# Patient Record
Sex: FEMALE | Race: WHITE | NOT HISPANIC OR LATINO | Employment: OTHER | URBAN - METROPOLITAN AREA
[De-identification: names, ages, dates, MRNs, and addresses within clinical notes are randomized per-mention and may not be internally consistent; named-entity substitution may affect disease eponyms.]

---

## 2017-08-11 ENCOUNTER — OFFICE VISIT (OUTPATIENT)
Dept: OBSTETRICS AND GYNECOLOGY | Facility: CLINIC | Age: 67
End: 2017-08-11
Payer: MEDICARE

## 2017-08-11 ENCOUNTER — PATIENT MESSAGE (OUTPATIENT)
Dept: OBSTETRICS AND GYNECOLOGY | Facility: CLINIC | Age: 67
End: 2017-08-11

## 2017-08-11 VITALS — WEIGHT: 189.13 LBS | BODY MASS INDEX: 36.94 KG/M2

## 2017-08-11 DIAGNOSIS — I10 ESSENTIAL HYPERTENSION: ICD-10-CM

## 2017-08-11 DIAGNOSIS — N39.41 URGE INCONTINENCE OF URINE: Primary | ICD-10-CM

## 2017-08-11 PROCEDURE — 1159F MED LIST DOCD IN RCRD: CPT | Mod: S$GLB,,, | Performed by: OBSTETRICS & GYNECOLOGY

## 2017-08-11 PROCEDURE — 99213 OFFICE O/P EST LOW 20 MIN: CPT | Mod: S$GLB,,, | Performed by: OBSTETRICS & GYNECOLOGY

## 2017-08-11 PROCEDURE — 1126F AMNT PAIN NOTED NONE PRSNT: CPT | Mod: S$GLB,,, | Performed by: OBSTETRICS & GYNECOLOGY

## 2017-08-11 PROCEDURE — 3008F BODY MASS INDEX DOCD: CPT | Mod: S$GLB,,, | Performed by: OBSTETRICS & GYNECOLOGY

## 2017-08-11 PROCEDURE — 99999 PR PBB SHADOW E&M-EST. PATIENT-LVL II: CPT | Mod: PBBFAC,,, | Performed by: OBSTETRICS & GYNECOLOGY

## 2017-08-11 RX ORDER — OXYBUTYNIN CHLORIDE 15 MG/1
15 TABLET, EXTENDED RELEASE ORAL DAILY
Qty: 90 TABLET | Refills: 3 | Status: SHIPPED | OUTPATIENT
Start: 2017-08-11 | End: 2018-03-09

## 2017-08-11 RX ORDER — DARIFENACIN 15 MG/1
15 TABLET, EXTENDED RELEASE ORAL DAILY
Qty: 30 TABLET | Refills: 2 | Status: SHIPPED | OUTPATIENT
Start: 2017-08-11 | End: 2017-08-21

## 2017-08-11 NOTE — PROGRESS NOTES
Chief Complaint   Patient presents with    Urinary Incontinence     experiencing stress incontinence, also recently experiencing incontinence while waiting in line for the restroom. Tried myrbetriq prescribed at last visit it did not help.       History of Present Illness: Julee Ballesteros is a 67 y.o. female that presents today 8/11/2017 for   Chief Complaint   Patient presents with    Urinary Incontinence     experiencing stress incontinence, also recently experiencing incontinence while waiting in line for the restroom. Tried myrbetriq prescribed at last visit it did not help.     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 She reports that she lost large amounts of urine trying to get to the bladder. She says that she can't stop emptying. She reports that is happening over and over.     Past Medical History:   Diagnosis Date    Hyperlipidemia     Hypertension        Past Surgical History:   Procedure Laterality Date    BREAST BIOPSY      CHOLECYSTECTOMY  2014    COLONOSCOPY  2011    repeat in 10    HYSTERECTOMY  1978    for prolapse    TONSILLECTOMY      UMBILICAL HERNIA REPAIR         Current Outpatient Prescriptions   Medication Sig Dispense Refill    acyclovir (ZOVIRAX) 800 MG Tab       b complex vitamins tablet Take 1 tablet by mouth once daily.      co-enzyme Q-10 30 mg capsule       doxycycline (PERIOSTAT) 20 MG tablet       lisinopril-hydrochlorothiazide (PRINZIDE,ZESTORETIC) 20-25 mg Tab Take 1 tablet by mouth once daily. 90 tablet 3    loratadine (CLARITIN) 10 mg tablet       pantoprazole (PROTONIX) 40 MG tablet Take 1 tablet (40 mg total) by mouth once daily. 90 tablet 3    ropinirole (REQUIP) 0.5 MG tablet Take 1 tablet (0.5 mg total) by mouth every evening. 90 tablet 3    simvastatin (ZOCOR) 40 MG tablet Take 1 tablet (40 mg total) by mouth every evening. 90 tablet 3    VENTOLIN HFA 90 mcg/actuation inhaler Inhale 2 puffs into the lungs every 4 (four) hours as needed.      benzonatate  (TESSALON) 200 MG capsule Take 1 capsule (200 mg total) by mouth 3 (three) times daily as needed. 90 capsule 3    darifenacin (ENABLEX) 15 mg 24 hr tablet Take 1 tablet (15 mg total) by mouth once daily. 30 tablet 2     No current facility-administered medications for this visit.        Review of patient's allergies indicates:   Allergen Reactions    Darvon [propoxyphene] Nausea Only    Feldene [piroxicam] Hives    Tramadol Nausea Only       Family History   Problem Relation Age of Onset    Cancer Paternal Grandfather     Diabetes Maternal Grandmother     Heart disease Maternal Grandmother     Diabetes Mother     Heart disease Mother     Diabetes Brother     Heart disease Brother        Social History   Substance Use Topics    Smoking status: Never Smoker    Smokeless tobacco: Never Used    Alcohol use 0.0 oz/week      Comment: Social        OB History    Para Term  AB Living   3 3 3     3   SAB TAB Ectopic Multiple Live Births                  # Outcome Date GA Lbr Ruy/2nd Weight Sex Delivery Anes PTL Lv   3 Term      Vag-Spont      2 Term      Vag-Spont      1 Term      Vag-Spont             Review of Symptoms:  GENERAL: Denies weight gain or weight loss. Feeling well overall.   SKIN: Denies rash or lesions.   HEAD: Denies head injury or headache.   NODES: Denies enlarged lymph nodes.   CHEST: Denies chest pain or shortness of breath.   CARDIOVASCULAR: Denies palpitations or left sided chest pain.   ABDOMEN: No abdominal pain, constipation, diarrhea, nausea, vomiting or rectal bleeding.   URINARY: No frequency, dysuria, hematuria, or burning on urination.  HEMATOLOGIC: No easy bruisability or excessive bleeding.   MUSCULOSKELETAL: Denies joint pain or swelling.     Wt 85.8 kg (189 lb 2.5 oz)   Physical Exam:  APPEARANCE: Well nourished, well developed, in no acute distress.  SKIN: Normal skin turgor, no lesions.  NECK: Neck symmetric without masses   RESPIRATORY: Normal respiratory  effort with no retractions or use of accessory muscles  CARDIOVASCULAR: Peripheral vascular system with no swelling no varicosities and palpation of pulses normal  LYMPHATIC: No enlargements of the lymph nodes noted in the neck, axillae, or groin  ABDOMEN: Soft. No tenderness or masses. No hepatosplenomegaly. No hernias.  PELVIC: Normal external female genitalia without lesions. Normal hair distribution. Adequate perineal body, normal urethral meatus. Urethra with no masses.  Bladder nontender. Vagina moist and well rugated without lesions or discharge. Grade 1-2 cystocele Urethra and bladder normal.  EXTREMITIES: No clubbing cyanosis or edema.    ASSESSMENT/PLAN:  Urge incontinence of urine  -     darifenacin (ENABLEX) 15 mg 24 hr tablet; Take 1 tablet (15 mg total) by mouth once daily.  Dispense: 30 tablet; Refill: 2    Essential hypertension      15 minutes spent today with this patient. Greater than half spent in counseling today.

## 2017-12-12 ENCOUNTER — OFFICE VISIT (OUTPATIENT)
Dept: OBSTETRICS AND GYNECOLOGY | Facility: CLINIC | Age: 67
End: 2017-12-12
Payer: MEDICARE

## 2017-12-12 VITALS
HEIGHT: 60 IN | DIASTOLIC BLOOD PRESSURE: 80 MMHG | SYSTOLIC BLOOD PRESSURE: 146 MMHG | BODY MASS INDEX: 35.96 KG/M2 | WEIGHT: 183.19 LBS

## 2017-12-12 DIAGNOSIS — Z12.31 VISIT FOR SCREENING MAMMOGRAM: ICD-10-CM

## 2017-12-12 DIAGNOSIS — R10.9 RIGHT LATERAL ABDOMINAL PAIN: ICD-10-CM

## 2017-12-12 DIAGNOSIS — Z01.411 ENCOUNTER FOR GYNECOLOGICAL EXAMINATION WITH ABNORMAL FINDING: Primary | ICD-10-CM

## 2017-12-12 DIAGNOSIS — N39.41 URGE INCONTINENCE OF URINE: ICD-10-CM

## 2017-12-12 PROCEDURE — 99999 PR PBB SHADOW E&M-EST. PATIENT-LVL III: CPT | Mod: PBBFAC,,, | Performed by: OBSTETRICS & GYNECOLOGY

## 2017-12-12 PROCEDURE — G0101 CA SCREEN;PELVIC/BREAST EXAM: HCPCS | Mod: S$GLB,,, | Performed by: OBSTETRICS & GYNECOLOGY

## 2017-12-12 NOTE — PROGRESS NOTES
Chief Complaint   Patient presents with    Well Woman     History of Present Illness: Julee Ballesteros is a 67 y.o. female that presents today 12/12/2017 for well gyn visit. She reports that her right ovary hurts and wakes her up at night. She says that it is intermittent. She says when she pushes there it hurts. She says that she has had a cyst there before.    Past Medical History:   Diagnosis Date    Hyperlipidemia     Hypertension        Past Surgical History:   Procedure Laterality Date    BREAST BIOPSY      CHOLECYSTECTOMY  2014    COLONOSCOPY  2011    repeat in 10    HYSTERECTOMY  1978    for prolapse    TONSILLECTOMY      UMBILICAL HERNIA REPAIR         Current Outpatient Prescriptions   Medication Sig Dispense Refill    acyclovir (ZOVIRAX) 800 MG Tab       b complex vitamins tablet Take 1 tablet by mouth once daily.      co-enzyme Q-10 30 mg capsule       doxycycline (PERIOSTAT) 20 MG tablet       gabapentin (NEURONTIN) 100 MG capsule 1 capsule qam and 2 capsules at hs 270 capsule 1    lisinopril-hydrochlorothiazide (PRINZIDE,ZESTORETIC) 20-25 mg Tab Take 1 tablet by mouth once daily. 90 tablet 1    loratadine (CLARITIN) 10 mg tablet       pantoprazole (PROTONIX) 40 MG tablet TAKE 1 TABLET ONE TIME DAILY 90 tablet 3    simvastatin (ZOCOR) 40 MG tablet TAKE 1 TABLET EVERY EVENING 90 tablet 3    oxybutynin (DITROPAN XL) 15 MG TR24 Take 1 tablet (15 mg total) by mouth once daily. 90 tablet 3    VENTOLIN HFA 90 mcg/actuation inhaler Inhale 2 puffs into the lungs every 4 (four) hours as needed.       No current facility-administered medications for this visit.        Review of patient's allergies indicates:   Allergen Reactions    Darvon [propoxyphene] Nausea Only    Feldene [piroxicam] Hives    Tramadol Nausea Only       Family History   Problem Relation Age of Onset    Cancer Paternal Grandfather     Diabetes Maternal Grandmother     Heart disease Maternal Grandmother     Diabetes  Mother     Heart disease Mother     Diabetes Brother     Heart disease Brother     Breast cancer Cousin     Ovarian cancer Neg Hx        Social History     Social History    Marital status:      Spouse name: N/A    Number of children: N/A    Years of education: N/A     Social History Main Topics    Smoking status: Never Smoker    Smokeless tobacco: Never Used    Alcohol use 0.0 oz/week      Comment: Social     Drug use: No    Sexual activity: Yes     Partners: Male     Birth control/ protection: See Surgical Hx     Other Topics Concern    None     Social History Narrative    None       OB History    Para Term  AB Living   3 3 3     3   SAB TAB Ectopic Multiple Live Births                  # Outcome Date GA Lbr Ruy/2nd Weight Sex Delivery Anes PTL Lv   3 Term      Vag-Spont      2 Term      Vag-Spont      1 Term      Vag-Spont             Review of Symptoms:  GENERAL: Denies weight gain or weight loss. Feeling well overall.   SKIN: Denies rash or lesions.   HEAD: Denies head injury or headache.   NODES: Denies enlarged lymph nodes.   CHEST: Denies chest pain or shortness of breath.   CARDIOVASCULAR: Denies palpitations or left sided chest pain.   ABDOMEN: + abdominal pain for years no constipation, diarrhea, nausea, vomiting or rectal bleeding.   URINARY: No frequency, dysuria, hematuria, or burning on urination.  HEMATOLOGIC: No easy bruisability or excessive bleeding.   MUSCULOSKELETAL: Denies joint pain or swelling.     BP (!) 146/80   Ht 5' (1.524 m)   Wt 83.1 kg (183 lb 3.2 oz)   Physical Exam:  APPEARANCE: Well nourished, well developed, in no acute distress.  SKIN: Normal skin turgor, no lesions.  NECK: Neck symmetric without masses   RESPIRATORY: Normal respiratory effort with no retractions or use of accessory muscles  CARDIOVASCULAR: Peripheral vascular system with no swelling no varicosities and palpation of pulses normal  LYMPHATIC: No enlargements of the lymph nodes  noted in the neck, axillae, or groin  ABDOMEN: Soft.  Right superficial abdominal wall tenderness no masses. No hepatosplenomegaly. No hernias.  BREASTS: Symmetrical, no skin changes or visible lesions. No palpable masses, nipple discharge or adenopathy bilaterally.  PELVIC: Normal external female genitalia without lesions. Normal hair distribution. Adequate perineal body, normal urethral meatus. Urethra with no masses.  Bladder nontender. Vagina moist and well rugated without lesions or discharge. No significant cystocele or rectocele.  Adnexa without masses or tenderness. Urethra and bladder normal.   EXTREMITIES: No clubbing cyanosis or edema.    ASSESSMENT/PLAN:  Encounter for gynecological examination with abnormal finding    Visit for screening mammogram  -     Mammo Digital Screening Bilat With CAD; Future; Expected date: 12/12/2017    Urge incontinence of urine    Right lateral abdominal pain  -     US Pelvis Comp with Transvag NON-OB (xpd; Future; Expected date: 12/12/2017          Patient was counseled today on Pap guidelines, recommendation for yearly exams, mammograms every other year after the age of 40 and annually after the age of 50, Colonoscopy after the age of 50, Dexa Bone Scan and calcium and vitamin D supplementation in menopause and to see her PCP for other health maintenance.   FOLLOW-UP:prn

## 2017-12-13 ENCOUNTER — TELEPHONE (OUTPATIENT)
Dept: OBSTETRICS AND GYNECOLOGY | Facility: CLINIC | Age: 67
End: 2017-12-13

## 2017-12-13 NOTE — TELEPHONE ENCOUNTER
----- Message from Marely Lee sent at 12/13/2017  9:42 AM CST -----  Contact: Julee Kunz is asking for MAMMO and Ultrasound scheduled today to be moved to Monday or Wednesday around 9:30. Please call [phone]. Thanks!

## 2017-12-18 ENCOUNTER — HOSPITAL ENCOUNTER (OUTPATIENT)
Dept: RADIOLOGY | Facility: HOSPITAL | Age: 67
Discharge: HOME | End: 2017-12-18
Attending: OBSTETRICS & GYNECOLOGY
Payer: MEDICARE

## 2017-12-18 DIAGNOSIS — R10.9 RIGHT LATERAL ABDOMINAL PAIN: ICD-10-CM

## 2017-12-18 DIAGNOSIS — Z12.31 VISIT FOR SCREENING MAMMOGRAM: ICD-10-CM

## 2017-12-18 PROCEDURE — 76856 US EXAM PELVIC COMPLETE: CPT | Mod: 26,,, | Performed by: RADIOLOGY

## 2017-12-18 PROCEDURE — 77063 BREAST TOMOSYNTHESIS BI: CPT | Mod: 26,,, | Performed by: RADIOLOGY

## 2017-12-18 PROCEDURE — 76856 US EXAM PELVIC COMPLETE: CPT | Mod: TC,PN

## 2017-12-18 PROCEDURE — 76830 TRANSVAGINAL US NON-OB: CPT | Mod: 26,,, | Performed by: RADIOLOGY

## 2017-12-18 PROCEDURE — 77067 SCR MAMMO BI INCL CAD: CPT | Mod: 26,,, | Performed by: RADIOLOGY

## 2017-12-18 PROCEDURE — 77067 SCR MAMMO BI INCL CAD: CPT | Mod: TC,PN

## 2018-04-18 PROBLEM — M46.1 SACROILIITIS: Status: ACTIVE | Noted: 2018-04-18

## 2018-04-18 PROBLEM — M70.61 GREATER TROCHANTERIC BURSITIS, RIGHT: Status: ACTIVE | Noted: 2018-04-18

## 2018-10-15 ENCOUNTER — PATIENT MESSAGE (OUTPATIENT)
Dept: OBSTETRICS AND GYNECOLOGY | Facility: CLINIC | Age: 68
End: 2018-10-15

## 2018-10-15 DIAGNOSIS — Z12.39 SCREENING FOR BREAST CANCER: Primary | ICD-10-CM

## 2019-01-07 ENCOUNTER — HOSPITAL ENCOUNTER (OUTPATIENT)
Dept: RADIOLOGY | Facility: HOSPITAL | Age: 69
Discharge: HOME | End: 2019-01-07
Attending: OBSTETRICS & GYNECOLOGY
Payer: MEDICARE

## 2019-01-07 VITALS — WEIGHT: 191 LBS | HEIGHT: 60 IN | BODY MASS INDEX: 37.5 KG/M2

## 2019-01-07 DIAGNOSIS — Z12.39 SCREENING FOR BREAST CANCER: ICD-10-CM

## 2019-01-07 PROCEDURE — 77067 SCR MAMMO BI INCL CAD: CPT | Mod: 26,,, | Performed by: RADIOLOGY

## 2019-01-07 PROCEDURE — 77063 BREAST TOMOSYNTHESIS BI: CPT | Mod: 26,,, | Performed by: RADIOLOGY

## 2019-01-07 PROCEDURE — 77063 MAMMO DIGITAL SCREENING BILAT WITH TOMOSYNTHESIS_CAD: ICD-10-PCS | Mod: 26,,, | Performed by: RADIOLOGY

## 2019-01-07 PROCEDURE — 77067 MAMMO DIGITAL SCREENING BILAT WITH TOMOSYNTHESIS_CAD: ICD-10-PCS | Mod: 26,,, | Performed by: RADIOLOGY

## 2019-01-07 PROCEDURE — 77067 SCR MAMMO BI INCL CAD: CPT | Mod: TC,PN

## 2019-01-08 ENCOUNTER — TELEPHONE (OUTPATIENT)
Dept: RADIOLOGY | Facility: HOSPITAL | Age: 69
End: 2019-01-08

## 2019-01-08 NOTE — TELEPHONE ENCOUNTER
----- Message from Shankar Lizama sent at 1/8/2019  2:50 PM CST -----  Type:  Patient Returning Call    Who Called: Patient  Who Left Message for Patient:  NA  Does the patient know what this is regarding?:  GREG  Best Call Back Number: [phone], (cell) [phone]    Additional Information:

## 2019-01-09 ENCOUNTER — PATIENT MESSAGE (OUTPATIENT)
Dept: OBSTETRICS AND GYNECOLOGY | Facility: CLINIC | Age: 69
End: 2019-01-09

## 2019-01-10 ENCOUNTER — PATIENT MESSAGE (OUTPATIENT)
Dept: OBSTETRICS AND GYNECOLOGY | Facility: CLINIC | Age: 69
End: 2019-01-10

## 2019-01-10 NOTE — TELEPHONE ENCOUNTER
Pt would like to be fit in for dx mammogram before she goes out of town on 1/27, next available is 1/30 please advise

## 2019-01-16 ENCOUNTER — HOSPITAL ENCOUNTER (OUTPATIENT)
Dept: RADIOLOGY | Facility: HOSPITAL | Age: 69
Discharge: HOME | End: 2019-01-16
Attending: OBSTETRICS & GYNECOLOGY
Payer: MEDICARE

## 2019-01-16 DIAGNOSIS — R92.8 ABNORMAL MAMMOGRAM OF LEFT BREAST: ICD-10-CM

## 2019-01-16 PROCEDURE — 77061 BREAST TOMOSYNTHESIS UNI: CPT | Mod: 26,LT,, | Performed by: RADIOLOGY

## 2019-01-16 PROCEDURE — 76642 US BREAST LEFT LIMITED: ICD-10-PCS | Mod: 26,LT,, | Performed by: RADIOLOGY

## 2019-01-16 PROCEDURE — 76642 ULTRASOUND BREAST LIMITED: CPT | Mod: TC,PO,LT

## 2019-01-16 PROCEDURE — 77061 MAMMO DIGITAL DIAGNOSTIC LEFT WITH TOMOSYNTHESIS_CAD: ICD-10-PCS | Mod: 26,LT,, | Performed by: RADIOLOGY

## 2019-01-16 PROCEDURE — 77065 DX MAMMO INCL CAD UNI: CPT | Mod: 26,LT,, | Performed by: RADIOLOGY

## 2019-01-16 PROCEDURE — 76642 ULTRASOUND BREAST LIMITED: CPT | Mod: 26,LT,, | Performed by: RADIOLOGY

## 2019-01-16 PROCEDURE — 77061 BREAST TOMOSYNTHESIS UNI: CPT | Mod: TC,PO,LT

## 2019-01-16 PROCEDURE — 77065 MAMMO DIGITAL DIAGNOSTIC LEFT WITH TOMOSYNTHESIS_CAD: ICD-10-PCS | Mod: 26,LT,, | Performed by: RADIOLOGY

## 2019-01-16 PROCEDURE — 77065 DX MAMMO INCL CAD UNI: CPT | Mod: TC,PO,LT

## 2019-01-25 ENCOUNTER — PATIENT MESSAGE (OUTPATIENT)
Dept: OBSTETRICS AND GYNECOLOGY | Facility: CLINIC | Age: 69
End: 2019-01-25

## 2019-02-03 ENCOUNTER — PATIENT MESSAGE (OUTPATIENT)
Dept: OBSTETRICS AND GYNECOLOGY | Facility: CLINIC | Age: 69
End: 2019-02-03

## 2019-02-11 ENCOUNTER — PATIENT MESSAGE (OUTPATIENT)
Dept: OBSTETRICS AND GYNECOLOGY | Facility: CLINIC | Age: 69
End: 2019-02-11

## 2019-03-28 ENCOUNTER — OFFICE VISIT (OUTPATIENT)
Dept: OBSTETRICS AND GYNECOLOGY | Facility: CLINIC | Age: 69
End: 2019-03-28
Payer: MEDICARE

## 2019-03-28 VITALS
WEIGHT: 190.06 LBS | SYSTOLIC BLOOD PRESSURE: 100 MMHG | HEIGHT: 60 IN | BODY MASS INDEX: 37.31 KG/M2 | DIASTOLIC BLOOD PRESSURE: 60 MMHG

## 2019-03-28 DIAGNOSIS — N64.4 BREAST PAIN, RIGHT: Primary | ICD-10-CM

## 2019-03-28 PROCEDURE — 3074F PR MOST RECENT SYSTOLIC BLOOD PRESSURE < 130 MM HG: ICD-10-PCS | Mod: CPTII,S$GLB,, | Performed by: OBSTETRICS & GYNECOLOGY

## 2019-03-28 PROCEDURE — 1101F PR PT FALLS ASSESS DOC 0-1 FALLS W/OUT INJ PAST YR: ICD-10-PCS | Mod: CPTII,S$GLB,, | Performed by: OBSTETRICS & GYNECOLOGY

## 2019-03-28 PROCEDURE — 1101F PT FALLS ASSESS-DOCD LE1/YR: CPT | Mod: CPTII,S$GLB,, | Performed by: OBSTETRICS & GYNECOLOGY

## 2019-03-28 PROCEDURE — 99213 OFFICE O/P EST LOW 20 MIN: CPT | Mod: S$GLB,,, | Performed by: OBSTETRICS & GYNECOLOGY

## 2019-03-28 PROCEDURE — 99213 PR OFFICE/OUTPT VISIT, EST, LEVL III, 20-29 MIN: ICD-10-PCS | Mod: S$GLB,,, | Performed by: OBSTETRICS & GYNECOLOGY

## 2019-03-28 PROCEDURE — 99999 PR PBB SHADOW E&M-EST. PATIENT-LVL III: ICD-10-PCS | Mod: PBBFAC,,, | Performed by: OBSTETRICS & GYNECOLOGY

## 2019-03-28 PROCEDURE — 3078F DIAST BP <80 MM HG: CPT | Mod: CPTII,S$GLB,, | Performed by: OBSTETRICS & GYNECOLOGY

## 2019-03-28 PROCEDURE — 99999 PR PBB SHADOW E&M-EST. PATIENT-LVL III: CPT | Mod: PBBFAC,,, | Performed by: OBSTETRICS & GYNECOLOGY

## 2019-03-28 PROCEDURE — 3078F PR MOST RECENT DIASTOLIC BLOOD PRESSURE < 80 MM HG: ICD-10-PCS | Mod: CPTII,S$GLB,, | Performed by: OBSTETRICS & GYNECOLOGY

## 2019-03-28 PROCEDURE — 3074F SYST BP LT 130 MM HG: CPT | Mod: CPTII,S$GLB,, | Performed by: OBSTETRICS & GYNECOLOGY

## 2019-03-28 RX ORDER — SULFAMETHOXAZOLE AND TRIMETHOPRIM 800; 160 MG/1; MG/1
1 TABLET ORAL 2 TIMES DAILY
Qty: 20 TABLET | Refills: 0 | Status: SHIPPED | OUTPATIENT
Start: 2019-03-28 | End: 2019-04-07

## 2019-03-28 NOTE — PROGRESS NOTES
Chief Complaint   Patient presents with    right breast soreness and burning sensation       History of Present Illness: Julee Ballesteros is a 68 y.o. female that presents today 3/28/2019 for   Chief Complaint   Patient presents with    right breast soreness and burning sensation     She reports that for several months she has had right breast pain in the evening after bra removed. She reports that she starts having more pain after removed she feels more swelling fire like pain and throbbing like pain. She has trouble sleeping due to pain.     Past Medical History:   Diagnosis Date    GERD (gastroesophageal reflux disease)     H/O bronchitis     H/O: pneumonia     Hearing aid worn     Hyperlipidemia     Hypertension        Past Surgical History:   Procedure Laterality Date    BLOCK-JOINT-SACROILIAC Bilateral 4/18/2018    [facility]    BREAST BIOPSY Right     neg    BREAST CYST EXCISION Left 1990/ 2005    x2- neg    CHOLECYSTECTOMY  2014    COLONOSCOPY  2011    repeat in 10    FINGER SURGERY      HEEL SPUR EXCISION      HYSTERECTOMY  1978    for prolapse    INJECTION-JOINT/ Greater Trochanteric/ Hip Right 4/18/2018    [facility]    TONSILLECTOMY      UMBILICAL HERNIA REPAIR         Current Outpatient Medications   Medication Sig Dispense Refill    acyclovir (ZOVIRAX) 800 MG Tab Take 800 mg by mouth once daily.       b complex vitamins tablet Take 1 tablet by mouth once daily.      cetirizine (ZYRTEC) 10 MG tablet Take 10 mg by mouth once daily.      cholecalciferol, vitamin D3, (VITAMIN D3) 5,000 unit Tab Take 5,000 Units by mouth once daily.      co-enzyme Q-10 30 mg capsule       doxycycline (PERIOSTAT) 20 MG tablet Take 20 mg by mouth once daily.       gabapentin (NEURONTIN) 100 MG capsule TAKE 1 CAPSULE EVERY MORNING  AND TAKE 2 CAPSULES AT BEDTIME 270 capsule 3    lisinopril-hydrochlorothiazide (PRINZIDE,ZESTORETIC)  20-25 mg Tab Take 1 tablet by mouth once daily. 90 tablet 3    pantoprazole (PROTONIX) 40 MG tablet Take 1 tablet (40 mg total) by mouth once daily. 90 tablet 3    simvastatin (ZOCOR) 40 MG tablet Take 1 tablet (40 mg total) by mouth every evening. 90 tablet 3    sulfamethoxazole-trimethoprim 800-160mg (BACTRIM DS) 800-160 mg Tab Take 1 tablet by mouth 2 (two) times daily. for 10 days 20 tablet 0     No current facility-administered medications for this visit.        Review of patient's allergies indicates:   Allergen Reactions    Darvon [propoxyphene] Nausea Only    Feldene [piroxicam] Hives    Tramadol Nausea Only       Family History   Problem Relation Age of Onset    Cancer Paternal Grandfather     Diabetes Maternal Grandmother     Heart disease Maternal Grandmother     Diabetes Mother     Heart disease Mother     Diabetes Brother     Heart disease Brother     Breast cancer Cousin 60    Breast cancer Maternal Cousin 50    Breast cancer Paternal Cousin 50    Breast cancer Paternal Cousin 50    Ovarian cancer Neg Hx        Social History     Tobacco Use    Smoking status: Never Smoker    Smokeless tobacco: Never Used   Substance Use Topics    Alcohol use: Yes     Alcohol/week: 0.0 oz     Comment: Social     Drug use: No       OB History    Para Term  AB Living   3 3 3     3   SAB TAB Ectopic Multiple Live Births                  # Outcome Date GA Lbr Ruy/2nd Weight Sex Delivery Anes PTL Lv   3 Term      Vag-Spont      2 Term      Vag-Spont      1 Term      Vag-Spont          Review of Symptoms:  GENERAL: Denies weight gain or weight loss. Feeling well overall.   SKIN: Denies rash or lesions.   HEAD: Denies head injury or headache.   NODES: Denies enlarged lymph nodes.   CHEST: Denies chest pain or shortness of breath.   CARDIOVASCULAR: Denies palpitations or left sided chest pain.   ABDOMEN: No abdominal pain, constipation, diarrhea, nausea, vomiting or rectal bleeding.    URINARY: No frequency, dysuria, hematuria, or burning on urination.  HEMATOLOGIC: No easy bruisability or excessive bleeding.   MUSCULOSKELETAL: Denies joint pain or swelling.     /60   Ht 5' (1.524 m)   Wt 86.2 kg (190 lb 0.6 oz)   Physical Exam:  APPEARANCE: Well nourished, well developed, in no acute distress.  SKIN: Normal skin turgor, no lesions.  NECK: Neck symmetric without masses   RESPIRATORY: Normal respiratory effort with no retractions or use of accessory muscles  CARDIOVASCULAR: Peripheral vascular system with no swelling no varicosities and palpation of pulses normal  LYMPHATIC: No enlargements of the lymph nodes noted in the neck, axillae, or groin  ABDOMEN: Soft. No tenderness or masses. No hepatosplenomegaly. No hernias.  BREASTS: Symmetrical, no skin changes or visible lesions. No palpable masses, nipple discharge or adenopathy bilaterally.EXTREMITIES: No clubbing cyanosis or edema.    ASSESSMENT/PLAN:  Breast pain, right  -     sulfamethoxazole-trimethoprim 800-160mg (BACTRIM DS) 800-160 mg Tab; Take 1 tablet by mouth 2 (two) times daily. for 10 days  Dispense: 20 tablet; Refill: 0  -     Mammo Digital Diagnostic Right With CAD; Future; Expected date: 03/28/2019      15 minutes spent today with this patient. Greater than half spent in counseling today.

## 2019-04-05 ENCOUNTER — PATIENT MESSAGE (OUTPATIENT)
Dept: OBSTETRICS AND GYNECOLOGY | Facility: CLINIC | Age: 69
End: 2019-04-05

## 2019-04-05 RX ORDER — CEPHALEXIN 500 MG/1
500 CAPSULE ORAL 4 TIMES DAILY
Qty: 40 CAPSULE | Refills: 0 | Status: SHIPPED | OUTPATIENT
Start: 2019-04-05 | End: 2019-04-15

## 2019-04-08 ENCOUNTER — HOSPITAL ENCOUNTER (OUTPATIENT)
Dept: RADIOLOGY | Facility: HOSPITAL | Age: 69
Discharge: HOME | End: 2019-04-08
Attending: OBSTETRICS & GYNECOLOGY
Payer: MEDICARE

## 2019-04-08 DIAGNOSIS — N64.4 BREAST PAIN, RIGHT: ICD-10-CM

## 2019-04-08 PROCEDURE — 76642 ULTRASOUND BREAST LIMITED: CPT | Mod: 26,RT,, | Performed by: RADIOLOGY

## 2019-04-08 PROCEDURE — 76642 ULTRASOUND BREAST LIMITED: CPT | Mod: TC,PO,RT

## 2019-04-08 PROCEDURE — 76642 US BREAST RIGHT LIMITED: ICD-10-PCS | Mod: 26,RT,, | Performed by: RADIOLOGY

## 2019-04-18 ENCOUNTER — PATIENT MESSAGE (OUTPATIENT)
Dept: OBSTETRICS AND GYNECOLOGY | Facility: CLINIC | Age: 69
End: 2019-04-18

## 2019-10-29 ENCOUNTER — OFFICE VISIT (OUTPATIENT)
Dept: GASTROENTEROLOGY | Facility: CLINIC | Age: 69
End: 2019-10-29
Payer: MEDICARE

## 2019-10-29 VITALS
DIASTOLIC BLOOD PRESSURE: 62 MMHG | RESPIRATION RATE: 18 BRPM | HEART RATE: 87 BPM | WEIGHT: 192 LBS | SYSTOLIC BLOOD PRESSURE: 139 MMHG | HEIGHT: 60 IN | BODY MASS INDEX: 37.69 KG/M2

## 2019-10-29 DIAGNOSIS — Z90.49 S/P CHOLECYSTECTOMY: ICD-10-CM

## 2019-10-29 DIAGNOSIS — Z86.010 HISTORY OF COLON POLYPS: ICD-10-CM

## 2019-10-29 DIAGNOSIS — R10.9 ABDOMINAL CRAMPING: ICD-10-CM

## 2019-10-29 DIAGNOSIS — K21.9 GASTROESOPHAGEAL REFLUX DISEASE WITHOUT ESOPHAGITIS: ICD-10-CM

## 2019-10-29 DIAGNOSIS — R19.7 DIARRHEA, UNSPECIFIED TYPE: Primary | ICD-10-CM

## 2019-10-29 DIAGNOSIS — R19.8 IRREGULAR BOWEL HABITS: ICD-10-CM

## 2019-10-29 DIAGNOSIS — Z87.19 HISTORY OF CONSTIPATION: ICD-10-CM

## 2019-10-29 PROCEDURE — 99999 PR PBB SHADOW E&M-EST. PATIENT-LVL IV: ICD-10-PCS | Mod: PBBFAC,,, | Performed by: NURSE PRACTITIONER

## 2019-10-29 PROCEDURE — 1101F PT FALLS ASSESS-DOCD LE1/YR: CPT | Mod: CPTII,S$GLB,, | Performed by: NURSE PRACTITIONER

## 2019-10-29 PROCEDURE — 3075F PR MOST RECENT SYSTOLIC BLOOD PRESS GE 130-139MM HG: ICD-10-PCS | Mod: CPTII,S$GLB,, | Performed by: NURSE PRACTITIONER

## 2019-10-29 PROCEDURE — 99204 PR OFFICE/OUTPT VISIT, NEW, LEVL IV, 45-59 MIN: ICD-10-PCS | Mod: S$GLB,,, | Performed by: NURSE PRACTITIONER

## 2019-10-29 PROCEDURE — 1101F PR PT FALLS ASSESS DOC 0-1 FALLS W/OUT INJ PAST YR: ICD-10-PCS | Mod: CPTII,S$GLB,, | Performed by: NURSE PRACTITIONER

## 2019-10-29 PROCEDURE — 3078F PR MOST RECENT DIASTOLIC BLOOD PRESSURE < 80 MM HG: ICD-10-PCS | Mod: CPTII,S$GLB,, | Performed by: NURSE PRACTITIONER

## 2019-10-29 PROCEDURE — 3075F SYST BP GE 130 - 139MM HG: CPT | Mod: CPTII,S$GLB,, | Performed by: NURSE PRACTITIONER

## 2019-10-29 PROCEDURE — 99999 PR PBB SHADOW E&M-EST. PATIENT-LVL IV: CPT | Mod: PBBFAC,,, | Performed by: NURSE PRACTITIONER

## 2019-10-29 PROCEDURE — 3078F DIAST BP <80 MM HG: CPT | Mod: CPTII,S$GLB,, | Performed by: NURSE PRACTITIONER

## 2019-10-29 PROCEDURE — 99204 OFFICE O/P NEW MOD 45 MIN: CPT | Mod: S$GLB,,, | Performed by: NURSE PRACTITIONER

## 2019-10-29 RX ORDER — DICYCLOMINE HYDROCHLORIDE 10 MG/1
10 CAPSULE ORAL
Qty: 120 CAPSULE | Refills: 0 | Status: SHIPPED | OUTPATIENT
Start: 2019-10-29 | End: 2019-11-27 | Stop reason: SDUPTHER

## 2019-10-29 NOTE — PROGRESS NOTES
Subjective:       Patient ID: Julee Ballesteros is a 69 y.o. female, Body mass index is 37.5 kg/m².    Chief Complaint: Abdominal Cramping; Diarrhea; and Nausea      Patient is new to me. Former patient of Dr. He.    Diarrhea    This is a recurrent problem. The current episode started more than 1 month ago (Started 7/2019; intermittent). The problem occurs 2 to 4 times per day. The problem has been gradually worsening (worse over this past weekend). The stool consistency is described as watery and mucous (denies blood). The patient states that diarrhea awakens her from sleep. Associated symptoms include abdominal pain (abdominal cramping prior to having diarrhea; denies currently) and bloating. Pertinent negatives include no chills, coughing, fever, increased  flatus, vomiting or weight loss. Exacerbated by: fried food and greasy food. There are no known risk factors (denies recent antibiotics, hospitalization, or foreign travel). She has tried nothing for the symptoms. Her past medical history is significant for irritable bowel syndrome (dx with IBS by Dr. He in 2010). There is no history of inflammatory bowel disease or a recent abdominal surgery.     Review of Systems   Constitutional: Negative for appetite change, chills, fever, unexpected weight change and weight loss.   HENT: Negative for trouble swallowing.    Respiratory: Negative for cough and shortness of breath.    Cardiovascular: Negative for chest pain.   Gastrointestinal: Positive for abdominal pain (abdominal cramping prior to having diarrhea; denies currently), bloating, constipation (hx of constipation; has gone three days without a bowel movement in the past; denies any recent constipation), diarrhea and nausea (nausea prior to diarrhea; denies currently). Negative for blood in stool, flatus and vomiting.   Genitourinary: Negative for difficulty urinating and dysuria.   Musculoskeletal: Negative for gait problem.   Skin: Negative for rash.    Neurological: Negative for speech difficulty.   Psychiatric/Behavioral: Negative for confusion.       Past Medical History:   Diagnosis Date    Colon polyp     GERD (gastroesophageal reflux disease)     H/O bronchitis     H/O: pneumonia     Hearing aid worn     Hyperlipidemia     Hypertension       Past Surgical History:   Procedure Laterality Date    BREAST BIOPSY Right     neg    BREAST CYST EXCISION Left 1990/ 2005    x2- neg    CHOLECYSTECTOMY  2014    COLONOSCOPY  2011    repeat in 10    COLONOSCOPY  08/16/2010    Dr He received report; polyps removed and bx negative    FINGER SURGERY      HEEL SPUR EXCISION      HYSTERECTOMY  1978    for prolapse    TONSILLECTOMY      UMBILICAL HERNIA REPAIR      UPPER GASTROINTESTINAL ENDOSCOPY  2010    Dr. He; reflux per pt report      Family History   Problem Relation Age of Onset    Cancer Paternal Grandfather     Stomach cancer Paternal Grandfather     Diabetes Maternal Grandmother     Heart disease Maternal Grandmother     Diabetes Mother     Heart disease Mother     Diabetes Brother     Heart disease Brother     Breast cancer Cousin 60    Breast cancer Maternal Cousin 50    Breast cancer Paternal Cousin 50    Breast cancer Paternal Cousin 50    Ovarian cancer Neg Hx     Colon polyps Neg Hx     Esophageal cancer Neg Hx     Crohn's disease Neg Hx     Ulcerative colitis Neg Hx       Wt Readings from Last 10 Encounters:   10/29/19 87.1 kg (192 lb 0.3 oz)   08/23/19 86.2 kg (190 lb)   03/28/19 86.2 kg (190 lb 0.6 oz)   03/19/19 86.2 kg (190 lb)   03/04/19 85.6 kg (188 lb 12.8 oz)   01/07/19 86.6 kg (191 lb)   10/03/18 86.6 kg (191 lb)   09/04/18 86 kg (189 lb 11.2 oz)   07/02/18 85.3 kg (188 lb)   05/18/18 85 kg (187 lb 8 oz)       CMP  Sodium   Date Value Ref Range Status   08/23/2019 140 136 - 145 mmol/L Final   10/15/2015 145 137 - 145 MMOL/L Final     Potassium   Date Value Ref Range Status   08/23/2019 3.8 3.5 - 5.1 mmol/L  Final   10/15/2015 3.9 3.5 - 5.1 MMOL/L Final     Chloride   Date Value Ref Range Status   08/23/2019 101 95 - 110 mmol/L Final   10/15/2015 100 98 - 107 MMOL/L Final     CO2   Date Value Ref Range Status   08/23/2019 31 22 - 31 mmol/L Final     Glucose   Date Value Ref Range Status   08/23/2019 112 (H) 70 - 110 mg/dL Final     Comment:     The ADA recommends the following guidelines for fasting glucose:  Normal:       less than 100 mg/dL  Prediabetes:  100 mg/dL to 125 mg/dL  Diabetes:     126 mg/dL or higher       BUN, Bld   Date Value Ref Range Status   08/23/2019 23 (H) 7 - 18 mg/dL Final     Creatinine   Date Value Ref Range Status   08/23/2019 0.78 0.50 - 1.40 mg/dL Final   10/15/2015 0.86 0.52 - 1.04 MG/DL Final     Calcium   Date Value Ref Range Status   08/23/2019 9.7 8.4 - 10.2 mg/dL Final     Total Protein   Date Value Ref Range Status   08/23/2019 6.3 6.0 - 8.4 g/dL Final     Albumin   Date Value Ref Range Status   08/23/2019 4.0 3.5 - 5.2 g/dL Final   10/15/2015 3.8 3.5 - 5.0 G/DL Final     Total Bilirubin   Date Value Ref Range Status   08/23/2019 0.4 0.2 - 1.3 mg/dL Final     Alkaline Phosphatase   Date Value Ref Range Status   08/23/2019 74 38 - 145 U/L Final     AST ([place])   Date Value Ref Range Status   02/04/2016 22 14 - 36 U/L Final     AST   Date Value Ref Range Status   08/23/2019 27 14 - 36 U/L Final     ALT   Date Value Ref Range Status   08/23/2019 37 10 - 44 U/L Final     Anion Gap   Date Value Ref Range Status   08/23/2019 8 8 - 16 mmol/L Final     eGFR if    Date Value Ref Range Status   08/23/2019 >60 >60 mL/min/1.73 m^2 Final     eGFR if non    Date Value Ref Range Status   08/23/2019 >60 >60 mL/min/1.73 m^2 Final     Comment:     Calculation used to obtain the estimated glomerular filtration  rate (eGFR) is the CKD-EPI equation.                 Reviewed prior medical records including endoscopy history (see surgical history).     Objective:       Physical Exam   Constitutional: She is oriented to person, place, and time. She appears well-developed and well-nourished.   HENT:   Head: Normocephalic.   Eyes: Pupils are equal, round, and reactive to light.   Neck: Normal range of motion.   Cardiovascular: Normal rate, regular rhythm and normal heart sounds.   No murmur heard.  Pulmonary/Chest: Breath sounds normal. No respiratory distress. She has no wheezes.   Abdominal: Soft. Bowel sounds are normal. She exhibits no distension and no mass. There is no tenderness. There is no guarding.   Musculoskeletal: Normal range of motion.   Neurological: She is alert and oriented to person, place, and time.   Skin: Skin is warm and dry. No rash noted.   Non jaundiced   Psychiatric: She has a normal mood and affect. Her speech is normal.         Assessment:       1. Diarrhea, unspecified type    2. Irregular bowel habits    3. Abdominal cramping    4. Gastroesophageal reflux disease without esophagitis    5. S/P cholecystectomy    6. History of colon polyps    7. History of constipation           Plan:   All diagnoses and orders for this visit:    Diarrhea, unspecified type & Irregular bowel habits  - Clostridium difficile EIA; Future; Expected date: 10/29/2019  - Stool Exam-Ova,Cysts,Parasites; Future; Expected date: 10/29/2019  - Stool culture; Future; Expected date: 10/29/2019  - Giardia / Cryptosporidum, EIA; Future; Expected date: 10/29/2019  - Occult blood x 1, stool; Future; Expected date: 10/29/2019  - WBC, Stool; Future; Expected date: 10/29/2019  - Rotavirus antigen, stool; Future; Expected date: 10/29/2019  - Adenovirus Molecular Detection, PCR, Non-Blood Stool; Future; Expected date: 10/29/2019  - Start Probiotic once daily  - Recommended increase fiber in diet, especially soluble fiber since this can help bulk up the stool consistency and may help to slow down how fast the stool goes through the colon and can prevent diarrhea  - Schedule Colonoscopy,  discussed procedure with the patient, including risks and benefits, patient verbalized understanding    Abdominal cramping  - Start: dicyclomine (BENTYL) 10 MG capsule; Take 1 capsule (10 mg total) by mouth 4 (four) times daily before meals and nightly.  Dispense: 120 capsule; Refill: 0  - Consider CT of abdomen if symptoms persist/worsen    Gastroesophageal reflux disease without esophagitis   - Continue Protonix 40 mg once daily   - Take PPI in the morning 30-60 minutes before breakfast   - Educated patient on lifestyle modifications to help control/reduce reflux/abdominal pain including: avoid large meals, avoid eating within 2-3 hours of bedtime (avoid late night eating & lying down soon after eating), elevate head of bed if nocturnal symptoms are present, smoking cessation (if current smoker), & weight loss (if overweight).    - Educated to avoid known foods which trigger reflux symptoms & to minimize/avoid high-fat foods, chocolate, caffeine, citrus, alcohol, & tomato products.   - Advised to avoid/limit use of NSAID's, since they can cause GI upset, bleeding, and/or ulcers. If needed, take with food.     S/P cholecystectomy    History of colon polyps    History of constipation    If no improvement in symptoms or symptoms worsen, call/follow-up at clinic or go to ER

## 2019-10-29 NOTE — PATIENT INSTRUCTIONS

## 2019-11-05 ENCOUNTER — LAB VISIT (OUTPATIENT)
Dept: LAB | Facility: HOSPITAL | Age: 69
End: 2019-11-05
Attending: INTERNAL MEDICINE
Payer: MEDICARE

## 2019-11-05 DIAGNOSIS — R19.7 DIARRHEA, UNSPECIFIED TYPE: ICD-10-CM

## 2019-11-05 LAB
C DIFF GDH STL QL: NEGATIVE
C DIFF TOX A+B STL QL IA: NEGATIVE
OB PNL STL: NEGATIVE
WBC #/AREA STL HPF: NORMAL /[HPF]

## 2019-11-05 PROCEDURE — 87324 CLOSTRIDIUM AG IA: CPT

## 2019-11-05 PROCEDURE — 87798 DETECT AGENT NOS DNA AMP: CPT

## 2019-11-05 PROCEDURE — 87425 ROTAVIRUS AG IA: CPT

## 2019-11-05 PROCEDURE — 87046 STOOL CULTR AEROBIC BACT EA: CPT

## 2019-11-05 PROCEDURE — 87328 CRYPTOSPORIDIUM AG IA: CPT

## 2019-11-05 PROCEDURE — 89055 LEUKOCYTE ASSESSMENT FECAL: CPT

## 2019-11-05 PROCEDURE — 87427 SHIGA-LIKE TOXIN AG IA: CPT | Mod: 59

## 2019-11-05 PROCEDURE — 82272 OCCULT BLD FECES 1-3 TESTS: CPT

## 2019-11-05 PROCEDURE — 87045 FECES CULTURE AEROBIC BACT: CPT

## 2019-11-06 LAB
CRYPTOSP AG STL QL IA: NEGATIVE
E COLI SXT1 STL QL IA: NEGATIVE
E COLI SXT2 STL QL IA: NEGATIVE
G LAMBLIA AG STL QL IA: NEGATIVE
RV AG STL QL IA.RAPID: NEGATIVE

## 2019-11-07 ENCOUNTER — TELEPHONE (OUTPATIENT)
Dept: SPORTS MEDICINE | Facility: CLINIC | Age: 69
End: 2019-11-07

## 2019-11-07 NOTE — TELEPHONE ENCOUNTER
Left message for patient to call back to reschedule her appointment to see MARIBEL Sprague first for her hip pain.

## 2019-11-08 ENCOUNTER — PATIENT MESSAGE (OUTPATIENT)
Dept: SPORTS MEDICINE | Facility: CLINIC | Age: 69
End: 2019-11-08

## 2019-11-08 ENCOUNTER — PATIENT MESSAGE (OUTPATIENT)
Dept: OBSTETRICS AND GYNECOLOGY | Facility: CLINIC | Age: 69
End: 2019-11-08

## 2019-11-08 ENCOUNTER — TELEPHONE (OUTPATIENT)
Dept: SPORTS MEDICINE | Facility: CLINIC | Age: 69
End: 2019-11-08

## 2019-11-08 LAB
BACTERIA STL CULT: NORMAL
HADV DNA SERPL QL NAA+PROBE: NEGATIVE
SPECIMEN SOURCE: NORMAL

## 2019-11-08 NOTE — TELEPHONE ENCOUNTER
Called patient and left another message as well as sent a portal message to get patient to call back to reschedule her appointment to see MARIBEL Sprague first for hip pain.

## 2019-11-11 DIAGNOSIS — Z12.31 VISIT FOR SCREENING MAMMOGRAM: Primary | ICD-10-CM

## 2019-11-12 ENCOUNTER — TELEPHONE (OUTPATIENT)
Dept: GASTROENTEROLOGY | Facility: CLINIC | Age: 69
End: 2019-11-12

## 2019-11-12 NOTE — TELEPHONE ENCOUNTER
----- Message from Jennifer Mulligan NP sent at 11/12/2019  9:50 AM CST -----  Please let patient know her stool studies are negative. Continue with scheduled colonoscopy.

## 2019-11-19 ENCOUNTER — TELEPHONE (OUTPATIENT)
Dept: SPORTS MEDICINE | Facility: CLINIC | Age: 69
End: 2019-11-19

## 2019-11-20 ENCOUNTER — TELEPHONE (OUTPATIENT)
Dept: SPINE | Facility: CLINIC | Age: 69
End: 2019-11-20

## 2019-11-20 ENCOUNTER — OFFICE VISIT (OUTPATIENT)
Dept: SPORTS MEDICINE | Facility: CLINIC | Age: 69
End: 2019-11-20
Payer: MEDICARE

## 2019-11-20 ENCOUNTER — HOSPITAL ENCOUNTER (OUTPATIENT)
Dept: RADIOLOGY | Facility: HOSPITAL | Age: 69
Discharge: HOME | End: 2019-11-20
Attending: PHYSICIAN ASSISTANT
Payer: MEDICARE

## 2019-11-20 VITALS
WEIGHT: 192 LBS | HEIGHT: 60 IN | SYSTOLIC BLOOD PRESSURE: 183 MMHG | DIASTOLIC BLOOD PRESSURE: 83 MMHG | HEART RATE: 83 BPM | BODY MASS INDEX: 37.69 KG/M2

## 2019-11-20 DIAGNOSIS — M54.16 LUMBAR RADICULOPATHY: ICD-10-CM

## 2019-11-20 DIAGNOSIS — M76.31 ILIOTIBIAL BAND SYNDROME OF RIGHT SIDE: ICD-10-CM

## 2019-11-20 DIAGNOSIS — M25.551 RIGHT HIP PAIN: ICD-10-CM

## 2019-11-20 DIAGNOSIS — M70.61 GREATER TROCHANTERIC BURSITIS, RIGHT: Primary | ICD-10-CM

## 2019-11-20 PROCEDURE — 1101F PT FALLS ASSESS-DOCD LE1/YR: CPT | Mod: CPTII,S$GLB,, | Performed by: PHYSICIAN ASSISTANT

## 2019-11-20 PROCEDURE — 1101F PR PT FALLS ASSESS DOC 0-1 FALLS W/OUT INJ PAST YR: ICD-10-PCS | Mod: CPTII,S$GLB,, | Performed by: PHYSICIAN ASSISTANT

## 2019-11-20 PROCEDURE — 1159F MED LIST DOCD IN RCRD: CPT | Mod: S$GLB,,, | Performed by: PHYSICIAN ASSISTANT

## 2019-11-20 PROCEDURE — 1126F AMNT PAIN NOTED NONE PRSNT: CPT | Mod: S$GLB,,, | Performed by: PHYSICIAN ASSISTANT

## 2019-11-20 PROCEDURE — 99214 PR OFFICE/OUTPT VISIT, EST, LEVL IV, 30-39 MIN: ICD-10-PCS | Mod: S$GLB,,, | Performed by: PHYSICIAN ASSISTANT

## 2019-11-20 PROCEDURE — 73521 X-RAY EXAM HIPS BI 2 VIEWS: CPT | Mod: 26,,, | Performed by: RADIOLOGY

## 2019-11-20 PROCEDURE — 99999 PR PBB SHADOW E&M-EST. PATIENT-LVL V: ICD-10-PCS | Mod: PBBFAC,,, | Performed by: PHYSICIAN ASSISTANT

## 2019-11-20 PROCEDURE — 3077F SYST BP >= 140 MM HG: CPT | Mod: CPTII,S$GLB,, | Performed by: PHYSICIAN ASSISTANT

## 2019-11-20 PROCEDURE — 3079F DIAST BP 80-89 MM HG: CPT | Mod: CPTII,S$GLB,, | Performed by: PHYSICIAN ASSISTANT

## 2019-11-20 PROCEDURE — 73521 X-RAY EXAM HIPS BI 2 VIEWS: CPT | Mod: TC

## 2019-11-20 PROCEDURE — 99999 PR PBB SHADOW E&M-EST. PATIENT-LVL V: CPT | Mod: PBBFAC,,, | Performed by: PHYSICIAN ASSISTANT

## 2019-11-20 PROCEDURE — 1126F PR PAIN SEVERITY QUANTIFIED, NO PAIN PRESENT: ICD-10-PCS | Mod: S$GLB,,, | Performed by: PHYSICIAN ASSISTANT

## 2019-11-20 PROCEDURE — 99214 OFFICE O/P EST MOD 30 MIN: CPT | Mod: S$GLB,,, | Performed by: PHYSICIAN ASSISTANT

## 2019-11-20 PROCEDURE — 3077F PR MOST RECENT SYSTOLIC BLOOD PRESSURE >= 140 MM HG: ICD-10-PCS | Mod: CPTII,S$GLB,, | Performed by: PHYSICIAN ASSISTANT

## 2019-11-20 PROCEDURE — 3079F PR MOST RECENT DIASTOLIC BLOOD PRESSURE 80-89 MM HG: ICD-10-PCS | Mod: CPTII,S$GLB,, | Performed by: PHYSICIAN ASSISTANT

## 2019-11-20 PROCEDURE — 1159F PR MEDICATION LIST DOCUMENTED IN MEDICAL RECORD: ICD-10-PCS | Mod: S$GLB,,, | Performed by: PHYSICIAN ASSISTANT

## 2019-11-20 PROCEDURE — 73521 XR HIPS BILATERAL 2 VIEW INCL AP PELVIS: ICD-10-PCS | Mod: 26,,, | Performed by: RADIOLOGY

## 2019-11-20 RX ORDER — DIAZEPAM 2 MG/1
TABLET ORAL
Qty: 2 TABLET | Refills: 0 | Status: SHIPPED | OUTPATIENT
Start: 2019-11-20 | End: 2020-01-09 | Stop reason: CLARIF

## 2019-11-20 NOTE — PROGRESS NOTES
CC: right hip pain, previously seen by Sports Medicine in 2016 for the same problem    HPI:   Julee Ballesteros presents for 10+ years of lower back pain and right hip pain. Pain started over 10 years ago when she suffered injury to lower back. Since then she has had many injections (CSI) in the trochanteric bursa, sacroiliac, and lower back. The former two provided her no relief. The lower back injections provided her mild to moderated relief. Pain is becoming progressively worse. Pain is located over (points to) right side lumbar and lateral hip. She reports that the pain is a 9 /10 sore, aching and throbbing pain usually after walking more than 5 minutes. She has had MRIs of both the lower back and hip, last in 2014. She reports L3-L4, L4-L5 disk bulging. She has also done several bout of physical therapy (3 months or more) with no relief. Today and not responding adequately to conservative measures which have included activity modifications, rest, and oral medication. Is affecting ADLs and limiting desired level of activity. Denies numbness, tingling, radiation, and inability to bear weight.  Pain is 10 /10 at its worst    Mechanical symptoms: julia  Subjective instability: (--)   Worse with increased activity  Better with rest.   Nocturnal symptoms: (+)    No previous surgeries or trauma on right hip        Review of Systems   Constitution: Negative. Negative for chills, fever and night sweats.   HENT: Negative for congestion and headaches.    Eyes: Negative for blurred vision, left vision loss and right vision loss.   Cardiovascular: Negative for chest pain and syncope.   Respiratory: Negative for cough and shortness of breath.    Endocrine: Negative for polydipsia, polyphagia and polyuria.   Hematologic/Lymphatic: Negative for bleeding problem. Does not bruise/bleed easily.   Skin: Negative for dry skin, itching and rash.   Musculoskeletal: Negative for falls and muscle weakness.   Gastrointestinal: Negative for  abdominal pain and bowel incontinence.   Genitourinary: Negative for bladder incontinence and nocturia.   Neurological: Negative for disturbances in coordination, loss of balance and seizures.   Psychiatric/Behavioral: Negative for depression. The patient does not have insomnia.    Allergic/Immunologic: Negative for hives and persistent infections.     PAST MEDICAL HISTORY:   Past Medical History:   Diagnosis Date    Colon polyp     GERD (gastroesophageal reflux disease)     H/O bronchitis     H/O: pneumonia     Hearing aid worn     Hyperlipidemia     Hypertension      PAST SURGICAL HISTORY:   Past Surgical History:   Procedure Laterality Date    BREAST BIOPSY Right     neg    BREAST CYST EXCISION Left 1990/ 2005    x2- neg    CHOLECYSTECTOMY  2014    COLONOSCOPY  2011    repeat in 10    COLONOSCOPY  08/16/2010    Dr He received report; polyps removed and bx negative    FINGER SURGERY      HEEL SPUR EXCISION      HYSTERECTOMY  1978    for prolapse    TONSILLECTOMY      UMBILICAL HERNIA REPAIR      UPPER GASTROINTESTINAL ENDOSCOPY  2010    Dr. He; reflux per pt report     FAMILY HISTORY:   Family History   Problem Relation Age of Onset    Cancer Paternal Grandfather     Stomach cancer Paternal Grandfather     Diabetes Maternal Grandmother     Heart disease Maternal Grandmother     Diabetes Mother     Heart disease Mother     Diabetes Brother     Heart disease Brother     Breast cancer Cousin 60    Breast cancer Maternal Cousin 50    Breast cancer Paternal Cousin 50    Breast cancer Paternal Cousin 50    Ovarian cancer Neg Hx     Colon polyps Neg Hx     Esophageal cancer Neg Hx     Crohn's disease Neg Hx     Ulcerative colitis Neg Hx      SOCIAL HISTORY:   Social History     Socioeconomic History    Marital status:      Spouse name: Not on file    Number of children: Not on file    Years of education: Not on file    Highest education level: Not on file    Occupational History    Not on file   Social Needs    Financial resource strain: Not hard at all    Food insecurity:     Worry: Never true     Inability: Never true    Transportation needs:     Medical: No     Non-medical: No   Tobacco Use    Smoking status: Never Smoker    Smokeless tobacco: Never Used   Substance and Sexual Activity    Alcohol use: Yes     Alcohol/week: 0.0 standard drinks     Frequency: 2-4 times a month     Drinks per session: 1 or 2     Binge frequency: Never     Comment: Social     Drug use: No    Sexual activity: Yes     Partners: Male     Birth control/protection: See Surgical Hx   Lifestyle    Physical activity:     Days per week: 2 days     Minutes per session: Not on file    Stress: Only a little   Relationships    Social connections:     Talks on phone: More than three times a week     Gets together: Twice a week     Attends Judaism service: Not on file     Active member of club or organization: Yes     Attends meetings of clubs or organizations: More than 4 times per year     Relationship status:    Other Topics Concern    Not on file   Social History Narrative    Not on file       MEDICATIONS:   Current Outpatient Medications:     acyclovir (ZOVIRAX) 800 MG Tab, Take 800 mg by mouth once daily. , Disp: , Rfl:     b complex vitamins tablet, Take 1 tablet by mouth once daily., Disp: , Rfl:     cetirizine (ZYRTEC) 10 MG tablet, Take 10 mg by mouth once daily., Disp: , Rfl:     cholecalciferol, vitamin D3, (VITAMIN D3) 5,000 unit Tab, Take 5,000 Units by mouth once daily., Disp: , Rfl:     co-enzyme Q-10 30 mg capsule, , Disp: , Rfl:     diclofenac sodium 100 mg 24 hr tablet, , Disp: , Rfl:     dicyclomine (BENTYL) 10 MG capsule, Take 1 capsule (10 mg total) by mouth 4 (four) times daily before meals and nightly., Disp: 120 capsule, Rfl: 0    doxycycline (PERIOSTAT) 20 MG tablet, Take 20 mg by mouth once daily. , Disp: , Rfl:     gabapentin (NEURONTIN) 100  MG capsule, TAKE 1 CAPSULE EVERY MORNING  AND TAKE 2 CAPSULES AT BEDTIME, Disp: 270 capsule, Rfl: 3    ketoconazole (NIZORAL) 2 % cream, , Disp: , Rfl:     lisinopril-hydrochlorothiazide (PRINZIDE,ZESTORETIC) 20-25 mg Tab, Take 1 tablet by mouth once daily., Disp: 90 tablet, Rfl: 3    pantoprazole (PROTONIX) 40 MG tablet, Take 1 tablet (40 mg total) by mouth once daily., Disp: 90 tablet, Rfl: 3    simvastatin (ZOCOR) 40 MG tablet, Take 1 tablet (40 mg total) by mouth every evening., Disp: 90 tablet, Rfl: 3    diazePAM (VALIUM) 2 MG tablet, Take one tablet 30 minutes prior to MRI. Do not drive under the influence of this medication., Disp: 2 tablet, Rfl: 0  ALLERGIES:   Review of patient's allergies indicates:   Allergen Reactions    Cefdinir Other (See Comments)    Bactrim [sulfamethoxazole-trimethoprim] Rash    Darvon [propoxyphene] Nausea Only    Feldene [piroxicam] Hives    Tramadol Nausea Only       VITAL SIGNS: BP (!) 183/83   Pulse 83   Ht 5' (1.524 m)   Wt 87.1 kg (192 lb)   BMI 37.50 kg/m²        PHYSICAL EXAM /  HIP  PHYSICAL EXAMINATION  General:  The patient is alert and oriented x 3.  Mood is pleasant.  Observation of ears, eyes and nose reveal no gross abnormalities.  HEENT: NCAT, sclera nonicteric  Lungs: Respirations are equal and unlabored..    right HIP EXAMINATION     OBSERVATION / INSPECTION  Gait:   Nonantalgic   Alignment:  Neutral   Scars:   None   Muscle atrophy: None   Effusion:  None   Warmth:  None   Discoloration:   None   Leg lengths:   Equal   Pelvis:   Level     TENDERNESS / CREPITUS (T/C):      T / C  Trochanteric bursa   + / -  Piriformis    - / -  SI joint    - / -  Psoas tendon   + / -  Rectus insertion  - / -  Adductor insertion  - / -  Pubic symphysis  - / -  IT Band   +/-, exaggerated pain response with light palpation    ROM: (* = pain)    Flexion:    *120 degrees  External rotation: *40 degrees  Internal rotation with axial load: *10 degrees  Internal rotation  without axial load: 30 degrees  Abduction:  *45 degrees  Adduction:   20 degrees    SPECIAL TESTS:  Pain w/ forced internal rotation (FADIR): -   Pain w/ forced external rotation (RAFAELA): +  Circumduction test:    -  Stinchfield test:    Negative   Log roll:      Negative   Snapping hip (internal):   Negative   Sit-up pain:     Negative   Resisted sit-up pain:    Negative   Resisted sit-up with adductor contraction pain:  Negative   Step-down test:    +  Trendelenburg test:    Negative  Bridge test     +     EXTREMITY NEURO-VASCULAR EXAMINATION:   Sensation:  Grossly intact to light touch all dermatomal regions.   Motor Function:  Fully intact motor function at hip, knee, foot and ankle    DTRs;  quadriceps and  achilles 2+.  No clonus and downgoing Babinski.    Vascular status:  DP and PT pulses 2+, brisk capillary refill, symmetric.    Skin:  intact, compartments soft.    OTHER FINDINGS:  +TTP over right lumbar spine and right gluteus     Radiographic Findings 11/20/2019:    Right: There is mild DJD.  No fracture dislocation bone destruction seen.  Left: There is mild DJD.  No fracture dislocation bone destruction seen.    Xrays of the right hip/pelvis were ordered and reviewed by me today. These findings were discussed and reviewed with the patient.      ASSESSMENT:      ICD-10-CM ICD-9-CM   1. Greater trochanteric bursitis, right M70.61 726.5   2. Iliotibial band syndrome of right side M76.31 728.89   3. Right hip pain M25.551 719.45   4. Lumbar radiculopathy M54.16 724.4       PLAN:  The total face-to-face encounter time with this patient was 45 min and greater than 50% of of the encounter time was spent counseling the patient, coordinating care, and education regarding the pathology and natural history of her diagnosis. We have discussed a variety of treatment options including medications, injections, physical therapy and other alternative treatments. Given the patient's history and physical exam, I believe  most of her pain is caused by her lower back but may have some contributing pain from hip OA and trochanteric bursa. Will obtain a MRI of the right hip and refer to Spine Surgery for lower back management.      1. MRI right hip to evaluate hip OA and bursal pathology. Will call with results.   2. Ambulatory referral to Spine Surgery ([place]) for lower back management.  3. Ice compress to the affected area 2-3x a day for 15-20 minutes as needed for pain management.  4. RTC to see Altaf Heredia PA-C for further management and follow-up.      I made the decision to obtain old records of the patient including previous notes and imaging. I independently reviewed and interpreted lab results today as well as prior imaging.     All of the patient's questions were answered and the patient will contact us if they have any questions or concerns in the interim.

## 2019-11-20 NOTE — TELEPHONE ENCOUNTER
----- Message from Julee Perez MA sent at 11/20/2019 12:54 PM CST -----  Regarding: Patient Scheduling   Altaf Parada would like this patient to be seen for her lower back pain. The patient would prefer to be seen in the [place] area, does Lina Paul still do clinic at a [place] location? If so, would you mind reaching out to schedule this patient? I am only able to access a schedule for the [place] location. If there is no longer a [place] location, I'd be happy to call and get her scheduled elsewhere! Please let me know if you have any questions or if there is anything I can do to help!     Thanks,   Julee Perez, Medical Assistant   [facility]

## 2019-11-20 NOTE — TELEPHONE ENCOUNTER
Called patient to schedule an appointment for her to see Lina Paul, got voicemail, left return call message.

## 2019-11-27 ENCOUNTER — HOSPITAL ENCOUNTER (OUTPATIENT)
Dept: RADIOLOGY | Facility: HOSPITAL | Age: 69
Discharge: HOME | End: 2019-11-27
Attending: PHYSICIAN ASSISTANT
Payer: MEDICARE

## 2019-11-27 DIAGNOSIS — M76.31 ILIOTIBIAL BAND SYNDROME OF RIGHT SIDE: ICD-10-CM

## 2019-11-27 DIAGNOSIS — R10.9 ABDOMINAL CRAMPING: ICD-10-CM

## 2019-11-27 DIAGNOSIS — M70.61 GREATER TROCHANTERIC BURSITIS, RIGHT: ICD-10-CM

## 2019-11-27 DIAGNOSIS — M25.551 RIGHT HIP PAIN: ICD-10-CM

## 2019-11-27 PROCEDURE — 73721 MRI JNT OF LWR EXTRE W/O DYE: CPT | Mod: 26,RT,, | Performed by: RADIOLOGY

## 2019-11-27 PROCEDURE — 73721 MRI JNT OF LWR EXTRE W/O DYE: CPT | Mod: TC,PO,RT

## 2019-11-27 PROCEDURE — 73721 MRI HIP WITHOUT CONTRAST RIGHT: ICD-10-PCS | Mod: 26,RT,, | Performed by: RADIOLOGY

## 2019-11-27 RX ORDER — DICYCLOMINE HYDROCHLORIDE 10 MG/1
10 CAPSULE ORAL
Qty: 120 CAPSULE | Refills: 0 | Status: SHIPPED | OUTPATIENT
Start: 2019-11-27 | End: 2019-12-27

## 2019-11-29 ENCOUNTER — PATIENT MESSAGE (OUTPATIENT)
Dept: SPORTS MEDICINE | Facility: CLINIC | Age: 69
End: 2019-11-29

## 2019-11-29 ENCOUNTER — TELEPHONE (OUTPATIENT)
Dept: GASTROENTEROLOGY | Facility: CLINIC | Age: 69
End: 2019-11-29

## 2019-11-29 NOTE — TELEPHONE ENCOUNTER
----- Message from Tayla Em sent at 11/29/2019  2:37 PM CST -----  Contact: patient   Pt need to reschedule appt for colonoscopy, she is requesting a later day at the end of the month. Please call back at [phone] (jpna)

## 2019-12-02 ENCOUNTER — TELEPHONE (OUTPATIENT)
Dept: SPORTS MEDICINE | Facility: CLINIC | Age: 69
End: 2019-12-02

## 2019-12-13 ENCOUNTER — TELEPHONE (OUTPATIENT)
Dept: RHEUMATOLOGY | Facility: CLINIC | Age: 69
End: 2019-12-13

## 2019-12-13 NOTE — TELEPHONE ENCOUNTER
----- Message from Dhruv PHILIP Frisard sent at 12/13/2019 10:38 AM CST -----  Contact: same  Patient called in and stated she missed call from office & the only thing she can think it would be about is her MRI scheduled for Monday 12/16/19.    Patient call back number is [phone]

## 2019-12-16 ENCOUNTER — HOSPITAL ENCOUNTER (OUTPATIENT)
Dept: RADIOLOGY | Facility: HOSPITAL | Age: 69
Discharge: HOME | End: 2019-12-16
Attending: PAIN MEDICINE
Payer: MEDICARE

## 2019-12-16 DIAGNOSIS — M54.17 RADICULOPATHY, LUMBOSACRAL REGION: ICD-10-CM

## 2019-12-16 PROCEDURE — 72100 X-RAY EXAM L-S SPINE 2/3 VWS: CPT | Mod: TC,FY,PO

## 2019-12-16 PROCEDURE — 72148 MRI LUMBAR SPINE W/O DYE: CPT | Mod: TC,PO

## 2019-12-16 PROCEDURE — 72148 MRI LUMBAR SPINE W/O DYE: CPT | Mod: 26,,, | Performed by: RADIOLOGY

## 2019-12-16 PROCEDURE — 72120 X-RAY BEND ONLY L-S SPINE: CPT | Mod: 26,,, | Performed by: RADIOLOGY

## 2019-12-16 PROCEDURE — 72148 MRI LUMBAR SPINE WITHOUT CONTRAST: ICD-10-PCS | Mod: 26,,, | Performed by: RADIOLOGY

## 2019-12-16 PROCEDURE — 72100 XR LUMBAR SPINE AP AND LAT WITH FLEX/EXT: ICD-10-PCS | Mod: 26,,, | Performed by: RADIOLOGY

## 2019-12-16 PROCEDURE — 72100 X-RAY EXAM L-S SPINE 2/3 VWS: CPT | Mod: 26,,, | Performed by: RADIOLOGY

## 2019-12-16 PROCEDURE — 72120 XR LUMBAR SPINE AP AND LAT WITH FLEX/EXT: ICD-10-PCS | Mod: 26,,, | Performed by: RADIOLOGY

## 2019-12-18 ENCOUNTER — PATIENT MESSAGE (OUTPATIENT)
Dept: SPORTS MEDICINE | Facility: CLINIC | Age: 69
End: 2019-12-18

## 2020-01-02 ENCOUNTER — PATIENT MESSAGE (OUTPATIENT)
Dept: ENDOSCOPY | Facility: HOSPITAL | Age: 70
End: 2020-01-02

## 2020-01-10 ENCOUNTER — PATIENT MESSAGE (OUTPATIENT)
Dept: OBSTETRICS AND GYNECOLOGY | Facility: CLINIC | Age: 70
End: 2020-01-10

## 2020-01-10 ENCOUNTER — CLINICAL SUPPORT (OUTPATIENT)
Dept: OBSTETRICS AND GYNECOLOGY | Facility: CLINIC | Age: 70
End: 2020-01-10
Payer: MEDICARE

## 2020-01-10 DIAGNOSIS — R39.89 POSSIBLE URINARY TRACT INFECTION: Primary | ICD-10-CM

## 2020-01-10 PROCEDURE — 87086 URINE CULTURE/COLONY COUNT: CPT

## 2020-01-10 PROCEDURE — 87077 CULTURE AEROBIC IDENTIFY: CPT

## 2020-01-10 PROCEDURE — 87088 URINE BACTERIA CULTURE: CPT

## 2020-01-10 PROCEDURE — 87186 SC STD MICRODIL/AGAR DIL: CPT

## 2020-01-13 ENCOUNTER — PATIENT MESSAGE (OUTPATIENT)
Dept: OBSTETRICS AND GYNECOLOGY | Facility: CLINIC | Age: 70
End: 2020-01-13

## 2020-01-13 RX ORDER — CIPROFLOXACIN 500 MG/1
500 TABLET ORAL 2 TIMES DAILY
Qty: 14 TABLET | Refills: 0 | Status: SHIPPED | OUTPATIENT
Start: 2020-01-13 | End: 2020-01-20

## 2020-01-13 NOTE — TELEPHONE ENCOUNTER
Pt stated she went to see a NP because she was in a lot of pain. Pt given nitrofurantoin and wonders if she should continue to take the medication the NP prescribed or the new medication.    Please advise.

## 2020-01-14 LAB — BACTERIA UR CULT: ABNORMAL

## 2020-01-15 PROBLEM — M54.17 LUMBOSACRAL RADICULOPATHY: Status: ACTIVE | Noted: 2020-01-15

## 2020-01-17 ENCOUNTER — HOSPITAL ENCOUNTER (OUTPATIENT)
Dept: RADIOLOGY | Facility: HOSPITAL | Age: 70
Discharge: HOME | End: 2020-01-17
Attending: OBSTETRICS & GYNECOLOGY
Payer: MEDICARE

## 2020-01-17 VITALS — HEIGHT: 60 IN | WEIGHT: 192.88 LBS | BODY MASS INDEX: 37.87 KG/M2

## 2020-01-17 DIAGNOSIS — Z12.31 VISIT FOR SCREENING MAMMOGRAM: ICD-10-CM

## 2020-01-17 PROCEDURE — 77063 BREAST TOMOSYNTHESIS BI: CPT | Mod: 26,,, | Performed by: RADIOLOGY

## 2020-01-17 PROCEDURE — 77067 MAMMO DIGITAL SCREENING BILAT WITH TOMOSYNTHESIS_CAD: ICD-10-PCS | Mod: 26,,, | Performed by: RADIOLOGY

## 2020-01-17 PROCEDURE — 77067 SCR MAMMO BI INCL CAD: CPT | Mod: 26,,, | Performed by: RADIOLOGY

## 2020-01-17 PROCEDURE — 77063 MAMMO DIGITAL SCREENING BILAT WITH TOMOSYNTHESIS_CAD: ICD-10-PCS | Mod: 26,,, | Performed by: RADIOLOGY

## 2020-01-17 PROCEDURE — 77067 SCR MAMMO BI INCL CAD: CPT | Mod: TC,PN

## 2020-02-18 ENCOUNTER — PATIENT MESSAGE (OUTPATIENT)
Dept: ENDOSCOPY | Facility: HOSPITAL | Age: 70
End: 2020-02-18

## 2020-02-18 ENCOUNTER — PATIENT MESSAGE (OUTPATIENT)
Dept: GASTROENTEROLOGY | Facility: CLINIC | Age: 70
End: 2020-02-18

## 2020-02-18 DIAGNOSIS — R10.9 ABDOMINAL CRAMPING: Primary | ICD-10-CM

## 2020-02-18 RX ORDER — DICYCLOMINE HYDROCHLORIDE 10 MG/1
10 CAPSULE ORAL
Qty: 120 CAPSULE | Refills: 2 | Status: SHIPPED | OUTPATIENT
Start: 2020-02-18 | End: 2020-02-20 | Stop reason: SDUPTHER

## 2020-02-18 NOTE — TELEPHONE ENCOUNTER
Informed pt that Jennifer Mulligan NP sent her Rx in to her preferred pharmacy. Pt verbalized understanding.

## 2020-02-19 ENCOUNTER — ANESTHESIA EVENT (OUTPATIENT)
Dept: ENDOSCOPY | Facility: HOSPITAL | Age: 70
End: 2020-02-19
Payer: MEDICARE

## 2020-02-19 ENCOUNTER — ANESTHESIA (OUTPATIENT)
Dept: ENDOSCOPY | Facility: HOSPITAL | Age: 70
End: 2020-02-19
Payer: MEDICARE

## 2020-02-19 ENCOUNTER — HOSPITAL ENCOUNTER (OUTPATIENT)
Facility: HOSPITAL | Age: 70
Discharge: HOME | End: 2020-02-19
Attending: INTERNAL MEDICINE | Admitting: INTERNAL MEDICINE
Payer: MEDICARE

## 2020-02-19 VITALS
SYSTOLIC BLOOD PRESSURE: 130 MMHG | TEMPERATURE: 98 F | HEART RATE: 80 BPM | OXYGEN SATURATION: 97 % | WEIGHT: 186 LBS | HEIGHT: 60 IN | RESPIRATION RATE: 14 BRPM | DIASTOLIC BLOOD PRESSURE: 62 MMHG | BODY MASS INDEX: 36.52 KG/M2

## 2020-02-19 DIAGNOSIS — R19.4 CHANGE IN BOWEL HABITS: ICD-10-CM

## 2020-02-19 PROCEDURE — 25000003 PHARM REV CODE 250: Mod: PO | Performed by: ANESTHESIOLOGY

## 2020-02-19 PROCEDURE — 45380 COLONOSCOPY AND BIOPSY: CPT | Mod: PO | Performed by: INTERNAL MEDICINE

## 2020-02-19 PROCEDURE — 45380 PR COLONOSCOPY,BIOPSY: ICD-10-PCS | Mod: ,,, | Performed by: INTERNAL MEDICINE

## 2020-02-19 PROCEDURE — D9220A PRA ANESTHESIA: ICD-10-PCS | Mod: CRNA,,, | Performed by: NURSE ANESTHETIST, CERTIFIED REGISTERED

## 2020-02-19 PROCEDURE — 63600175 PHARM REV CODE 636 W HCPCS: Mod: PO | Performed by: NURSE ANESTHETIST, CERTIFIED REGISTERED

## 2020-02-19 PROCEDURE — 63600175 PHARM REV CODE 636 W HCPCS: Mod: PO | Performed by: ANESTHESIOLOGY

## 2020-02-19 PROCEDURE — 37000009 HC ANESTHESIA EA ADD 15 MINS: Mod: PO | Performed by: INTERNAL MEDICINE

## 2020-02-19 PROCEDURE — 88305 TISSUE EXAM BY PATHOLOGIST: CPT | Performed by: PATHOLOGY

## 2020-02-19 PROCEDURE — D9220A PRA ANESTHESIA: Mod: ANES,,, | Performed by: ANESTHESIOLOGY

## 2020-02-19 PROCEDURE — 37000008 HC ANESTHESIA 1ST 15 MINUTES: Mod: PO | Performed by: INTERNAL MEDICINE

## 2020-02-19 PROCEDURE — 63600175 PHARM REV CODE 636 W HCPCS: Mod: PO | Performed by: INTERNAL MEDICINE

## 2020-02-19 PROCEDURE — D9220A PRA ANESTHESIA: ICD-10-PCS | Mod: ANES,,, | Performed by: ANESTHESIOLOGY

## 2020-02-19 PROCEDURE — 88305 TISSUE EXAM BY PATHOLOGIST: CPT | Mod: 26,,, | Performed by: PATHOLOGY

## 2020-02-19 PROCEDURE — 27201012 HC FORCEPS, HOT/COLD, DISP: Mod: PO | Performed by: INTERNAL MEDICINE

## 2020-02-19 PROCEDURE — D9220A PRA ANESTHESIA: Mod: CRNA,,, | Performed by: NURSE ANESTHETIST, CERTIFIED REGISTERED

## 2020-02-19 PROCEDURE — 45380 COLONOSCOPY AND BIOPSY: CPT | Mod: ,,, | Performed by: INTERNAL MEDICINE

## 2020-02-19 PROCEDURE — 88305 TISSUE EXAM BY PATHOLOGIST: ICD-10-PCS | Mod: 26,,, | Performed by: PATHOLOGY

## 2020-02-19 RX ORDER — PROPOFOL 10 MG/ML
VIAL (ML) INTRAVENOUS CONTINUOUS PRN
Status: DISCONTINUED | OUTPATIENT
Start: 2020-02-19 | End: 2020-02-19

## 2020-02-19 RX ORDER — PROPOFOL 10 MG/ML
VIAL (ML) INTRAVENOUS
Status: DISCONTINUED | OUTPATIENT
Start: 2020-02-19 | End: 2020-02-19

## 2020-02-19 RX ORDER — SODIUM CHLORIDE 0.9 % (FLUSH) 0.9 %
10 SYRINGE (ML) INJECTION
Status: DISCONTINUED | OUTPATIENT
Start: 2020-02-19 | End: 2020-02-19 | Stop reason: HOSPADM

## 2020-02-19 RX ORDER — LIDOCAINE HYDROCHLORIDE 10 MG/ML
1 INJECTION INFILTRATION; PERINEURAL ONCE
Status: COMPLETED | OUTPATIENT
Start: 2020-02-19 | End: 2020-02-19

## 2020-02-19 RX ORDER — LIDOCAINE HYDROCHLORIDE 20 MG/ML
INJECTION INTRAVENOUS
Status: DISCONTINUED | OUTPATIENT
Start: 2020-02-19 | End: 2020-02-19

## 2020-02-19 RX ORDER — SODIUM CHLORIDE, SODIUM LACTATE, POTASSIUM CHLORIDE, CALCIUM CHLORIDE 600; 310; 30; 20 MG/100ML; MG/100ML; MG/100ML; MG/100ML
INJECTION, SOLUTION INTRAVENOUS CONTINUOUS
Status: DISCONTINUED | OUTPATIENT
Start: 2020-02-19 | End: 2020-02-19 | Stop reason: HOSPADM

## 2020-02-19 RX ADMIN — LIDOCAINE HYDROCHLORIDE 1 ML: 10 INJECTION, SOLUTION EPIDURAL; INFILTRATION; INTRACAUDAL; PERINEURAL at 10:02

## 2020-02-19 RX ADMIN — SODIUM CHLORIDE, SODIUM LACTATE, POTASSIUM CHLORIDE, AND CALCIUM CHLORIDE: .6; .31; .03; .02 INJECTION, SOLUTION INTRAVENOUS at 10:02

## 2020-02-19 RX ADMIN — PROPOFOL 150 MCG/KG/MIN: 10 INJECTION, EMULSION INTRAVENOUS at 11:02

## 2020-02-19 RX ADMIN — PROPOFOL 75 MG: 10 INJECTION, EMULSION INTRAVENOUS at 11:02

## 2020-02-19 RX ADMIN — PROPOFOL 25 MG: 10 INJECTION, EMULSION INTRAVENOUS at 11:02

## 2020-02-19 RX ADMIN — LIDOCAINE HYDROCHLORIDE 100 MG: 20 INJECTION, SOLUTION INTRAVENOUS at 11:02

## 2020-02-19 RX ADMIN — SODIUM CHLORIDE, SODIUM LACTATE, POTASSIUM CHLORIDE, AND CALCIUM CHLORIDE: 600; 310; 30; 20 INJECTION, SOLUTION INTRAVENOUS at 11:02

## 2020-02-19 NOTE — [employer] PATIENT INSTRUCTIONS
Discharge Summary/Instructions after an Endoscopic Procedure  Patient Name: Julee Ballesteros  Patient MRN: [id number]  Patient YOB: 1950 Wednesday, February 19, 2020  Alok Nieves MD  RESTRICTIONS:  During your procedure today, you received medications for sedation.  These   medications may affect your judgment, balance and coordination.  Therefore,   for 24 hours, you have the following restrictions:   - DO NOT drive a car, operate machinery, make legal/financial decisions,   sign important papers or drink alcohol.    ACTIVITY:  Today: no heavy lifting, straining or running due to procedural   sedation/anesthesia.  The following day: return to full activity including work.  DIET:  Eat and drink normally unless instructed otherwise.     TREATMENT FOR COMMON SIDE EFFECTS:  - Mild abdominal pain, nausea, belching, bloating or excessive gas:  rest,   eat lightly and use a heating pad.  - Sore Throat: treat with throat lozenges and/or gargle with warm salt   water.  - Because air was used during the procedure, expelling large amounts of air   from your rectum or belching is normal.  - If a bowel prep was taken, you may not have a bowel movement for 1-3 days.    This is normal.  SYMPTOMS TO WATCH FOR AND REPORT TO YOUR PHYSICIAN:  1. Abdominal pain or bloating, other than gas cramps.  2. Chest pain.  3. Back pain.  4. Signs of infection such as: chills or fever occurring within 24 hours   after the procedure.  5. Rectal bleeding, which would show as bright red, maroon, or black stools.   (A tablespoon of blood from the rectum is not serious, especially if   hemorrhoids are present.)  6. Vomiting.  7. Weakness or dizziness.  GO DIRECTLY TO THE NEAREST EMERGENCY ROOM IF YOU HAVE ANY OF THE FOLLOWING:      Difficulty breathing              Chills and/or fever over 101 F   Persistent vomiting and/or vomiting blood   Severe abdominal pain   Severe chest pain   Black, tarry stools   Bleeding- more than one  tablespoon   Any other symptom or condition that you feel may need urgent attention  Your doctor recommends these additional instructions:  If any biopsies were taken, your doctors clinic will contact you in 1 to 2   weeks with any results.  We are waiting for your pathology results.   Your physician has indicated that a repeat colonoscopy is not recommended   for screening purposes.   You are being discharged to home.  For questions, problems or results please call your physician - Alok Nieves MD at Work:  [phone].  EMERGENCY PHONE NUMBER: [phone], LAB RESULTS: [phone]  IF A COMPLICATION OR EMERGENCY SITUATION ARISES AND YOU ARE UNABLE TO REACH   YOUR PHYSICIAN - GO DIRECTLY TO THE EMERGENCY ROOM.  ___________________________________________  Nurse Signature  ___________________________________________  Patient/Designated Responsible Party Signature  Alok Nieves MD  2/19/2020 11:37:17 AM  This report has been verified and signed electronically.  [employer]

## 2020-02-19 NOTE — ANESTHESIA POSTPROCEDURE EVALUATION
Anesthesia Post Evaluation    Patient: Julee Bee    Procedure(s) Performed: Procedure(s) (LRB):  COLONOSCOPY (N/A)    Final Anesthesia Type: general    Patient location during evaluation: PACU  Patient participation: Yes- Able to Participate  Level of consciousness: awake and alert and oriented  Post-procedure vital signs: reviewed and stable  Pain management: adequate  Airway patency: patent    PONV status at discharge: No PONV  Anesthetic complications: no      Cardiovascular status: blood pressure returned to baseline  Respiratory status: unassisted, spontaneous ventilation and room air  Hydration status: euvolemic  Follow-up not needed.          Vitals Value Taken Time   /62 2/19/2020 11:55 AM   Temp 36.5 °C (97.7 °F) 2/19/2020 11:45 AM   Pulse 80 2/19/2020 11:55 AM   Resp 14 2/19/2020 11:55 AM   SpO2 97 % 2/19/2020 11:55 AM         No case tracking events are documented in the log.      Pain/Denny Score: No data recorded

## 2020-02-19 NOTE — TRANSFER OF CARE
Anesthesia Transfer of Care Note    Patient: Julee Bee    Procedure(s) Performed: Procedure(s) (LRB):  COLONOSCOPY (N/A)    Patient location: PACU    Anesthesia Type: general    Transport from OR: Transported from OR on room air with adequate spontaneous ventilation    Post pain: adequate analgesia    Post assessment: no apparent anesthetic complications    Post vital signs: stable    Level of consciousness: responds to stimulation    Nausea/Vomiting: no nausea/vomiting    Complications: none    Transfer of care protocol was followed      Last vitals:   Visit Vitals  /60 (BP Location: Right arm, Patient Position: Lying)   Pulse 83   Temp 36.7 °C (98.1 °F) (Skin)   Resp 16   Ht 5' (1.524 m)   Wt 84.4 kg (186 lb)   SpO2 96%   Breastfeeding? No   BMI 36.33 kg/m²

## 2020-02-19 NOTE — ANESTHESIA PREPROCEDURE EVALUATION
02/19/2020  Julee Bee is a 69 y.o., female.    Anesthesia Evaluation    I have reviewed the Patient Summary Reports.    I have reviewed the Nursing Notes.   I have reviewed the Medications.     Review of Systems  Anesthesia Hx:  No problems with previous Anesthesia Denies Hx of Anesthetic complications    Social:  Non-Smoker    Cardiovascular:   Denies Hypertension.  Denies MI.  Denies CAD.    Denies CABG/stent.   Denies Angina.    Pulmonary:   Denies COPD.  Denies Asthma.  Denies Recent URI.    Renal/:   Denies Chronic Renal Disease.     Hepatic/GI:   Denies GERD. Denies Liver Disease.    Neurological:   Denies TIA. Denies CVA. Neuromuscular Disease,  Denies Seizures.    Endocrine:   Denies Diabetes. Denies Hypothyroidism.    Psych:   Denies Psychiatric History.          Physical Exam  General:  Well nourished    Airway/Jaw/Neck:  Airway Findings: Mouth Opening: Normal Tongue: Normal  General Airway Assessment: Adult, Good  Mallampati: II  Improves to II with phonation.  TM Distance: 4-6 cm      Dental:  Dental Findings: In tact   Chest/Lungs:  Chest/Lungs Findings: Clear to auscultation, Normal Respiratory Rate     Heart/Vascular:  Heart Findings: Rate: Normal  Rhythm: Regular Rhythm  Sounds: Normal  Heart murmur: negative       Mental Status:  Mental Status Findings:  Cooperative, Alert and Oriented         Anesthesia Plan  Type of Anesthesia, risks & benefits discussed:  Anesthesia Type:  general  Patient's Preference:   Intra-op Monitoring Plan: standard ASA monitors  Intra-op Monitoring Plan Comments:   Post Op Pain Control Plan:   Post Op Pain Control Plan Comments:   Induction:   IV  Beta Blocker:  Patient is not currently on a Beta-Blocker (No further documentation required).       Informed Consent: Patient understands risks and agrees with Anesthesia plan.  Questions answered.  Anesthesia consent signed with patient.  ASA Score: 2     Day of Surgery Review of History & Physical: I have interviewed and examined the patient. I have reviewed the patient's H&P dated:  There are no significant changes.  H&P update referred to the surgeon.  H&P completed by Anesthesiologist.       Ready For Surgery From Anesthesia Perspective.

## 2020-02-19 NOTE — H&P
History & Physical - Short Stay  Gastroenterology      SUBJECTIVE:     Procedure: Colonoscopy    Chief Complaint/Indication for Procedure: Change in Bowel Habits    PTA Medications   Medication Sig    b complex vitamins tablet Take 1 tablet by mouth once daily.    cetirizine (ZYRTEC) 10 MG tablet Take 10 mg by mouth once daily.    co-enzyme Q-10 30 mg capsule Take 30 mg by mouth once daily.     diclofenac sodium 100 mg 24 hr tablet Take 100 mg by mouth once daily.     dicyclomine (BENTYL) 10 MG capsule Take 1 capsule (10 mg total) by mouth 4 (four) times daily before meals and nightly.    doxycycline (PERIOSTAT) 20 MG tablet Take 20 mg by mouth once daily.     gabapentin (NEURONTIN) 100 MG capsule TAKE 1 CAPSULE EVERY MORNING  AND TAKE 2 CAPSULES AT BEDTIME (Patient taking differently: Take 100 mg by mouth every evening. )    pantoprazole (PROTONIX) 40 MG tablet TAKE 1 TABLET EVERY DAY    simvastatin (ZOCOR) 40 MG tablet Take 1 tablet (40 mg total) by mouth every evening.    acyclovir (ZOVIRAX) 800 MG Tab Take 800 mg by mouth once daily.     ketoconazole (NIZORAL) 2 % cream Apply 1 application topically daily as needed.     lisinopril-hydrochlorothiazide (PRINZIDE,ZESTORETIC) 20-25 mg Tab TAKE 1 TABLET EVERY DAY (Patient taking differently: Take 1 tablet by mouth once daily. )       Review of patient's allergies indicates:   Allergen Reactions    Cefdinir Other (See Comments)    Bactrim [sulfamethoxazole-trimethoprim] Rash    Darvon [propoxyphene] Nausea Only    Feldene [piroxicam] Hives    Tramadol Nausea Only        Past Medical History:   Diagnosis Date    Back pain     Colon polyp     GERD (gastroesophageal reflux disease)     H/O bronchitis     H/O: pneumonia     Hearing aid worn     Hyperlipidemia     Hypertension     IBS (irritable bowel syndrome)      Past Surgical History:   Procedure Laterality Date    BREAST BIOPSY Right     neg    BREAST CYST EXCISION Left 1990/ 2005    x2-  neg    CHOLECYSTECTOMY  2014    COLONOSCOPY  2011    repeat in 10    COLONOSCOPY  08/16/2010    Dr He received report; polyps removed and bx negative    FINGER SURGERY      HEEL SPUR EXCISION      HYSTERECTOMY  1978    for prolapse    SURGICAL REMOVAL OF PAUL'S NEUROMA      TOE SURGERY      tendon    TONSILLECTOMY      TRANSFORAMINAL EPIDURAL INJECTION OF STEROID Bilateral 1/15/2020    Procedure: INJECTION, STEROID, EPIDURAL, TRANSFORAMINAL APPROACH/ Lumbar L5;  Surgeon: Rashard Glover MD;  Location[facility];  Service: Pain Management;  Laterality: Bilateral;    UMBILICAL HERNIA REPAIR      UPPER GASTROINTESTINAL ENDOSCOPY  2010    Dr. eH; reflux per pt report     Family History   Problem Relation Age of Onset    Cancer Paternal Grandfather     Stomach cancer Paternal Grandfather     Diabetes Maternal Grandmother     Heart disease Maternal Grandmother     Diabetes Mother     Heart disease Mother     Diabetes Brother     Heart disease Brother     Breast cancer Cousin 60    Breast cancer Maternal Cousin 50    Breast cancer Paternal Cousin 50    Breast cancer Paternal Cousin 70    Ovarian cancer Neg Hx     Colon polyps Neg Hx     Esophageal cancer Neg Hx     Crohn's disease Neg Hx     Ulcerative colitis Neg Hx      Social History     Tobacco Use    Smoking status: Never Smoker    Smokeless tobacco: Never Used   Substance Use Topics    Alcohol use: Yes     Alcohol/week: 0.0 standard drinks     Frequency: 2-4 times a month     Drinks per session: 1 or 2     Binge frequency: Never     Comment: Social     Drug use: No         OBJECTIVE:     Vital Signs (Most Recent)  Temp: 98.1 °F (36.7 °C) (02/19/20 1028)  Pulse: 83 (02/19/20 1028)  Resp: 16 (02/19/20 1028)  BP: 130/60 (02/19/20 1028)  SpO2: 96 % (02/19/20 1028)    Physical Exam:                                                       GENERAL:  Comfortable, in no acute distress.                                 HEENT EXAM:   Nonicteric.  No adenopathy.  Oropharynx is clear.               NECK:  Supple.                                                               LUNGS:  Clear.                                                               CARDIAC:  Regular rate and rhythm.  S1, S2.  No murmur.                      ABDOMEN:  Soft, positive bowel sounds, nontender.  No hepatosplenomegaly or masses.  No rebound or guarding.                                             EXTREMITIES:  No edema.     MENTAL STATUS:  Normal, alert and oriented.      ASSESSMENT/PLAN:     Assessment: Change in Bowel Habits    Plan: Colonoscopy    Anesthesia Plan: General    ASA Grade: ASA 2 - Patient with mild systemic disease with no functional limitations    MALLAMPATI SCORE:  I (soft palate, uvula, fauces, and tonsillar pillars visible)

## 2020-02-19 NOTE — DISCHARGE SUMMARY
Discharge Note  Short Stay      SUMMARY     Admit Date: 2/19/2020    Attending Physician: Alok Nieves MD     Discharge Physician: Alok Nieves MD    Discharge Date: 2/19/2020 11:38 AM    Final Diagnosis: Irregular bowel habits [R19.8]  Diarrhea, unspecified type [R19.7]  Abdominal cramping [R10.9]  History of IBS [Z87.19]    Disposition: HOME OR SELF CARE    Patient Instructions:   Current Discharge Medication List      CONTINUE these medications which have NOT CHANGED    Details   b complex vitamins tablet Take 1 tablet by mouth once daily.      cetirizine (ZYRTEC) 10 MG tablet Take 10 mg by mouth once daily.      co-enzyme Q-10 30 mg capsule Take 30 mg by mouth once daily.       diclofenac sodium 100 mg 24 hr tablet Take 100 mg by mouth once daily.       dicyclomine (BENTYL) 10 MG capsule Take 1 capsule (10 mg total) by mouth 4 (four) times daily before meals and nightly.  Qty: 120 capsule, Refills: 2    Associated Diagnoses: Abdominal cramping      doxycycline (PERIOSTAT) 20 MG tablet Take 20 mg by mouth once daily.       gabapentin (NEURONTIN) 100 MG capsule TAKE 1 CAPSULE EVERY MORNING  AND TAKE 2 CAPSULES AT BEDTIME  Qty: 270 capsule, Refills: 3    Associated Diagnoses: Restless leg syndrome      pantoprazole (PROTONIX) 40 MG tablet TAKE 1 TABLET EVERY DAY  Qty: 90 tablet, Refills: 3    Associated Diagnoses: Gastroesophageal reflux disease without esophagitis      simvastatin (ZOCOR) 40 MG tablet Take 1 tablet (40 mg total) by mouth every evening.  Qty: 90 tablet, Refills: 3    Associated Diagnoses: Mixed hyperlipidemia      acyclovir (ZOVIRAX) 800 MG Tab Take 800 mg by mouth once daily.       ketoconazole (NIZORAL) 2 % cream Apply 1 application topically daily as needed.       lisinopril-hydrochlorothiazide (PRINZIDE,ZESTORETIC) 20-25 mg Tab TAKE 1 TABLET EVERY DAY  Qty: 90 tablet, Refills: 3    Associated Diagnoses: Essential hypertension             Discharge Procedure Orders (must include  Diet, Follow-up, Activity)    Follow Up:  Follow up with PCP as previously scheduled  Resume routine diet.  Activity as tolerated.    No driving day of procedure.

## 2020-03-04 LAB
FINAL PATHOLOGIC DIAGNOSIS: NORMAL
GROSS: NORMAL

## 2020-06-17 ENCOUNTER — OFFICE VISIT (OUTPATIENT)
Dept: PODIATRY | Facility: CLINIC | Age: 70
End: 2020-06-17
Payer: MEDICARE

## 2020-06-17 VITALS
DIASTOLIC BLOOD PRESSURE: 71 MMHG | WEIGHT: 187 LBS | SYSTOLIC BLOOD PRESSURE: 134 MMHG | TEMPERATURE: 98 F | HEART RATE: 78 BPM | BODY MASS INDEX: 36.71 KG/M2 | HEIGHT: 60 IN

## 2020-06-17 DIAGNOSIS — B35.1 ONYCHOMYCOSIS DUE TO DERMATOPHYTE: ICD-10-CM

## 2020-06-17 DIAGNOSIS — E11.51 TYPE II DIABETES MELLITUS WITH PERIPHERAL CIRCULATORY DISORDER: Primary | ICD-10-CM

## 2020-06-17 PROCEDURE — 1126F AMNT PAIN NOTED NONE PRSNT: CPT | Mod: S$GLB,,, | Performed by: PODIATRIST

## 2020-06-17 PROCEDURE — 3078F PR MOST RECENT DIASTOLIC BLOOD PRESSURE < 80 MM HG: ICD-10-PCS | Mod: CPTII,S$GLB,, | Performed by: PODIATRIST

## 2020-06-17 PROCEDURE — 1159F MED LIST DOCD IN RCRD: CPT | Mod: S$GLB,,, | Performed by: PODIATRIST

## 2020-06-17 PROCEDURE — 3075F SYST BP GE 130 - 139MM HG: CPT | Mod: CPTII,S$GLB,, | Performed by: PODIATRIST

## 2020-06-17 PROCEDURE — 3008F PR BODY MASS INDEX (BMI) DOCUMENTED: ICD-10-PCS | Mod: CPTII,S$GLB,, | Performed by: PODIATRIST

## 2020-06-17 PROCEDURE — 99202 OFFICE O/P NEW SF 15 MIN: CPT | Mod: S$GLB,,, | Performed by: PODIATRIST

## 2020-06-17 PROCEDURE — 1159F PR MEDICATION LIST DOCUMENTED IN MEDICAL RECORD: ICD-10-PCS | Mod: S$GLB,,, | Performed by: PODIATRIST

## 2020-06-17 PROCEDURE — 3008F BODY MASS INDEX DOCD: CPT | Mod: CPTII,S$GLB,, | Performed by: PODIATRIST

## 2020-06-17 PROCEDURE — 1126F PR PAIN SEVERITY QUANTIFIED, NO PAIN PRESENT: ICD-10-PCS | Mod: S$GLB,,, | Performed by: PODIATRIST

## 2020-06-17 PROCEDURE — 3078F DIAST BP <80 MM HG: CPT | Mod: CPTII,S$GLB,, | Performed by: PODIATRIST

## 2020-06-17 PROCEDURE — 1101F PR PT FALLS ASSESS DOC 0-1 FALLS W/OUT INJ PAST YR: ICD-10-PCS | Mod: CPTII,S$GLB,, | Performed by: PODIATRIST

## 2020-06-17 PROCEDURE — 3075F PR MOST RECENT SYSTOLIC BLOOD PRESS GE 130-139MM HG: ICD-10-PCS | Mod: CPTII,S$GLB,, | Performed by: PODIATRIST

## 2020-06-17 PROCEDURE — 3051F HG A1C>EQUAL 7.0%<8.0%: CPT | Mod: CPTII,S$GLB,, | Performed by: PODIATRIST

## 2020-06-17 PROCEDURE — 3051F PR MOST RECENT HEMOGLOBIN A1C LEVEL 7.0 - < 8.0%: ICD-10-PCS | Mod: CPTII,S$GLB,, | Performed by: PODIATRIST

## 2020-06-17 PROCEDURE — 99999 PR PBB SHADOW E&M-EST. PATIENT-LVL IV: ICD-10-PCS | Mod: PBBFAC,,, | Performed by: PODIATRIST

## 2020-06-17 PROCEDURE — 99999 PR PBB SHADOW E&M-EST. PATIENT-LVL IV: CPT | Mod: PBBFAC,,, | Performed by: PODIATRIST

## 2020-06-17 PROCEDURE — 99202 PR OFFICE/OUTPT VISIT, NEW, LEVL II, 15-29 MIN: ICD-10-PCS | Mod: S$GLB,,, | Performed by: PODIATRIST

## 2020-06-17 PROCEDURE — 1101F PT FALLS ASSESS-DOCD LE1/YR: CPT | Mod: CPTII,S$GLB,, | Performed by: PODIATRIST

## 2020-06-22 ENCOUNTER — TELEPHONE (OUTPATIENT)
Dept: GASTROENTEROLOGY | Facility: CLINIC | Age: 70
End: 2020-06-22

## 2020-06-22 NOTE — TELEPHONE ENCOUNTER
Left message to set up virtual appointment for patient, left phone number so patient can do that at .

## 2020-06-29 NOTE — PROGRESS NOTES
Subjective:      Patient ID: Julee Bee is a 69 y.o. female.    Chief Complaint: Diabetes Mellitus (Dr. Denton 6/12/20 A1C 7.0 pm 6/11/20) and Diabetic Foot Exam (nail care )    Julee is a 69 y.o. female who presents to the clinic upon referral from Dr. Purcell  for evaluation and treatment of diabetic feet. Julee has a past medical history of Back pain, Colon polyp, GERD (gastroesophageal reflux disease), H/O bronchitis, H/O: pneumonia, Hearing aid worn, Hyperlipidemia, Hypertension, and IBS (irritable bowel syndrome). Patient relates no major problem with feet. Only complaints today consist of long thick nails needing help trimming.    PCP: Devin Denton MD    Date Last Seen by PCP: 6/12/20    Current shoe gear: Casual shoes    Hemoglobin A1C   Date Value Ref Range Status   06/11/2020 7.0 (H) 0.0 - 5.6 % Final     Comment:     Reference Interval:  5.0 - 5.6 Normal   5.7 - 6.4 High Risk   > 6.5 Diabetic    Hgb A1c results are standardized based on the (NGSP) National   Glycohemoglobin Standardization Program.    Hemoglobin A1C levels are related to mean serum/plasma glucose   during the preceding 2-3 months.        03/03/2020 7.5 (H) 0.0 - 5.6 % Final     Comment:     Reference Interval:  5.0 - 5.6 Normal   5.7 - 6.4 High Risk   > 6.5 Diabetic    Hgb A1c results are standardized based on the (NGSP) National   Glycohemoglobin Standardization Program.    Hemoglobin A1C levels are related to mean serum/plasma glucose   during the preceding 2-3 months.                Review of Systems   Constitution: Negative for chills and fever.   Cardiovascular: Positive for leg swelling. Negative for claudication.   Respiratory: Negative for shortness of breath.    Skin: Positive for nail changes. Negative for itching and rash.   Musculoskeletal: Positive for arthritis. Negative for muscle cramps, muscle weakness and myalgias.   Gastrointestinal: Negative for nausea and vomiting.   Neurological: Positive for paresthesias.  Negative for focal weakness, loss of balance and numbness.           Objective:      Physical Exam  Constitutional:       General: She is not in acute distress.     Appearance: She is well-developed. She is not diaphoretic.   Cardiovascular:      Pulses:           Dorsalis pedis pulses are 2+ on the right side and 2+ on the left side.        Posterior tibial pulses are 1+ on the right side and 1+ on the left side.      Comments: < 3 sec capillary refill time to toes 1-5 bilateral. Toes and feet are warm to touch proximally with normal distal cooling b/l. There is diminished hair growth on the feet and toes b/l. There is 1+ edema b/l with varicosities present b/l.     Musculoskeletal:      Comments: Equinus noted b/l ankles with < 10 deg DF noted. MMT 5/5 in DF/PF/Inv/Ev resistance with no reproduction of pain in any direction. Passive range of motion of ankle and pedal joints is painless b/l.     Feet:      Right foot:      Protective Sensation: 10 sites tested. 10 sites sensed.      Left foot:      Protective Sensation: 10 sites tested. 10 sites sensed.   Skin:     General: Skin is warm and dry.      Coloration: Skin is not pale.      Findings: No abrasion, bruising, burn, ecchymosis, erythema, laceration, lesion, petechiae or rash.      Nails: There is no clubbing.        Comments: Skin thin and atrophic    Nails 1-5 bilateral are thick 3-4 mm, long 3-6 mm, and discolored with subungual debris     Neurological:      Mental Status: She is alert and oriented to person, place, and time.      Sensory: No sensory deficit.      Motor: No tremor, atrophy or abnormal muscle tone.      Comments: Negative tinel sign bilateral.   Psychiatric:         Behavior: Behavior normal.               Assessment:       Encounter Diagnoses   Name Primary?    Type II diabetes mellitus with peripheral circulatory disorder Yes    Onychomycosis due to dermatophyte          Plan:       Julee was seen today for diabetes mellitus and  diabetic foot exam.    Diagnoses and all orders for this visit:    Type II diabetes mellitus with peripheral circulatory disorder    Onychomycosis due to dermatophyte      I counseled the patient on her conditions, their implications and medical management.    Shoe inspection. Diabetic Foot Education. Patient reminded of the importance of good nutrition and blood sugar control to help prevent podiatric complications of diabetes. Patient instructed on proper foot hygeine. We discussed wearing proper shoe gear, daily foot inspections, never walking without protective shoe gear, never putting sharp instruments to feet    - With patient's permission out of courtesy, nails were aggressively reduced and debrided x 10 to their soft tissue attachment mechanically and with electric , removing all offending nail and debris. Patient relates relief following the procedure. She will continue to monitor the areas daily, inspect her feet, wear protective shoe gear when ambulatory, moisturizer to maintain skin integrity.    Return 1 year diabetic foot check

## 2020-07-23 ENCOUNTER — OFFICE VISIT (OUTPATIENT)
Dept: URGENT CARE | Facility: CLINIC | Age: 70
End: 2020-07-23
Payer: MEDICARE

## 2020-07-23 VITALS
DIASTOLIC BLOOD PRESSURE: 98 MMHG | SYSTOLIC BLOOD PRESSURE: 160 MMHG | OXYGEN SATURATION: 95 % | HEART RATE: 93 BPM | TEMPERATURE: 97 F

## 2020-07-23 DIAGNOSIS — J34.89 SINUS PRESSURE: ICD-10-CM

## 2020-07-23 DIAGNOSIS — R03.0 ELEVATED BLOOD PRESSURE READING: ICD-10-CM

## 2020-07-23 DIAGNOSIS — R51.9 HEADACHE, UNSPECIFIED HEADACHE TYPE: Primary | ICD-10-CM

## 2020-07-23 DIAGNOSIS — R11.0 NAUSEA WITHOUT VOMITING: ICD-10-CM

## 2020-07-23 PROCEDURE — 99214 OFFICE O/P EST MOD 30 MIN: CPT | Mod: S$GLB,,, | Performed by: NURSE PRACTITIONER

## 2020-07-23 PROCEDURE — 99214 PR OFFICE/OUTPT VISIT, EST, LEVL IV, 30-39 MIN: ICD-10-PCS | Mod: S$GLB,,, | Performed by: NURSE PRACTITIONER

## 2020-07-23 PROCEDURE — U0003 INFECTIOUS AGENT DETECTION BY NUCLEIC ACID (DNA OR RNA); SEVERE ACUTE RESPIRATORY SYNDROME CORONAVIRUS 2 (SARS-COV-2) (CORONAVIRUS DISEASE [COVID-19]), AMPLIFIED PROBE TECHNIQUE, MAKING USE OF HIGH THROUGHPUT TECHNOLOGIES AS DESCRIBED BY CMS-2020-01-R: HCPCS

## 2020-07-23 RX ORDER — BUTALBITAL, ACETAMINOPHEN AND CAFFEINE 50; 325; 40 MG/1; MG/1; MG/1
1 TABLET ORAL EVERY 6 HOURS PRN
Qty: 16 TABLET | Refills: 0 | Status: SHIPPED | OUTPATIENT
Start: 2020-07-23 | End: 2020-07-30

## 2020-07-23 RX ORDER — ONDANSETRON 4 MG/1
4 TABLET, FILM COATED ORAL EVERY 12 HOURS PRN
Qty: 14 TABLET | Refills: 0 | Status: SHIPPED | OUTPATIENT
Start: 2020-07-23 | End: 2020-07-30

## 2020-07-23 RX ORDER — AZELASTINE 1 MG/ML
1 SPRAY, METERED NASAL 2 TIMES DAILY
Qty: 30 ML | Refills: 0 | Status: SHIPPED | OUTPATIENT
Start: 2020-07-23 | End: 2020-12-03 | Stop reason: ALTCHOICE

## 2020-07-23 NOTE — PROGRESS NOTES
Subjective:       Patient ID: Julee Bee is a 69 y.o. female.    Vitals:  temperature is 97.2 °F (36.2 °C). Her pulse is 93. Her oxygen saturation is 95%.     Chief Complaint: Headache    Has had a headache since last weekend and it wont go away, she has tried tylenol, Advil, Asprin, and a sinus headache    Headache   This is a new problem. The current episode started in the past 7 days. The problem occurs constantly. The problem has been gradually worsening. The pain is located in the temporal and occipital region. The pain does not radiate. The pain quality is not similar to prior headaches. The quality of the pain is described as dull. The pain is at a severity of 3/10. The pain is mild. Pertinent negatives include no abdominal pain, abnormal behavior, anorexia, back pain, blurred vision, coughing, dizziness, drainage, ear pain, eye pain, eye redness, eye watering, facial sweating, fever, hearing loss, insomnia, loss of balance, muscle aches, nausea, neck pain, numbness, phonophobia, photophobia, scalp tenderness, seizures, sinus pressure, sore throat, swollen glands, tingling, tinnitus, visual change, vomiting, weakness or weight loss. Exacerbated by: laying down. She has tried acetaminophen and NSAIDs for the symptoms. The treatment provided mild relief. There is no history of cancer, cluster headaches, hypertension, immunosuppression, migraine headaches, migraines in the family, obesity, pseudotumor cerebri, recent head traumas, sinus disease or TMJ.       Constitution: Negative for fever.   HENT: Negative for ear pain, tinnitus, hearing loss, sinus pressure and sore throat.    Neck: Negative for neck pain.   Eyes: Negative for eye pain, eye redness, photophobia and blurred vision.   Respiratory: Negative for cough.    Gastrointestinal: Negative for abdominal pain, nausea and vomiting.   Musculoskeletal: Negative for back pain.   Neurological: Positive for headaches. Negative for dizziness, loss of  balance, history of migraines, numbness and seizures.   Psychiatric/Behavioral: The patient does not have insomnia.        Objective:      Physical Exam      Assessment:       1. Headache, unspecified headache type        Plan:         Headache, unspecified headache type  -     COVID-19 Routine Screening

## 2020-07-23 NOTE — PATIENT INSTRUCTIONS
Always follow your healthcare professional's instructions.    You have received urgent care diagnosis and treatment and you may be released before all of your medical problems are known or treated. Unless you have been given a referral, you (the patient), will arrange for follow-up care as instructed.     If you have been given a referral, [facility] will contact you (their phone number is [phone]). If they do NOT call you by the end of the business day, please call them the following day.      Discharge Instructions for High Blood Pressure (Hypertension)  You have been diagnosed with high blood pressure (also called hypertension). This means the force of blood against your artery walls is too strong. It also means your heart is working hard to move blood. High blood pressure usually has no symptoms, but over time, it can damage your heart, blood vessels, eyes, kidneys, and other organs. With help from your doctor, you can manage your blood pressure and protect your health.  Taking medicine  · Learn to take your own blood pressure. Keep a record of your results. Ask your doctor which readings mean that you need medical attention.  · Take your blood pressure medicine exactly as directed. Dont skip doses. Missing doses can cause your blood pressure to get out of control.  · If you do miss a dose (or doses) check with your healthcare provider about what to do.  · Avoid medicine that contain heart stimulants, including over-the-counter drugs. Check for warnings about high blood pressure on the label. Ask the pharmacist before purchasing something you haven't used before  · Check with your doctor or pharmacist before taking a decongestant. Some decongestants can worsen high blood pressure.  Lifestyle changes  · Maintain a healthy weight. Get help to lose any extra pounds.  · Cut back on salt.  ¨ Limit canned, dried, packaged, and fast foods.  ¨ Dont add salt to your food at the table.  ¨ Season foods with herbs  "instead of salt when you cook.  ¨ Request no added salt when you go to a restaurant.  ¨ The American Heart Associations (AHA) "ideal" sodium intake recommendation is 1,500 milligrams per day.  However, since American's eat so much salt, the AHA says a positive change can occur by cutting back to even 2,400 milligrams of sodium a day.   · Follow the DASH (Dietary Approaches to Stop Hypertension) eating plan. This plan recommends vegetables, fruits, whole gains, and other heart healthy foods.  · Begin an exercise program. Ask your doctor how to get started. The American Heart Association recommends aerobic exercise 3 to 4 times a week for an average of 40 minutes at a time, with your doctor's approval. Simple activities like walking or gardening can help.  · Break the smoking habit. Enroll in a stop-smoking program to improve your chances of success. Ask your healthcare provider about programs and medicines to help you stop smoking.  · Limit drinks that contain caffeine (coffee, black or green tea, cola) to 2 per day.  · Never take stimulants such as amphetamines or cocaine; these drugs can be deadly for someone with high blood pressure.  · Control your stress. Learn stress-management techniques.  · Limit alcohol to no more than 1 drink a day for women and 2 drinks a day for men.  Follow-up care  Make a follow-up appointment as directed by our staff.     When to seek medical care  Call your doctor immediately or seek emergency care if you have any of the following:  · Chest pain or shortness of breath (call 911)  · Moderate to severe headache  · Weakness in the muscles of your face, arms, or legs  · Trouble speaking  · Extreme drowsiness  · Confusion  · Fainting or dizziness  · Pulsating or rushing sound in your ears  · Unexplained nosebleed  · Weakness, tingling, or numbness of your face, arms, or legs  · Change in vision  · Blood pressure measured at home that is greater than 180/110   Date Last Reviewed: " "4/27/2016  © 2939-6007 [employer]. [address] [place] [address], [place] [address]. All rights reserved. This information is not intended as a substitute for professional medical care. Always follow your healthcare professional's instructions.    Your provider discussed your plan of care with you during your physical exam. It was reviewed once more by the provider when giving you an after visit summary. If the patient is a minor, the discharge instructions were discussed with an adult and that adult acknowledge their understanding of the provider's teaching.    Headache, Unspecified    A number of things can cause headaches. The cause of your headache isnt clear. Headaches can be a sign of a serious illness. If your headache persist, you need to go to the emergency room.    You could have a tension headache or a migraine headache.  Stress can cause a tension headache. This can happen if you tense the muscles of your shoulders, neck, and scalp without knowing it. If this stress lasts long enough, you may develop a tension headache.  It is not clear why migraines occur, but certain things called" triggers" can raise the risk of having a migraine attack. Migraine triggers may include emotional stress or depression, or by hormone changes during the menstrual cycle. Other triggers include birth control pills and other medicines, alcohol or caffeine, foods with tyramine (such as aged cheese, wine), eyestrain, weather changes, missed meals, and lack of sleep or oversleeping.  Other causes of headache include:  · Viral illness with high fever  · Head injury with concussion  · Sinus, ear, or throat infection  · Dental pain and jaw joint (TMJ) pain  More serious but less common causes of headache include stroke, brain hemorrhage, brain tumor, meningitis, and encephalitis.    Home care  Follow these tips when taking care of yourself at home:  · Dont drive yourself home if you were given pain medicine for your " "headache. Instead, have someone else drive you home. Try to sleep when you get home. You should feel much better when you wake up.  · Apply heat to the back of your neck to ease a neck muscle spasm. Take care of a migraine headache by putting an ice pack on your forehead or at the base of your skull.  · If you have nausea or vomiting, eat a light diet until your headache eases.  · If you have a migraine headache, use sunglasses when in the daylight or around bright indoor lighting until your symptoms get better. Bright glaring light can make this type of headache worse.  Follow-up care  Follow up with your healthcare provider, or as advised. Talk with your provider if you have frequent headaches. He or she can help figure out a treatment plan. By knowing the earliest signs of headache, and starting treatment right away, you may be able to stop the pain yourself.  When to seek medical advice  Call your healthcare provider right away if any of these occur:  · Your head pain suddenly gets worse after sexual intercourse or strenuous activity  · Your head pain doesnt get better within 24 hours  · You arent able to keep liquids down (repeated vomiting)  · Fever of 100.4ºF (38ºC) or higher, or as directed by your healthcare provider  · Stiff neck  · Extreme drowsiness, confusion, or fainting  · Dizziness or dizziness with spinning sensation (vertigo)  · Weakness in an arm or leg or one side of your face  · You have trouble talking or seeing  Date Last Reviewed: 8/1/2016  © 5899-3201 [employer]. [address] [place] [address], [place] [address]. All rights reserved. This information is not intended as a substitute for professional medical care. Always follow your healthcare professional's instructions.    Your symptoms are consistent with COVID-19. There is no current "cure" for COVID-19; rather, like the flu, treatment is geared at symptom management.    COVID-19     Viral infections are spread through the air in " droplets when someone who has a virus breaths, coughs, sneezes, laughs, or talks.     You have been tested for COVID-19 in this clinic today.     Guidelines for self-quarantine:    · Stay home: People who are mildly ill with COVID-19 are able to recover at home. Do not leave, except to get medical care. Do not visit public areas.  · Call ahead: If you have a medical appointment, call your doctors office or emergency department and tell them you may have COVID-19. This will help the office protect themselves and other patients. Let them know how long your symptoms have been present and that you did NOT have a positive test result for the flu.  · Avoid public transportation: Do NOT use public transportation, ride-sharing, taxis, or flying.  · Stay away from others: As much as possible, you should keep a distance of at least 6 feet from your self and others. Stay in a specific sick room and away from other people in your home. Use a separate bathroom, if available.  · When you must go out (for healthcare purposes), you should wear a facemask.  · Wash hands: Wash your hands often with soap and water for at least 20 seconds. This is especially important after blowing your nose, coughing, or sneezing; going to the bathroom; and before eating or preparing food.  Hand : If soap and water are not available, use an alcohol-based hand  with at least 60% alcohol, covering all surfaces of your hands and rubbing them together until they feel dry.  Soap and water: Soap and water are the best option, especially if hands are visibly dirty.  · Avoid touching: Avoid touching your eyes, nose, and mouth with unwashed hands.  · You can discontinue your self-quarantine when 14 days have elapse AND you have NOT had fever for at least 72 hours (that is three full days of no fever without the use medicine that reduces fevers) AND other symptoms have improved (for example, when your cough or shortness of breath have  improved).    How is COVID-19 treated?  Not all patients with COVID-19 will require medical supportive care. Clinical management for hospitalized patients with COVID-19 is focused on supportive care of complications, including advanced organ support for respiratory failure, septic shock, and multi-organ failure. There are currently no antiviral drugs licensed by the [place] Food and Drug Administration (FDA) to treat COVID-19.     Follow the Center for Disease Control's (CDC) recommendations for your safety and the safety of others! From the CDC:    When you can be around others (end home isolation) depends on different factors for different situations.    Find CDCs recommendations for your situation below.    I think or know I had COVID-19, and I had symptoms.  You can be with others after 3 days with no fever and respiratory symptoms have improved (e.g. cough, shortness of breath) and 10 days since symptoms first appeared.    Depending on your healthcare providers advice and availability of testing, you might get tested to see if you still have COVID-19. If you will be tested, you can be around others when you have no fever, respiratory symptoms have improved, and you receive two negative test results in a row, at least 24 hours apart.    I tested positive for COVID-19 but had no symptoms.  If you continue to have no symptoms, you can be with others after:  10 days have passed since test.  Depending on your healthcare providers advice and availability of testing, you might get tested to see if you still have COVID-19. If you will be tested, you can be around others after you receive two negative test results in a row, at least 24 hours apart.    If you develop symptoms after testing positive, follow the guidance above for I think or know I had COVID, and I had symptoms.    I have a weakened immune system (immunocompromised) due to a health condition or medication. When can I be around others?  People with  conditions that weaken their immune system might need to stay home longer than 10 days. Talk to your healthcare provider for more information. If testing is available in your community, it may be recommended by your healthcare provider.  You can be with others after you receive two negative test results in a row, at least 24 hours apart.    For Anyone Who Has Been Around a Person with COVID-19 it is important to remember that anyone who has close contact with someone with COVID-19 should stay home for 14 days after exposure based on the time it takes to develop illness.    For more information go to: https://www.cdc.gov/coronavirus/2019-ncov/if-you-are-sick/end-home-isolation.html    Your provider discussed your plan of care with you during your physical exam. It was reviewed once more by the provider when giving you an after visit summary. If the patient is a minor, the discharge instructions were discussed with an adult and that adult acknowledge their understanding of the provider's teaching.

## 2020-07-23 NOTE — PROGRESS NOTES
Subjective:       Patient ID: Julee Bee is a 69 y.o. female.    Vitals:  temperature is 97.2 °F (36.2 °C). Her blood pressure is 160/98 (abnormal) and her pulse is 93. Her oxygen saturation is 95%.     Chief Complaint: Headache    HPI  ROS    Objective:      Physical Exam   Constitutional: She is oriented to person, place, and time. She appears well-developed. She is cooperative.  Non-toxic appearance. She does not appear ill. No distress.   HENT:   Head: Normocephalic and atraumatic.   Ears:   Right Ear: Hearing, tympanic membrane, external ear and ear canal normal.   Left Ear: Hearing, tympanic membrane, external ear and ear canal normal.   Nose: No mucosal edema, rhinorrhea or nasal deformity. No epistaxis. Right sinus exhibits frontal sinus tenderness. Right sinus exhibits no maxillary sinus tenderness. Left sinus exhibits frontal sinus tenderness. Left sinus exhibits no maxillary sinus tenderness.   Mouth/Throat: Uvula is midline, oropharynx is clear and moist and mucous membranes are normal. No trismus in the jaw. Normal dentition. No uvula swelling. No posterior oropharyngeal erythema.   Eyes: Conjunctivae and lids are normal. Right eye exhibits no discharge. Left eye exhibits no discharge. No scleral icterus.   Neck: Trachea normal, normal range of motion, full passive range of motion without pain and phonation normal. Neck supple.   Cardiovascular: Normal rate, regular rhythm, normal heart sounds and normal pulses.   Pulmonary/Chest: Effort normal and breath sounds normal. No respiratory distress.   Abdominal: Soft. Normal appearance and bowel sounds are normal. She exhibits no distension, no pulsatile midline mass and no mass. There is no abdominal tenderness.   Musculoskeletal: Normal range of motion.         General: No deformity.   Neurological: She is alert and oriented to person, place, and time. She exhibits normal muscle tone. Coordination normal.   Skin: Skin is warm, dry, intact, not  diaphoretic and not pale. Psychiatric: Her speech is normal and behavior is normal. Judgment and thought content normal.   Nursing note and vitals reviewed.        Assessment:       1. Headache, unspecified headache type    2. Elevated blood pressure reading    3. Sinus pressure    4. Nausea without vomiting        Plan:         Headache, unspecified headache type  -     COVID-19 Routine Screening  -     butalbital-acetaminophen-caffeine -40 mg (FIORICET, ESGIC) -40 mg per tablet; Take 1 tablet by mouth every 6 (six) hours as needed for Pain.  Dispense: 16 tablet; Refill: 0    Elevated blood pressure reading    Sinus pressure  -     azelastine (ASTELIN) 137 mcg (0.1 %) nasal spray; 1 spray (137 mcg total) by Nasal route 2 (two) times daily. for 14 days  Dispense: 30 mL; Refill: 0    Nausea without vomiting  -     ondansetron (ZOFRAN) 4 MG tablet; Take 1 tablet (4 mg total) by mouth every 12 (twelve) hours as needed for Nausea.  Dispense: 14 tablet; Refill: 0          Patient Instructions       Always follow your healthcare professional's instructions.    You have received urgent care diagnosis and treatment and you may be released before all of your medical problems are known or treated. Unless you have been given a referral, you (the patient), will arrange for follow-up care as instructed.     If you have been given a referral, [facility] will contact you (their phone number is [phone]). If they do NOT call you by the end of the business day, please call them the following day.      Discharge Instructions for High Blood Pressure (Hypertension)  You have been diagnosed with high blood pressure (also called hypertension). This means the force of blood against your artery walls is too strong. It also means your heart is working hard to move blood. High blood pressure usually has no symptoms, but over time, it can damage your heart, blood vessels, eyes, kidneys, and other organs. With help from your  "doctor, you can manage your blood pressure and protect your health.  Taking medicine  · Learn to take your own blood pressure. Keep a record of your results. Ask your doctor which readings mean that you need medical attention.  · Take your blood pressure medicine exactly as directed. Dont skip doses. Missing doses can cause your blood pressure to get out of control.  · If you do miss a dose (or doses) check with your healthcare provider about what to do.  · Avoid medicine that contain heart stimulants, including over-the-counter drugs. Check for warnings about high blood pressure on the label. Ask the pharmacist before purchasing something you haven't used before  · Check with your doctor or pharmacist before taking a decongestant. Some decongestants can worsen high blood pressure.  Lifestyle changes  · Maintain a healthy weight. Get help to lose any extra pounds.  · Cut back on salt.  ¨ Limit canned, dried, packaged, and fast foods.  ¨ Dont add salt to your food at the table.  ¨ Season foods with herbs instead of salt when you cook.  ¨ Request no added salt when you go to a restaurant.  ¨ The American Heart Associations (AHA) "ideal" sodium intake recommendation is 1,500 milligrams per day.  However, since American's eat so much salt, the AHA says a positive change can occur by cutting back to even 2,400 milligrams of sodium a day.   · Follow the DASH (Dietary Approaches to Stop Hypertension) eating plan. This plan recommends vegetables, fruits, whole gains, and other heart healthy foods.  · Begin an exercise program. Ask your doctor how to get started. The American Heart Association recommends aerobic exercise 3 to 4 times a week for an average of 40 minutes at a time, with your doctor's approval. Simple activities like walking or gardening can help.  · Break the smoking habit. Enroll in a stop-smoking program to improve your chances of success. Ask your healthcare provider about programs and medicines to help " you stop smoking.  · Limit drinks that contain caffeine (coffee, black or green tea, cola) to 2 per day.  · Never take stimulants such as amphetamines or cocaine; these drugs can be deadly for someone with high blood pressure.  · Control your stress. Learn stress-management techniques.  · Limit alcohol to no more than 1 drink a day for women and 2 drinks a day for men.  Follow-up care  Make a follow-up appointment as directed by our staff.     When to seek medical care  Call your doctor immediately or seek emergency care if you have any of the following:  · Chest pain or shortness of breath (call 911)  · Moderate to severe headache  · Weakness in the muscles of your face, arms, or legs  · Trouble speaking  · Extreme drowsiness  · Confusion  · Fainting or dizziness  · Pulsating or rushing sound in your ears  · Unexplained nosebleed  · Weakness, tingling, or numbness of your face, arms, or legs  · Change in vision  · Blood pressure measured at home that is greater than 180/110   Date Last Reviewed: 4/27/2016  © 0685-7721 [email or web address]. [address] [place] [address]. All rights reserved. This information is not intended as a substitute for professional medical care. Always follow your healthcare professional's instructions.    Your provider discussed your plan of care with you during your physical exam. It was reviewed once more by the provider when giving you an after visit summary. If the patient is a minor, the discharge instructions were discussed with an adult and that adult acknowledge their understanding of the provider's teaching.    Headache, Unspecified    A number of things can cause headaches. The cause of your headache isnt clear. Headaches can be a sign of a serious illness. If your headache persist, you need to go to the emergency room.    You could have a tension headache or a migraine headache.  Stress can cause a tension headache. This can happen if you tense the muscles of  "your shoulders, neck, and scalp without knowing it. If this stress lasts long enough, you may develop a tension headache.  It is not clear why migraines occur, but certain things called" triggers" can raise the risk of having a migraine attack. Migraine triggers may include emotional stress or depression, or by hormone changes during the menstrual cycle. Other triggers include birth control pills and other medicines, alcohol or caffeine, foods with tyramine (such as aged cheese, wine), eyestrain, weather changes, missed meals, and lack of sleep or oversleeping.  Other causes of headache include:  · Viral illness with high fever  · Head injury with concussion  · Sinus, ear, or throat infection  · Dental pain and jaw joint (TMJ) pain  More serious but less common causes of headache include stroke, brain hemorrhage, brain tumor, meningitis, and encephalitis.    Home care  Follow these tips when taking care of yourself at home:  · Dont drive yourself home if you were given pain medicine for your headache. Instead, have someone else drive you home. Try to sleep when you get home. You should feel much better when you wake up.  · Apply heat to the back of your neck to ease a neck muscle spasm. Take care of a migraine headache by putting an ice pack on your forehead or at the base of your skull.  · If you have nausea or vomiting, eat a light diet until your headache eases.  · If you have a migraine headache, use sunglasses when in the daylight or around bright indoor lighting until your symptoms get better. Bright glaring light can make this type of headache worse.  Follow-up care  Follow up with your healthcare provider, or as advised. Talk with your provider if you have frequent headaches. He or she can help figure out a treatment plan. By knowing the earliest signs of headache, and starting treatment right away, you may be able to stop the pain yourself.  When to seek medical advice  Call your healthcare provider right " "away if any of these occur:  · Your head pain suddenly gets worse after sexual intercourse or strenuous activity  · Your head pain doesnt get better within 24 hours  · You arent able to keep liquids down (repeated vomiting)  · Fever of 100.4ºF (38ºC) or higher, or as directed by your healthcare provider  · Stiff neck  · Extreme drowsiness, confusion, or fainting  · Dizziness or dizziness with spinning sensation (vertigo)  · Weakness in an arm or leg or one side of your face  · You have trouble talking or seeing  Date Last Reviewed: 8/1/2016  © 8129-0682 [employer]. [address] [place] [address], [place] [address]. All rights reserved. This information is not intended as a substitute for professional medical care. Always follow your healthcare professional's instructions.    Your symptoms are consistent with COVID-19. There is no current "cure" for COVID-19; rather, like the flu, treatment is geared at symptom management.    COVID-19     Viral infections are spread through the air in droplets when someone who has a virus breaths, coughs, sneezes, laughs, or talks.     You have been tested for COVID-19 in this clinic today.     Guidelines for self-quarantine:    · Stay home: People who are mildly ill with COVID-19 are able to recover at home. Do not leave, except to get medical care. Do not visit public areas.  · Call ahead: If you have a medical appointment, call your doctors office or emergency department and tell them you may have COVID-19. This will help the office protect themselves and other patients. Let them know how long your symptoms have been present and that you did NOT have a positive test result for the flu.  · Avoid public transportation: Do NOT use public transportation, ride-sharing, taxis, or flying.  · Stay away from others: As much as possible, you should keep a distance of at least 6 feet from your self and others. Stay in a specific sick room and away from other people in your home. " Use a separate bathroom, if available.  · When you must go out (for healthcare purposes), you should wear a facemask.  · Wash hands: Wash your hands often with soap and water for at least 20 seconds. This is especially important after blowing your nose, coughing, or sneezing; going to the bathroom; and before eating or preparing food.  Hand : If soap and water are not available, use an alcohol-based hand  with at least 60% alcohol, covering all surfaces of your hands and rubbing them together until they feel dry.  Soap and water: Soap and water are the best option, especially if hands are visibly dirty.  · Avoid touching: Avoid touching your eyes, nose, and mouth with unwashed hands.  · You can discontinue your self-quarantine when 14 days have elapse AND you have NOT had fever for at least 72 hours (that is three full days of no fever without the use medicine that reduces fevers) AND other symptoms have improved (for example, when your cough or shortness of breath have improved).    How is COVID-19 treated?  Not all patients with COVID-19 will require medical supportive care. Clinical management for hospitalized patients with COVID-19 is focused on supportive care of complications, including advanced organ support for respiratory failure, septic shock, and multi-organ failure. There are currently no antiviral drugs licensed by the U[place] Food and Drug Administration (FDA) to treat COVID-19.     Follow the Center for Disease Control's (CDC) recommendations for your safety and the safety of others! From the CDC:    When you can be around others (end home isolation) depends on different factors for different situations.    Find CDCs recommendations for your situation below.    I think or know I had COVID-19, and I had symptoms.  You can be with others after 3 days with no fever and respiratory symptoms have improved (e.g. cough, shortness of breath) and 10 days since symptoms first  appeared.    Depending on your healthcare providers advice and availability of testing, you might get tested to see if you still have COVID-19. If you will be tested, you can be around others when you have no fever, respiratory symptoms have improved, and you receive two negative test results in a row, at least 24 hours apart.    I tested positive for COVID-19 but had no symptoms.  If you continue to have no symptoms, you can be with others after:  10 days have passed since test.  Depending on your healthcare providers advice and availability of testing, you might get tested to see if you still have COVID-19. If you will be tested, you can be around others after you receive two negative test results in a row, at least 24 hours apart.    If you develop symptoms after testing positive, follow the guidance above for I think or know I had COVID, and I had symptoms.    I have a weakened immune system (immunocompromised) due to a health condition or medication. When can I be around others?  People with conditions that weaken their immune system might need to stay home longer than 10 days. Talk to your healthcare provider for more information. If testing is available in your community, it may be recommended by your healthcare provider.  You can be with others after you receive two negative test results in a row, at least 24 hours apart.    For Anyone Who Has Been Around a Person with COVID-19 it is important to remember that anyone who has close contact with someone with COVID-19 should stay home for 14 days after exposure based on the time it takes to develop illness.    For more information go to: https://www.cdc.gov/coronavirus/2019-ncov/if-you-are-sick/end-home-isolation.html    Your provider discussed your plan of care with you during your physical exam. It was reviewed once more by the provider when giving you an after visit summary. If the patient is a minor, the discharge instructions were discussed with an  adult and that adult acknowledge their understanding of the provider's teaching.

## 2020-07-25 LAB — SARS-COV-2 RNA RESP QL NAA+PROBE: NOT DETECTED

## 2020-09-07 ENCOUNTER — CLINICAL SUPPORT (OUTPATIENT)
Dept: URGENT CARE | Facility: CLINIC | Age: 70
End: 2020-09-07
Payer: MEDICARE

## 2020-09-07 VITALS — TEMPERATURE: 98 F | OXYGEN SATURATION: 95 % | HEART RATE: 91 BPM

## 2020-09-07 DIAGNOSIS — Z03.818 ENCOUNTER FOR OBSERVATION FOR SUSPECTED EXPOSURE TO OTHER BIOLOGICAL AGENTS RULED OUT: ICD-10-CM

## 2020-09-07 PROCEDURE — U0003 INFECTIOUS AGENT DETECTION BY NUCLEIC ACID (DNA OR RNA); SEVERE ACUTE RESPIRATORY SYNDROME CORONAVIRUS 2 (SARS-COV-2) (CORONAVIRUS DISEASE [COVID-19]), AMPLIFIED PROBE TECHNIQUE, MAKING USE OF HIGH THROUGHPUT TECHNOLOGIES AS DESCRIBED BY CMS-2020-01-R: HCPCS

## 2020-09-07 NOTE — PROGRESS NOTES

## 2020-09-08 LAB — SARS-COV-2 RNA RESP QL NAA+PROBE: NOT DETECTED

## 2020-09-11 PROBLEM — S01.80XA UNSPECIFIED OPEN WOUND OF OTHER PART OF HEAD, INITIAL ENCOUNTER: Status: ACTIVE | Noted: 2020-09-11

## 2020-09-23 ENCOUNTER — PATIENT MESSAGE (OUTPATIENT)
Dept: OBSTETRICS AND GYNECOLOGY | Facility: CLINIC | Age: 70
End: 2020-09-23

## 2020-09-23 DIAGNOSIS — Z12.31 VISIT FOR SCREENING MAMMOGRAM: Primary | ICD-10-CM

## 2020-10-27 ENCOUNTER — PATIENT MESSAGE (OUTPATIENT)
Dept: SPORTS MEDICINE | Facility: CLINIC | Age: 70
End: 2020-10-27

## 2020-10-30 ENCOUNTER — OFFICE VISIT (OUTPATIENT)
Dept: PODIATRY | Facility: CLINIC | Age: 70
End: 2020-10-30
Payer: MEDICARE

## 2020-10-30 ENCOUNTER — HOSPITAL ENCOUNTER (OUTPATIENT)
Dept: RADIOLOGY | Facility: HOSPITAL | Age: 70
Discharge: HOME | End: 2020-10-30
Attending: PODIATRIST
Payer: MEDICARE

## 2020-10-30 DIAGNOSIS — M77.42 METATARSALGIA OF LEFT FOOT: Primary | ICD-10-CM

## 2020-10-30 DIAGNOSIS — M77.42 METATARSALGIA OF LEFT FOOT: ICD-10-CM

## 2020-10-30 DIAGNOSIS — M77.8 EXTENSOR TENDONITIS OF FOOT: ICD-10-CM

## 2020-10-30 PROCEDURE — 1101F PT FALLS ASSESS-DOCD LE1/YR: CPT | Mod: CPTII,S$GLB,, | Performed by: PODIATRIST

## 2020-10-30 PROCEDURE — 73630 X-RAY EXAM OF FOOT: CPT | Mod: TC,PO,LT

## 2020-10-30 PROCEDURE — 1159F MED LIST DOCD IN RCRD: CPT | Mod: S$GLB,,, | Performed by: PODIATRIST

## 2020-10-30 PROCEDURE — 73630 XR FOOT COMPLETE 3 VIEW LEFT: ICD-10-PCS | Mod: 26,LT,, | Performed by: RADIOLOGY

## 2020-10-30 PROCEDURE — 1101F PR PT FALLS ASSESS DOC 0-1 FALLS W/OUT INJ PAST YR: ICD-10-PCS | Mod: CPTII,S$GLB,, | Performed by: PODIATRIST

## 2020-10-30 PROCEDURE — 99213 PR OFFICE/OUTPT VISIT, EST, LEVL III, 20-29 MIN: ICD-10-PCS | Mod: S$GLB,,, | Performed by: PODIATRIST

## 2020-10-30 PROCEDURE — 99999 PR PBB SHADOW E&M-EST. PATIENT-LVL IV: CPT | Mod: PBBFAC,,, | Performed by: PODIATRIST

## 2020-10-30 PROCEDURE — 1159F PR MEDICATION LIST DOCUMENTED IN MEDICAL RECORD: ICD-10-PCS | Mod: S$GLB,,, | Performed by: PODIATRIST

## 2020-10-30 PROCEDURE — 73630 X-RAY EXAM OF FOOT: CPT | Mod: 26,LT,, | Performed by: RADIOLOGY

## 2020-10-30 PROCEDURE — 99213 OFFICE O/P EST LOW 20 MIN: CPT | Mod: S$GLB,,, | Performed by: PODIATRIST

## 2020-10-30 PROCEDURE — 99999 PR PBB SHADOW E&M-EST. PATIENT-LVL IV: ICD-10-PCS | Mod: PBBFAC,,, | Performed by: PODIATRIST

## 2020-11-08 NOTE — PROGRESS NOTES
Subjective:      Patient ID: Julee Bee is a 70 y.o. female.    Chief Complaint: Foot Swelling (left foot ; PCP Dr. Denton 9/1/20 ; A1c 6.5 8/31/20)    Julee is a 70 y.o. female who presents to the clinic upon referral from Dr. Ruma bernal. provider found  for evaluation and treatment of diabetic feet. Julee has a past medical history of Back pain, Colon polyp, GERD (gastroesophageal reflux disease), H/O bronchitis, H/O: pneumonia, Hearing aid worn, Hyperlipidemia, Hypertension, and IBS (irritable bowel syndrome). Patient relates pain and swelling to the left foot with weight bearing activities, worsening over the last several weeks.     PCP: Devin Denton MD    Date Last Seen by PCP: 6/12/20    Current shoe gear: Casual shoes    Hemoglobin A1C   Date Value Ref Range Status   08/31/2020 6.5 (H) 0.0 - 5.6 % Final     Comment:     Reference Interval:  5.0 - 5.6 Normal   5.7 - 6.4 High Risk   > 6.5 Diabetic    Hgb A1c results are standardized based on the (NGSP) National   Glycohemoglobin Standardization Program.    Hemoglobin A1C levels are related to mean serum/plasma glucose   during the preceding 2-3 months.        08/03/2020 6.7 (H) 0.0 - 5.6 % Final     Comment:     Reference Interval:  5.0 - 5.6 Normal   5.7 - 6.4 High Risk   > 6.5 Diabetic    Hgb A1c results are standardized based on the (NGSP) National   Glycohemoglobin Standardization Program.    Hemoglobin A1C levels are related to mean serum/plasma glucose   during the preceding 2-3 months.        06/11/2020 7.0 (H) 0.0 - 5.6 % Final     Comment:     Reference Interval:  5.0 - 5.6 Normal   5.7 - 6.4 High Risk   > 6.5 Diabetic    Hgb A1c results are standardized based on the (NGSP) National   Glycohemoglobin Standardization Program.    Hemoglobin A1C levels are related to mean serum/plasma glucose   during the preceding 2-3 months.                Review of Systems   Constitution: Negative for chills and fever.   Cardiovascular: Positive for leg  swelling. Negative for claudication.   Respiratory: Negative for shortness of breath.    Skin: Positive for nail changes. Negative for itching and rash.   Musculoskeletal: Positive for arthritis. Negative for muscle cramps, muscle weakness and myalgias.   Gastrointestinal: Negative for nausea and vomiting.   Neurological: Positive for paresthesias. Negative for focal weakness, loss of balance and numbness.           Objective:      Physical Exam  Constitutional:       General: She is not in acute distress.     Appearance: She is well-developed. She is not diaphoretic.   Cardiovascular:      Pulses:           Dorsalis pedis pulses are 2+ on the right side and 2+ on the left side.        Posterior tibial pulses are 1+ on the right side and 1+ on the left side.      Comments: < 3 sec capillary refill time to toes 1-5 bilateral. Toes and feet are warm to touch proximally with normal distal cooling b/l. There is diminished hair growth on the feet and toes b/l. There is 1+ edema b/l with varicosities present b/l.     Musculoskeletal:      Comments: Equinus noted b/l ankles with < 10 deg DF noted. MMT 5/5 in DF/PF/Inv/Ev resistance with no reproduction of pain in any direction. Passive range of motion of ankle and pedal joints is painless b/l.    Left foot slightly more edema than right with some pain to palpation in the arch and plantar metatarsals, also some pain dorsal with pain to active DF against resistance   Feet:      Right foot:      Protective Sensation: 10 sites tested. 10 sites sensed.      Left foot:      Protective Sensation: 10 sites tested. 10 sites sensed.   Skin:     General: Skin is warm and dry.      Coloration: Skin is not pale.      Findings: No abrasion, bruising, burn, ecchymosis, erythema, laceration, lesion, petechiae or rash.      Nails: There is no clubbing.        Comments: Skin thin and atrophic    Nails 1-5 bilateral are thick 3-4 mm, long 3-6 mm, and discolored with subungual debris      Neurological:      Mental Status: She is alert and oriented to person, place, and time.      Sensory: No sensory deficit.      Motor: No tremor, atrophy or abnormal muscle tone.      Comments: Negative tinel sign bilateral.   Psychiatric:         Behavior: Behavior normal.               Assessment:       Encounter Diagnoses   Name Primary?    Metatarsalgia of left foot Yes    Extensor tendonitis of foot          Plan:       Julee was seen today for foot swelling.    Diagnoses and all orders for this visit:    Metatarsalgia of left foot  -     X-Ray Foot Complete Left; Future    Extensor tendonitis of foot  -     X-Ray Foot Complete Left; Future      I counseled the patient on her conditions, their implications and medical management.    Shoe inspection. Diabetic Foot Education. Patient reminded of the importance of good nutrition and blood sugar control to help prevent podiatric complications of diabetes. Patient instructed on proper foot hygeine. We discussed wearing proper shoe gear, daily foot inspections, never walking without protective shoe gear, never putting sharp instruments to feet    X-ray to check for stress fracture    Recommend only supportive shoes at all times    Return 6 weeks follow up consider seven Celis DPM

## 2020-12-03 ENCOUNTER — OFFICE VISIT (OUTPATIENT)
Dept: PODIATRY | Facility: CLINIC | Age: 70
End: 2020-12-03
Payer: MEDICARE

## 2020-12-03 VITALS — BODY MASS INDEX: 36.02 KG/M2 | WEIGHT: 183.44 LBS | HEIGHT: 60 IN

## 2020-12-03 DIAGNOSIS — E11.51 TYPE II DIABETES MELLITUS WITH PERIPHERAL CIRCULATORY DISORDER: ICD-10-CM

## 2020-12-03 DIAGNOSIS — M77.42 METATARSALGIA OF LEFT FOOT: Primary | ICD-10-CM

## 2020-12-03 PROCEDURE — 99999 PR PBB SHADOW E&M-EST. PATIENT-LVL III: CPT | Mod: PBBFAC,,, | Performed by: PODIATRIST

## 2020-12-03 PROCEDURE — 3044F HG A1C LEVEL LT 7.0%: CPT | Mod: CPTII,S$GLB,, | Performed by: PODIATRIST

## 2020-12-03 PROCEDURE — 99499 UNLISTED E&M SERVICE: CPT | Mod: S$GLB,,, | Performed by: PODIATRIST

## 2020-12-03 PROCEDURE — 1159F PR MEDICATION LIST DOCUMENTED IN MEDICAL RECORD: ICD-10-PCS | Mod: S$GLB,,, | Performed by: PODIATRIST

## 2020-12-03 PROCEDURE — 3044F PR MOST RECENT HEMOGLOBIN A1C LEVEL <7.0%: ICD-10-PCS | Mod: CPTII,S$GLB,, | Performed by: PODIATRIST

## 2020-12-03 PROCEDURE — 1126F AMNT PAIN NOTED NONE PRSNT: CPT | Mod: S$GLB,,, | Performed by: PODIATRIST

## 2020-12-03 PROCEDURE — 99213 OFFICE O/P EST LOW 20 MIN: CPT | Mod: S$GLB,,, | Performed by: PODIATRIST

## 2020-12-03 PROCEDURE — 3008F PR BODY MASS INDEX (BMI) DOCUMENTED: ICD-10-PCS | Mod: CPTII,S$GLB,, | Performed by: PODIATRIST

## 2020-12-03 PROCEDURE — 1101F PR PT FALLS ASSESS DOC 0-1 FALLS W/OUT INJ PAST YR: ICD-10-PCS | Mod: CPTII,S$GLB,, | Performed by: PODIATRIST

## 2020-12-03 PROCEDURE — 3078F DIAST BP <80 MM HG: CPT | Mod: CPTII,S$GLB,, | Performed by: PODIATRIST

## 2020-12-03 PROCEDURE — 99213 PR OFFICE/OUTPT VISIT, EST, LEVL III, 20-29 MIN: ICD-10-PCS | Mod: S$GLB,,, | Performed by: PODIATRIST

## 2020-12-03 PROCEDURE — 3288F PR FALLS RISK ASSESSMENT DOCUMENTED: ICD-10-PCS | Mod: CPTII,S$GLB,, | Performed by: PODIATRIST

## 2020-12-03 PROCEDURE — 1159F MED LIST DOCD IN RCRD: CPT | Mod: S$GLB,,, | Performed by: PODIATRIST

## 2020-12-03 PROCEDURE — 3078F PR MOST RECENT DIASTOLIC BLOOD PRESSURE < 80 MM HG: ICD-10-PCS | Mod: CPTII,S$GLB,, | Performed by: PODIATRIST

## 2020-12-03 PROCEDURE — 3288F FALL RISK ASSESSMENT DOCD: CPT | Mod: CPTII,S$GLB,, | Performed by: PODIATRIST

## 2020-12-03 PROCEDURE — 1157F PR ADVANCE CARE PLAN OR EQUIV PRESENT IN MEDICAL RECORD: ICD-10-PCS | Mod: S$GLB,,, | Performed by: PODIATRIST

## 2020-12-03 PROCEDURE — 3008F BODY MASS INDEX DOCD: CPT | Mod: CPTII,S$GLB,, | Performed by: PODIATRIST

## 2020-12-03 PROCEDURE — 3074F SYST BP LT 130 MM HG: CPT | Mod: CPTII,S$GLB,, | Performed by: PODIATRIST

## 2020-12-03 PROCEDURE — 1126F PR PAIN SEVERITY QUANTIFIED, NO PAIN PRESENT: ICD-10-PCS | Mod: S$GLB,,, | Performed by: PODIATRIST

## 2020-12-03 PROCEDURE — 99499 RISK ADDL DX/OHS AUDIT: ICD-10-PCS | Mod: S$GLB,,, | Performed by: PODIATRIST

## 2020-12-03 PROCEDURE — 99999 PR PBB SHADOW E&M-EST. PATIENT-LVL III: ICD-10-PCS | Mod: PBBFAC,,, | Performed by: PODIATRIST

## 2020-12-03 PROCEDURE — 1101F PT FALLS ASSESS-DOCD LE1/YR: CPT | Mod: CPTII,S$GLB,, | Performed by: PODIATRIST

## 2020-12-03 PROCEDURE — 3074F PR MOST RECENT SYSTOLIC BLOOD PRESSURE < 130 MM HG: ICD-10-PCS | Mod: CPTII,S$GLB,, | Performed by: PODIATRIST

## 2020-12-03 PROCEDURE — 1157F ADVNC CARE PLAN IN RCRD: CPT | Mod: S$GLB,,, | Performed by: PODIATRIST

## 2020-12-03 NOTE — PROGRESS NOTES
Subjective:      Patient ID: Julee Bee is a 70 y.o. female.    Chief Complaint: Heel Pain (PCP--09/01/2020) and Diabetes Mellitus (A1c-6.5-08/31/2020)    Julee is a 70 y.o. female who presents to the clinic upon referral from Dr. Ruma bernal. provider found  for evaluation and treatment of diabetic feet. Julee has a past medical history of Back pain, Colon polyp, GERD (gastroesophageal reflux disease), H/O bronchitis, H/O: pneumonia, Hearing aid worn, Hyperlipidemia, Hypertension, and IBS (irritable bowel syndrome). Patient relates pain and swelling to the left foot with weight bearing activities, worsening over the last several weeks.     12/3/20: Patient returns for follow up bilateral heel pain reports it is better, there is pain now more to the ball of the foot left side. Pain with weight bearing activities better with rest. No trauma.    PCP: Devin Denton MD    Date Last Seen by PCP: 6/12/20    Current shoe gear: Casual shoes    Hemoglobin A1C   Date Value Ref Range Status   08/31/2020 6.5 (H) 0.0 - 5.6 % Final     Comment:     Reference Interval:  5.0 - 5.6 Normal   5.7 - 6.4 High Risk   > 6.5 Diabetic    Hgb A1c results are standardized based on the (NGSP) National   Glycohemoglobin Standardization Program.    Hemoglobin A1C levels are related to mean serum/plasma glucose   during the preceding 2-3 months.        08/03/2020 6.7 (H) 0.0 - 5.6 % Final     Comment:     Reference Interval:  5.0 - 5.6 Normal   5.7 - 6.4 High Risk   > 6.5 Diabetic    Hgb A1c results are standardized based on the (NGSP) National   Glycohemoglobin Standardization Program.    Hemoglobin A1C levels are related to mean serum/plasma glucose   during the preceding 2-3 months.        06/11/2020 7.0 (H) 0.0 - 5.6 % Final     Comment:     Reference Interval:  5.0 - 5.6 Normal   5.7 - 6.4 High Risk   > 6.5 Diabetic    Hgb A1c results are standardized based on the (NGSP) National   Glycohemoglobin Standardization Program.     Hemoglobin A1C levels are related to mean serum/plasma glucose   during the preceding 2-3 months.                Review of Systems   Constitution: Negative for chills and fever.   Cardiovascular: Positive for leg swelling. Negative for claudication.   Respiratory: Negative for shortness of breath.    Skin: Positive for nail changes. Negative for itching and rash.   Musculoskeletal: Positive for arthritis. Negative for muscle cramps, muscle weakness and myalgias.   Gastrointestinal: Negative for nausea and vomiting.   Neurological: Positive for paresthesias. Negative for focal weakness, loss of balance and numbness.           Objective:      Physical Exam  Constitutional:       General: She is not in acute distress.     Appearance: She is well-developed. She is not diaphoretic.   Cardiovascular:      Pulses:           Dorsalis pedis pulses are 2+ on the right side and 2+ on the left side.        Posterior tibial pulses are 1+ on the right side and 1+ on the left side.      Comments: < 3 sec capillary refill time to toes 1-5 bilateral. Toes and feet are warm to touch proximally with normal distal cooling b/l. There is diminished hair growth on the feet and toes b/l. There is 1+ edema b/l with varicosities present b/l.     Musculoskeletal:      Comments: Equinus noted b/l ankles with < 10 deg DF noted. MMT 5/5 in DF/PF/Inv/Ev resistance with no reproduction of pain in any direction. Passive range of motion of ankle and pedal joints is painless b/l.    Left foot slightly more edema than right with some pain to palpation in the arch and plantar metatarsals, also some pain dorsal with pain to active DF against resistance    Feet:      Right foot:      Protective Sensation: 10 sites tested. 10 sites sensed.      Left foot:      Protective Sensation: 10 sites tested. 10 sites sensed.   Skin:     General: Skin is warm and dry.      Coloration: Skin is not pale.      Findings: No abrasion, bruising, burn, ecchymosis,  erythema, laceration, lesion, petechiae or rash.      Nails: There is no clubbing.        Comments: Skin thin and atrophic    Nails 1-5 bilateral are thick 3-4 mm, long 3-6 mm, and discolored with subungual debris     Neurological:      Mental Status: She is alert and oriented to person, place, and time.      Sensory: No sensory deficit.      Motor: No tremor, atrophy or abnormal muscle tone.      Comments: Negative tinel sign bilateral.   Psychiatric:         Behavior: Behavior normal.               Assessment:       Encounter Diagnoses   Name Primary?    Metatarsalgia of left foot Yes    Type II diabetes mellitus with peripheral circulatory disorder          Plan:       Julee was seen today for heel pain and diabetes mellitus.    Diagnoses and all orders for this visit:    Metatarsalgia of left foot    Type II diabetes mellitus with peripheral circulatory disorder      I counseled the patient on her conditions, their implications and medical management.    Shoe inspection. Diabetic Foot Education. Patient reminded of the importance of good nutrition and blood sugar control to help prevent podiatric complications of diabetes. Patient instructed on proper foot hygeine. We discussed wearing proper shoe gear, daily foot inspections, never walking without protective shoe gear, never putting sharp instruments to feet    Recommend only supportive shoes at all times    Patient will obtain over the counter arch supports and wear them in shoes whenever possible.  Athletic shoes intended for walking or running are usually best.    Return 6 weeks follow up consider seven Celis DPM

## 2020-12-28 NOTE — PROGRESS NOTES
Patient, Julee Bee (MRN #[id number]), presented with a recorded BMI of 35.82 kg/m^2 and a documented comorbidity(s):  - Diabetes Mellitus Type 2  to which the severe obesity is a contributing factor. This is consistent with the definition of severe obesity (BMI 35.0-39.9) with comorbidity (ICD-10 E66.01, Z68.35). The patient's severe obesity was monitored, evaluated, addressed. This addendum to the medical record is made on 12/28/2020.

## 2021-01-07 ENCOUNTER — PATIENT MESSAGE (OUTPATIENT)
Dept: OBSTETRICS AND GYNECOLOGY | Facility: CLINIC | Age: 71
End: 2021-01-07

## 2021-01-12 ENCOUNTER — IMMUNIZATION (OUTPATIENT)
Dept: FAMILY MEDICINE | Facility: CLINIC | Age: 71
End: 2021-01-12
Payer: MEDICARE

## 2021-01-12 DIAGNOSIS — Z23 NEED FOR VACCINATION: ICD-10-CM

## 2021-01-12 PROCEDURE — 91300 COVID-19, MRNA, LNP-S, PF, 30 MCG/0.3 ML DOSE VACCINE: CPT | Mod: PBBFAC | Performed by: FAMILY MEDICINE

## 2021-01-30 ENCOUNTER — PATIENT MESSAGE (OUTPATIENT)
Dept: OBSTETRICS AND GYNECOLOGY | Facility: CLINIC | Age: 71
End: 2021-01-30

## 2021-02-02 ENCOUNTER — CLINICAL SUPPORT (OUTPATIENT)
Dept: OBSTETRICS AND GYNECOLOGY | Facility: CLINIC | Age: 71
End: 2021-02-02
Payer: MEDICARE

## 2021-02-02 ENCOUNTER — PATIENT MESSAGE (OUTPATIENT)
Dept: OBSTETRICS AND GYNECOLOGY | Facility: CLINIC | Age: 71
End: 2021-02-02

## 2021-02-02 ENCOUNTER — HOSPITAL ENCOUNTER (OUTPATIENT)
Dept: RADIOLOGY | Facility: HOSPITAL | Age: 71
Discharge: HOME | End: 2021-02-02
Attending: INTERNAL MEDICINE
Payer: MEDICARE

## 2021-02-02 ENCOUNTER — IMMUNIZATION (OUTPATIENT)
Dept: FAMILY MEDICINE | Facility: CLINIC | Age: 71
End: 2021-02-02
Payer: MEDICARE

## 2021-02-02 ENCOUNTER — TELEPHONE (OUTPATIENT)
Dept: OBSTETRICS AND GYNECOLOGY | Facility: CLINIC | Age: 71
End: 2021-02-02

## 2021-02-02 VITALS — HEIGHT: 60 IN | BODY MASS INDEX: 36.02 KG/M2 | WEIGHT: 183.44 LBS

## 2021-02-02 DIAGNOSIS — Z12.31 VISIT FOR SCREENING MAMMOGRAM: ICD-10-CM

## 2021-02-02 DIAGNOSIS — Z23 NEED FOR VACCINATION: Primary | ICD-10-CM

## 2021-02-02 DIAGNOSIS — R39.89 POSSIBLE URINARY TRACT INFECTION: Primary | ICD-10-CM

## 2021-02-02 PROCEDURE — 77067 SCR MAMMO BI INCL CAD: CPT | Mod: 26,,, | Performed by: RADIOLOGY

## 2021-02-02 PROCEDURE — 77067 SCR MAMMO BI INCL CAD: CPT | Mod: TC,PN

## 2021-02-02 PROCEDURE — 0002A COVID-19, MRNA, LNP-S, PF, 30 MCG/0.3 ML DOSE VACCINE: CPT | Mod: PBBFAC | Performed by: FAMILY MEDICINE

## 2021-02-02 PROCEDURE — 77063 BREAST TOMOSYNTHESIS BI: CPT | Mod: 26,,, | Performed by: RADIOLOGY

## 2021-02-02 PROCEDURE — 77063 MAMMO DIGITAL SCREENING BILAT WITH TOMO: ICD-10-PCS | Mod: 26,,, | Performed by: RADIOLOGY

## 2021-02-02 PROCEDURE — 77067 MAMMO DIGITAL SCREENING BILAT WITH TOMO: ICD-10-PCS | Mod: 26,,, | Performed by: RADIOLOGY

## 2021-02-02 PROCEDURE — 91300 COVID-19, MRNA, LNP-S, PF, 30 MCG/0.3 ML DOSE VACCINE: CPT | Mod: PBBFAC | Performed by: FAMILY MEDICINE

## 2021-03-01 PROBLEM — E11.51 TYPE II DIABETES MELLITUS WITH PERIPHERAL CIRCULATORY DISORDER: Status: ACTIVE | Noted: 2021-03-01

## 2021-09-19 ENCOUNTER — PATIENT MESSAGE (OUTPATIENT)
Dept: PODIATRY | Facility: CLINIC | Age: 71
End: 2021-09-19

## 2021-09-20 ENCOUNTER — OFFICE VISIT (OUTPATIENT)
Dept: PODIATRY | Facility: CLINIC | Age: 71
End: 2021-09-20
Payer: MEDICARE

## 2021-09-20 VITALS — DIASTOLIC BLOOD PRESSURE: 59 MMHG | SYSTOLIC BLOOD PRESSURE: 120 MMHG | HEART RATE: 78 BPM

## 2021-09-20 DIAGNOSIS — E11.51 TYPE II DIABETES MELLITUS WITH PERIPHERAL CIRCULATORY DISORDER: Primary | ICD-10-CM

## 2021-09-20 PROCEDURE — 99999 PR PBB SHADOW E&M-EST. PATIENT-LVL III: CPT | Mod: PBBFAC,,, | Performed by: PODIATRIST

## 2021-09-20 PROCEDURE — 3074F SYST BP LT 130 MM HG: CPT | Mod: CPTII,S$GLB,, | Performed by: PODIATRIST

## 2021-09-20 PROCEDURE — 4010F PR ACE/ARB THEARPY RXD/TAKEN: ICD-10-PCS | Mod: CPTII,S$GLB,, | Performed by: PODIATRIST

## 2021-09-20 PROCEDURE — 1157F ADVNC CARE PLAN IN RCRD: CPT | Mod: CPTII,S$GLB,, | Performed by: PODIATRIST

## 2021-09-20 PROCEDURE — 1159F MED LIST DOCD IN RCRD: CPT | Mod: CPTII,S$GLB,, | Performed by: PODIATRIST

## 2021-09-20 PROCEDURE — 99999 PR PBB SHADOW E&M-EST. PATIENT-LVL III: ICD-10-PCS | Mod: PBBFAC,,, | Performed by: PODIATRIST

## 2021-09-20 PROCEDURE — 1160F PR REVIEW ALL MEDS BY PRESCRIBER/CLIN PHARMACIST DOCUMENTED: ICD-10-PCS | Mod: CPTII,S$GLB,, | Performed by: PODIATRIST

## 2021-09-20 PROCEDURE — 3061F NEG MICROALBUMINURIA REV: CPT | Mod: CPTII,S$GLB,, | Performed by: PODIATRIST

## 2021-09-20 PROCEDURE — 99213 OFFICE O/P EST LOW 20 MIN: CPT | Mod: S$GLB,,, | Performed by: PODIATRIST

## 2021-09-20 PROCEDURE — 1126F AMNT PAIN NOTED NONE PRSNT: CPT | Mod: CPTII,S$GLB,, | Performed by: PODIATRIST

## 2021-09-20 PROCEDURE — 3078F PR MOST RECENT DIASTOLIC BLOOD PRESSURE < 80 MM HG: ICD-10-PCS | Mod: CPTII,S$GLB,, | Performed by: PODIATRIST

## 2021-09-20 PROCEDURE — 1126F PR PAIN SEVERITY QUANTIFIED, NO PAIN PRESENT: ICD-10-PCS | Mod: CPTII,S$GLB,, | Performed by: PODIATRIST

## 2021-09-20 PROCEDURE — 3044F PR MOST RECENT HEMOGLOBIN A1C LEVEL <7.0%: ICD-10-PCS | Mod: CPTII,S$GLB,, | Performed by: PODIATRIST

## 2021-09-20 PROCEDURE — 3074F PR MOST RECENT SYSTOLIC BLOOD PRESSURE < 130 MM HG: ICD-10-PCS | Mod: CPTII,S$GLB,, | Performed by: PODIATRIST

## 2021-09-20 PROCEDURE — 1101F PR PT FALLS ASSESS DOC 0-1 FALLS W/OUT INJ PAST YR: ICD-10-PCS | Mod: CPTII,S$GLB,, | Performed by: PODIATRIST

## 2021-09-20 PROCEDURE — 1159F PR MEDICATION LIST DOCUMENTED IN MEDICAL RECORD: ICD-10-PCS | Mod: CPTII,S$GLB,, | Performed by: PODIATRIST

## 2021-09-20 PROCEDURE — 1101F PT FALLS ASSESS-DOCD LE1/YR: CPT | Mod: CPTII,S$GLB,, | Performed by: PODIATRIST

## 2021-09-20 PROCEDURE — 1160F RVW MEDS BY RX/DR IN RCRD: CPT | Mod: CPTII,S$GLB,, | Performed by: PODIATRIST

## 2021-09-20 PROCEDURE — 3288F FALL RISK ASSESSMENT DOCD: CPT | Mod: CPTII,S$GLB,, | Performed by: PODIATRIST

## 2021-09-20 PROCEDURE — 3288F PR FALLS RISK ASSESSMENT DOCUMENTED: ICD-10-PCS | Mod: CPTII,S$GLB,, | Performed by: PODIATRIST

## 2021-09-20 PROCEDURE — 99213 PR OFFICE/OUTPT VISIT, EST, LEVL III, 20-29 MIN: ICD-10-PCS | Mod: S$GLB,,, | Performed by: PODIATRIST

## 2021-09-20 PROCEDURE — 3078F DIAST BP <80 MM HG: CPT | Mod: CPTII,S$GLB,, | Performed by: PODIATRIST

## 2021-09-20 PROCEDURE — 3066F NEPHROPATHY DOC TX: CPT | Mod: CPTII,S$GLB,, | Performed by: PODIATRIST

## 2021-09-20 PROCEDURE — 3061F PR NEG MICROALBUMINURIA RESULT DOCUMENTED/REVIEW: ICD-10-PCS | Mod: CPTII,S$GLB,, | Performed by: PODIATRIST

## 2021-09-20 PROCEDURE — 3066F PR DOCUMENTATION OF TREATMENT FOR NEPHROPATHY: ICD-10-PCS | Mod: CPTII,S$GLB,, | Performed by: PODIATRIST

## 2021-09-20 PROCEDURE — 4010F ACE/ARB THERAPY RXD/TAKEN: CPT | Mod: CPTII,S$GLB,, | Performed by: PODIATRIST

## 2021-09-20 PROCEDURE — 1157F PR ADVANCE CARE PLAN OR EQUIV PRESENT IN MEDICAL RECORD: ICD-10-PCS | Mod: CPTII,S$GLB,, | Performed by: PODIATRIST

## 2021-09-20 PROCEDURE — 3044F HG A1C LEVEL LT 7.0%: CPT | Mod: CPTII,S$GLB,, | Performed by: PODIATRIST

## 2021-10-07 ENCOUNTER — IMMUNIZATION (OUTPATIENT)
Dept: FAMILY MEDICINE | Facility: CLINIC | Age: 71
End: 2021-10-07
Payer: MEDICARE

## 2021-10-07 DIAGNOSIS — Z23 NEED FOR VACCINATION: Primary | ICD-10-CM

## 2021-10-07 PROCEDURE — 0003A COVID-19, MRNA, LNP-S, PF, 30 MCG/0.3 ML DOSE VACCINE: CPT | Mod: PBBFAC | Performed by: FAMILY MEDICINE

## 2021-10-07 PROCEDURE — 91300 COVID-19, MRNA, LNP-S, PF, 30 MCG/0.3 ML DOSE VACCINE: CPT | Mod: PBBFAC | Performed by: FAMILY MEDICINE

## 2021-10-15 ENCOUNTER — PATIENT MESSAGE (OUTPATIENT)
Dept: OBSTETRICS AND GYNECOLOGY | Facility: CLINIC | Age: 71
End: 2021-10-15
Payer: MEDICARE

## 2021-10-26 ENCOUNTER — IMMUNIZATION (OUTPATIENT)
Dept: PHARMACY | Facility: CLINIC | Age: 71
End: 2021-10-26
Payer: MEDICARE

## 2021-10-27 DIAGNOSIS — M54.50 LOW BACK PAIN, UNSPECIFIED: Primary | ICD-10-CM

## 2021-11-03 ENCOUNTER — CLINICAL SUPPORT (OUTPATIENT)
Dept: REHABILITATION | Facility: HOSPITAL | Age: 71
End: 2021-11-03
Payer: MEDICARE

## 2021-11-03 DIAGNOSIS — M25.551 CHRONIC HIP PAIN, BILATERAL: Primary | ICD-10-CM

## 2021-11-03 DIAGNOSIS — G89.29 CHRONIC HIP PAIN, BILATERAL: Primary | ICD-10-CM

## 2021-11-03 DIAGNOSIS — R26.9 GAIT ABNORMALITY: ICD-10-CM

## 2021-11-03 DIAGNOSIS — M54.50 CHRONIC BILATERAL LOW BACK PAIN WITHOUT SCIATICA: ICD-10-CM

## 2021-11-03 DIAGNOSIS — M25.552 CHRONIC HIP PAIN, BILATERAL: Primary | ICD-10-CM

## 2021-11-03 DIAGNOSIS — G89.29 CHRONIC BILATERAL LOW BACK PAIN WITHOUT SCIATICA: ICD-10-CM

## 2021-11-03 PROCEDURE — 97140 MANUAL THERAPY 1/> REGIONS: CPT | Mod: PO

## 2021-11-03 PROCEDURE — 97162 PT EVAL MOD COMPLEX 30 MIN: CPT | Mod: PO

## 2021-11-03 PROCEDURE — 97112 NEUROMUSCULAR REEDUCATION: CPT | Mod: PO

## 2021-11-04 PROBLEM — G89.29 CHRONIC MIDLINE LOW BACK PAIN WITHOUT SCIATICA: Chronic | Status: ACTIVE | Noted: 2021-11-04

## 2021-11-04 PROBLEM — R26.9 GAIT ABNORMALITY: Status: ACTIVE | Noted: 2021-11-04

## 2021-11-04 PROBLEM — M54.50 CHRONIC MIDLINE LOW BACK PAIN WITHOUT SCIATICA: Chronic | Status: ACTIVE | Noted: 2021-11-04

## 2021-11-08 ENCOUNTER — CLINICAL SUPPORT (OUTPATIENT)
Dept: REHABILITATION | Facility: HOSPITAL | Age: 71
End: 2021-11-08
Payer: MEDICARE

## 2021-11-08 DIAGNOSIS — G89.29 CHRONIC MIDLINE LOW BACK PAIN WITHOUT SCIATICA: ICD-10-CM

## 2021-11-08 DIAGNOSIS — M54.50 CHRONIC MIDLINE LOW BACK PAIN WITHOUT SCIATICA: ICD-10-CM

## 2021-11-08 DIAGNOSIS — M25.552 CHRONIC HIP PAIN, BILATERAL: ICD-10-CM

## 2021-11-08 DIAGNOSIS — R26.9 GAIT ABNORMALITY: ICD-10-CM

## 2021-11-08 DIAGNOSIS — G89.29 CHRONIC HIP PAIN, BILATERAL: ICD-10-CM

## 2021-11-08 DIAGNOSIS — M25.551 CHRONIC HIP PAIN, BILATERAL: ICD-10-CM

## 2021-11-08 PROCEDURE — 97140 MANUAL THERAPY 1/> REGIONS: CPT | Mod: PO

## 2021-11-08 PROCEDURE — 97110 THERAPEUTIC EXERCISES: CPT | Mod: PO

## 2021-11-08 PROCEDURE — 97112 NEUROMUSCULAR REEDUCATION: CPT | Mod: PO

## 2021-11-10 ENCOUNTER — CLINICAL SUPPORT (OUTPATIENT)
Dept: REHABILITATION | Facility: HOSPITAL | Age: 71
End: 2021-11-10
Payer: MEDICARE

## 2021-11-10 DIAGNOSIS — M25.552 CHRONIC HIP PAIN, BILATERAL: ICD-10-CM

## 2021-11-10 DIAGNOSIS — R26.9 GAIT ABNORMALITY: ICD-10-CM

## 2021-11-10 DIAGNOSIS — M54.50 CHRONIC MIDLINE LOW BACK PAIN WITHOUT SCIATICA: ICD-10-CM

## 2021-11-10 DIAGNOSIS — G89.29 CHRONIC HIP PAIN, BILATERAL: ICD-10-CM

## 2021-11-10 DIAGNOSIS — G89.29 CHRONIC MIDLINE LOW BACK PAIN WITHOUT SCIATICA: ICD-10-CM

## 2021-11-10 DIAGNOSIS — M25.551 CHRONIC HIP PAIN, BILATERAL: ICD-10-CM

## 2021-11-10 PROCEDURE — 97112 NEUROMUSCULAR REEDUCATION: CPT | Mod: PO

## 2021-11-10 PROCEDURE — 97140 MANUAL THERAPY 1/> REGIONS: CPT | Mod: PO

## 2021-11-10 PROCEDURE — 97110 THERAPEUTIC EXERCISES: CPT | Mod: PO

## 2021-11-15 ENCOUNTER — CLINICAL SUPPORT (OUTPATIENT)
Dept: REHABILITATION | Facility: HOSPITAL | Age: 71
End: 2021-11-15
Payer: MEDICARE

## 2021-11-15 DIAGNOSIS — R26.9 GAIT ABNORMALITY: ICD-10-CM

## 2021-11-15 DIAGNOSIS — G89.29 CHRONIC MIDLINE LOW BACK PAIN WITHOUT SCIATICA: ICD-10-CM

## 2021-11-15 DIAGNOSIS — G89.29 CHRONIC HIP PAIN, BILATERAL: ICD-10-CM

## 2021-11-15 DIAGNOSIS — M54.50 CHRONIC MIDLINE LOW BACK PAIN WITHOUT SCIATICA: ICD-10-CM

## 2021-11-15 DIAGNOSIS — M25.551 CHRONIC HIP PAIN, BILATERAL: ICD-10-CM

## 2021-11-15 DIAGNOSIS — M25.552 CHRONIC HIP PAIN, BILATERAL: ICD-10-CM

## 2021-11-15 PROCEDURE — 97110 THERAPEUTIC EXERCISES: CPT | Mod: PO,CQ

## 2021-11-15 PROCEDURE — 97140 MANUAL THERAPY 1/> REGIONS: CPT | Mod: KX,PO

## 2021-11-17 ENCOUNTER — CLINICAL SUPPORT (OUTPATIENT)
Dept: REHABILITATION | Facility: HOSPITAL | Age: 71
End: 2021-11-17
Payer: MEDICARE

## 2021-11-17 DIAGNOSIS — M25.552 CHRONIC HIP PAIN, BILATERAL: ICD-10-CM

## 2021-11-17 DIAGNOSIS — G89.29 CHRONIC MIDLINE LOW BACK PAIN WITHOUT SCIATICA: ICD-10-CM

## 2021-11-17 DIAGNOSIS — R26.9 GAIT ABNORMALITY: ICD-10-CM

## 2021-11-17 DIAGNOSIS — M54.50 CHRONIC MIDLINE LOW BACK PAIN WITHOUT SCIATICA: ICD-10-CM

## 2021-11-17 DIAGNOSIS — G89.29 CHRONIC HIP PAIN, BILATERAL: ICD-10-CM

## 2021-11-17 DIAGNOSIS — M25.551 CHRONIC HIP PAIN, BILATERAL: ICD-10-CM

## 2021-11-17 PROCEDURE — 97110 THERAPEUTIC EXERCISES: CPT | Mod: KX,PO

## 2021-11-22 ENCOUNTER — CLINICAL SUPPORT (OUTPATIENT)
Dept: REHABILITATION | Facility: HOSPITAL | Age: 71
End: 2021-11-22
Payer: MEDICARE

## 2021-11-22 DIAGNOSIS — G89.29 CHRONIC MIDLINE LOW BACK PAIN WITHOUT SCIATICA: ICD-10-CM

## 2021-11-22 DIAGNOSIS — M25.551 CHRONIC HIP PAIN, BILATERAL: ICD-10-CM

## 2021-11-22 DIAGNOSIS — M25.552 CHRONIC HIP PAIN, BILATERAL: ICD-10-CM

## 2021-11-22 DIAGNOSIS — M54.50 CHRONIC MIDLINE LOW BACK PAIN WITHOUT SCIATICA: ICD-10-CM

## 2021-11-22 DIAGNOSIS — R26.9 GAIT ABNORMALITY: ICD-10-CM

## 2021-11-22 DIAGNOSIS — G89.29 CHRONIC HIP PAIN, BILATERAL: ICD-10-CM

## 2021-11-22 PROCEDURE — 97140 MANUAL THERAPY 1/> REGIONS: CPT | Mod: KX,PO

## 2021-11-22 PROCEDURE — 97112 NEUROMUSCULAR REEDUCATION: CPT | Mod: KX,PO

## 2021-11-22 PROCEDURE — 97110 THERAPEUTIC EXERCISES: CPT | Mod: KX,PO

## 2021-11-29 ENCOUNTER — CLINICAL SUPPORT (OUTPATIENT)
Dept: REHABILITATION | Facility: HOSPITAL | Age: 71
End: 2021-11-29
Payer: MEDICARE

## 2021-11-29 DIAGNOSIS — M25.552 CHRONIC HIP PAIN, BILATERAL: ICD-10-CM

## 2021-11-29 DIAGNOSIS — M25.551 CHRONIC HIP PAIN, BILATERAL: ICD-10-CM

## 2021-11-29 DIAGNOSIS — G89.29 CHRONIC MIDLINE LOW BACK PAIN WITHOUT SCIATICA: ICD-10-CM

## 2021-11-29 DIAGNOSIS — G89.29 CHRONIC HIP PAIN, BILATERAL: ICD-10-CM

## 2021-11-29 DIAGNOSIS — R26.9 GAIT ABNORMALITY: ICD-10-CM

## 2021-11-29 DIAGNOSIS — M54.50 CHRONIC MIDLINE LOW BACK PAIN WITHOUT SCIATICA: ICD-10-CM

## 2021-11-29 PROCEDURE — 97140 MANUAL THERAPY 1/> REGIONS: CPT | Mod: KX,PO

## 2021-11-29 PROCEDURE — 97110 THERAPEUTIC EXERCISES: CPT | Mod: KX,PO

## 2021-12-02 ENCOUNTER — CLINICAL SUPPORT (OUTPATIENT)
Dept: REHABILITATION | Facility: HOSPITAL | Age: 71
End: 2021-12-02
Payer: MEDICARE

## 2021-12-02 DIAGNOSIS — G89.29 CHRONIC MIDLINE LOW BACK PAIN WITHOUT SCIATICA: ICD-10-CM

## 2021-12-02 DIAGNOSIS — M25.552 CHRONIC HIP PAIN, BILATERAL: ICD-10-CM

## 2021-12-02 DIAGNOSIS — M54.50 CHRONIC MIDLINE LOW BACK PAIN WITHOUT SCIATICA: ICD-10-CM

## 2021-12-02 DIAGNOSIS — M25.551 CHRONIC HIP PAIN, BILATERAL: ICD-10-CM

## 2021-12-02 DIAGNOSIS — R26.9 GAIT ABNORMALITY: ICD-10-CM

## 2021-12-02 DIAGNOSIS — G89.29 CHRONIC HIP PAIN, BILATERAL: ICD-10-CM

## 2021-12-02 PROCEDURE — 97110 THERAPEUTIC EXERCISES: CPT | Mod: PO

## 2021-12-02 PROCEDURE — 97140 MANUAL THERAPY 1/> REGIONS: CPT | Mod: PO

## 2021-12-06 ENCOUNTER — CLINICAL SUPPORT (OUTPATIENT)
Dept: REHABILITATION | Facility: HOSPITAL | Age: 71
End: 2021-12-06
Payer: MEDICARE

## 2021-12-06 DIAGNOSIS — M25.551 CHRONIC HIP PAIN, BILATERAL: ICD-10-CM

## 2021-12-06 DIAGNOSIS — G89.29 CHRONIC MIDLINE LOW BACK PAIN WITHOUT SCIATICA: ICD-10-CM

## 2021-12-06 DIAGNOSIS — M25.552 CHRONIC HIP PAIN, BILATERAL: ICD-10-CM

## 2021-12-06 DIAGNOSIS — M54.50 CHRONIC MIDLINE LOW BACK PAIN WITHOUT SCIATICA: ICD-10-CM

## 2021-12-06 DIAGNOSIS — G89.29 CHRONIC HIP PAIN, BILATERAL: ICD-10-CM

## 2021-12-06 DIAGNOSIS — R26.9 GAIT ABNORMALITY: ICD-10-CM

## 2021-12-06 PROCEDURE — 97140 MANUAL THERAPY 1/> REGIONS: CPT | Mod: PO

## 2021-12-06 PROCEDURE — 97110 THERAPEUTIC EXERCISES: CPT | Mod: PO

## 2021-12-09 ENCOUNTER — CLINICAL SUPPORT (OUTPATIENT)
Dept: REHABILITATION | Facility: HOSPITAL | Age: 71
End: 2021-12-09
Payer: MEDICARE

## 2021-12-09 DIAGNOSIS — M25.551 CHRONIC HIP PAIN, BILATERAL: ICD-10-CM

## 2021-12-09 DIAGNOSIS — M25.552 CHRONIC HIP PAIN, BILATERAL: ICD-10-CM

## 2021-12-09 DIAGNOSIS — R26.9 GAIT ABNORMALITY: ICD-10-CM

## 2021-12-09 DIAGNOSIS — G89.29 CHRONIC HIP PAIN, BILATERAL: ICD-10-CM

## 2021-12-09 DIAGNOSIS — G89.29 CHRONIC MIDLINE LOW BACK PAIN WITHOUT SCIATICA: ICD-10-CM

## 2021-12-09 DIAGNOSIS — M54.50 CHRONIC MIDLINE LOW BACK PAIN WITHOUT SCIATICA: ICD-10-CM

## 2021-12-09 PROCEDURE — 97110 THERAPEUTIC EXERCISES: CPT | Mod: PO

## 2021-12-09 PROCEDURE — 97140 MANUAL THERAPY 1/> REGIONS: CPT | Mod: PO

## 2021-12-11 ENCOUNTER — PATIENT MESSAGE (OUTPATIENT)
Dept: OBSTETRICS AND GYNECOLOGY | Facility: CLINIC | Age: 71
End: 2021-12-11
Payer: MEDICARE

## 2021-12-20 ENCOUNTER — TELEPHONE (OUTPATIENT)
Dept: OBSTETRICS AND GYNECOLOGY | Facility: CLINIC | Age: 71
End: 2021-12-20
Payer: MEDICARE

## 2021-12-20 ENCOUNTER — CLINICAL SUPPORT (OUTPATIENT)
Dept: REHABILITATION | Facility: HOSPITAL | Age: 71
End: 2021-12-20
Payer: MEDICARE

## 2021-12-20 DIAGNOSIS — R26.9 GAIT ABNORMALITY: ICD-10-CM

## 2021-12-20 DIAGNOSIS — M54.50 CHRONIC MIDLINE LOW BACK PAIN WITHOUT SCIATICA: ICD-10-CM

## 2021-12-20 DIAGNOSIS — G89.29 CHRONIC MIDLINE LOW BACK PAIN WITHOUT SCIATICA: ICD-10-CM

## 2021-12-20 DIAGNOSIS — M25.551 CHRONIC HIP PAIN, BILATERAL: ICD-10-CM

## 2021-12-20 DIAGNOSIS — G89.29 CHRONIC HIP PAIN, BILATERAL: ICD-10-CM

## 2021-12-20 DIAGNOSIS — M25.552 CHRONIC HIP PAIN, BILATERAL: ICD-10-CM

## 2021-12-20 PROCEDURE — 97140 MANUAL THERAPY 1/> REGIONS: CPT | Mod: KX,PO

## 2021-12-22 ENCOUNTER — CLINICAL SUPPORT (OUTPATIENT)
Dept: REHABILITATION | Facility: HOSPITAL | Age: 71
End: 2021-12-22
Payer: MEDICARE

## 2021-12-22 DIAGNOSIS — M25.552 CHRONIC HIP PAIN, BILATERAL: ICD-10-CM

## 2021-12-22 DIAGNOSIS — G89.29 CHRONIC HIP PAIN, BILATERAL: ICD-10-CM

## 2021-12-22 DIAGNOSIS — M54.50 CHRONIC MIDLINE LOW BACK PAIN WITHOUT SCIATICA: ICD-10-CM

## 2021-12-22 DIAGNOSIS — M25.551 CHRONIC HIP PAIN, BILATERAL: ICD-10-CM

## 2021-12-22 DIAGNOSIS — G89.29 CHRONIC MIDLINE LOW BACK PAIN WITHOUT SCIATICA: ICD-10-CM

## 2021-12-22 DIAGNOSIS — R26.9 GAIT ABNORMALITY: ICD-10-CM

## 2021-12-22 PROCEDURE — 97140 MANUAL THERAPY 1/> REGIONS: CPT | Mod: KX,PO

## 2021-12-22 PROCEDURE — 97110 THERAPEUTIC EXERCISES: CPT | Mod: KX,PO

## 2022-01-07 ENCOUNTER — PATIENT MESSAGE (OUTPATIENT)
Dept: REHABILITATION | Facility: HOSPITAL | Age: 72
End: 2022-01-07

## 2022-01-12 ENCOUNTER — CLINICAL SUPPORT (OUTPATIENT)
Dept: REHABILITATION | Facility: HOSPITAL | Age: 72
End: 2022-01-12
Payer: MEDICARE

## 2022-01-12 DIAGNOSIS — R29.898 DECREASED STRENGTH OF TRUNK AND BACK: ICD-10-CM

## 2022-01-12 DIAGNOSIS — M25.69 DECREASED ROM OF TRUNK AND BACK: ICD-10-CM

## 2022-01-12 DIAGNOSIS — G89.29 CHRONIC MIDLINE LOW BACK PAIN WITHOUT SCIATICA: ICD-10-CM

## 2022-01-12 DIAGNOSIS — M54.50 CHRONIC MIDLINE LOW BACK PAIN WITHOUT SCIATICA: ICD-10-CM

## 2022-01-12 PROCEDURE — 97161 PT EVAL LOW COMPLEX 20 MIN: CPT | Mod: PO | Performed by: PHYSICAL THERAPIST

## 2022-01-12 PROCEDURE — 97110 THERAPEUTIC EXERCISES: CPT | Mod: PO | Performed by: PHYSICAL THERAPIST

## 2022-01-12 NOTE — PLAN OF CARE
" [facility] - PHYSICAL THERAPY EVALUATION     Name: Julee Bee  Clinic Number: [id number]    Therapy Diagnosis:   Encounter Diagnoses   Name Primary?    Decreased ROM of trunk and back     Decreased strength of trunk and back     Chronic midline low back pain without sciatica      Physician: Jesse Mejía MD    Physician Orders: PT Eval and Treat   Medical Diagnosis from Referral: Low back pain  Evaluation Date: 1/12/2022  Authorization Period Expiration: 5/8/22  Plan of Care Expiration: 4/12/22  Reassessment Due: 10th visit  Visit # / Visits authorized: 12/ 20 (transfer to Healthy [facility] program 1/12/21)  Healthy Back visit 1    Time In: 7:00AM  Time Out: 8:30AM  Total Billable Time: 90 minutes    Precautions: Standard and Diabetes    Pattern of pain determined: pattern 1 PEP      Subjective   Date of onset: 1/18/21  Date of surgery : 1/18/21  History of current condition - Julee reports: long standing history of back and right LE sciatic pain. She underwent lumbar surgery on 1/18/21 and has been in PT for most of the year. Currently she is experiencing pain in left lumbar region which worsens with walking long distances. She states she feels OK with sitting, but pain increases with bending and lifting and housework activities. She continues to have a sensation of "popping" in her back. 2 days ago she fell going down a ramp and had great difficulty getting back up. She landed on her left knee on pavement and her face fell forward into the grass. Her LBP is worse today, but her knee feels fine. She feels that her legs are weak and she has not regained her strength since surgery. She is being transitioned today from traditional physical therapy to our Healthy [facility] program for progressive strengthening of core and extremities, stretching,cardiovascular exercise and instruction in proper body mechanics.     Medical History:   Past Medical History:   Diagnosis Date    Back pain     Colon polyp  "    GERD (gastroesophageal reflux disease)     H/O bronchitis     H/O: pneumonia     Hearing aid worn     Hyperlipidemia     Hypertension     IBS (irritable bowel syndrome)        Surgical History:   Julee Bee  has a past surgical history that includes Cholecystectomy (2014); Umbilical hernia repair; Hysterectomy (1978); Tonsillectomy; Breast cyst excision (Left, 1990/ 2005); Breast biopsy (Right); Excision heel spur excision; Finger surgery; Colonoscopy (2011); Colonoscopy (08/16/2010); Upper gastrointestinal endoscopy (2010); Surgical removal of Manrique's neuroma; Toe Surgery; Transforaminal epidural injection of steroid (Bilateral, 1/15/2020); Colonoscopy (02/19/2020); Colonoscopy (N/A, 2/19/2020); Excision of bursa of hip (Right); Eye surgery; Cataract extraction, extracapsular w/ intraocular lens implant (Bilateral); Removal of foreign body from head (N/A, 9/11/2020); Closure of wound (N/A, 9/11/2020); and Back surgery (01/18/2021).    Medications:   Julee has a current medication list which includes the following prescription(s): accu-chek fastclix lancet drum, acyclovir, b complex vitamins, biotin, blood sugar diagnostic, cetirizine, co-enzyme q-10, diclofenac sodium, dicyclomine, doxycycline, hydrocortisone, lisinopril-hydrochlorothiazide, metformin, multivitamin, ondansetron, pantoprazole, pramipexole, and rosuvastatin, and the following Facility-Administered Medications: lactated ringers and lactated ringers.    Allergies:   Review of patient's allergies indicates:   Allergen Reactions    Cefdinir Other (See Comments)    Bactrim [sulfamethoxazole-trimethoprim] Rash    Darvon [propoxyphene] Nausea Only    Feldene [piroxicam] Hives    Tramadol Nausea Only        Imaging, Xrays and MRI in Epic are from 2019, prior to surgery     Prior Therapy: yes  Prior Treatment: injections prior to surgery, surgery 1/18/21  Social History:  lives with their spouse  Occupation: retired  Leisure: sewing       Prior Level of Function: able to bend and lift without difficulty  Current Level of Function: weakness with ADLs, cannot get up from fall, pain with bending, lifting and walking long distances.  DME owned/used: no        Pain:  Current 5/10, worst 9/10, best 1/10   Location: bilateral back , worse on left  Description: Sharp  Aggravating Factors: Bending and Lifting  Easing Factors: tylenol arthritic med, heat  Disturbed Sleep: at times        Pattern of pain questions:  1.  Where is your pain the worst? Left low back  2.  Is your pain constant or intermittent? constant  3.  Does bending forward make your typical pain worse? yes  4.  Since the start of your back pain, has there been a change in your bowel or bladder? constipated  5.  What can't you do now that you use to be able to do? Bowl, walk long distance      Pts goals: no pain      Red Flag Screening:   Cough  Sneeze  Strain: (--)  Bladder/ bowel: (--)  Falls: (+)  Night pain: (--)  Unexplained weight loss: (--)  General health: HTN, diabetes, GERD    OBJECTIVE     Postural examination/scapula alignment: Rounded shoulder and Decreased lordosis  Joint integrity: Firm end feeling- lumbar fusion  Skin integrity: small lumbar incisional scar  Edema: no  Sitting: flexed  Standing: decreased lordosis  Correction of posture: better with lumbar roll    MOVEMENT LOSS    ROM Loss   Flexion minimal loss   Extension major loss   Side glide Right moderate loss   Side glide Left moderate loss   Rotation Right minimal loss   Rotation Left minimal loss     Lower Extremity Strength  Right LE  Left LE    Hip flexion: 4/5 Hip flexion: 4/5   Hip extension:  4/5 Hip extension: 4/5   Hip abduction: 4/5 Hip abduction: 4/5   Hip adduction:  5/5 Hip adduction:  5/5   Hip Internal rotation   4+/5 Hip Internal rotation 4+/5   Knee Flexion 5/5 Knee Flexion 5/5   Knee Extension 4/5 Knee Extension 4/5   Ankle dorsiflexion: 5/5 Ankle dorsiflexion: 5/5   Ankle plantarflexion: 5/5  Ankle plantarflexion:        GAIT:  Assistive Device used: none  Level of Assistance: independent  Patient displays the following gait deviations:  decreased step length.     Special Tests:   Test Name  Test Result   Prone Instability Test (--)   SI Joint Provocation Test (--)   Straight Leg Raise (--)   Neural Tension Test (--)   Crossed Straight Leg Raise (--)   Walking on toes (--)   Walking on heels  (--)       NEUROLOGICAL SCREENING     Sensory deficit: no    Reflexes:    Left Right   Patella Tendon 2+ 2+   Achilles Tendon 2+ 2+   Babinski  (--) (--)   Clonus (--) (--)     REPEATED TEST MOVEMENTS:    Baseline symptoms:  Repeated Flexion in Standing worse   Repeated Extension in Standing no effect   Repeated Flexion in lying better   Repeated Extension in lying  no effect       STATIC TESTS and other movements:   Baseline symptoms:  Prone lie better   Prone lie on elbows better   Sustained flexion worse   Sitting slouched  worse   Sitting erect better   Standing slouched no effect   Standing erect  better   Lying prone in extension  better   Long sitting   worse       Baseline Isometric Testing on Med X equipment: Testing administered by PT  Date of testin22  ROM 3-36 deg   Max Peak Torque 93    Min Peak Torque 42    Flex/Ext Ratio 2.2:1   % below normative data 32         Limitation/Restriction for FOTO lumbar Survey    Therapist reviewed FOTO scores for Julee Bee on 2022.   FOTO documents entered into [employer] - see Media section.    Limitation Score: 58%       [facility] 2022   Visit Number 1   VAS Pain Rating 4   Extension in Lying 20   Extension in Standing 10   Lumbar Extension Seat Pad 2   Femur Restraint 5   Top Dead Center 24   Counterweight 284   Lumbar Flexion 36   Lumbar Extension 3   Lumbar Peak Torque 93   Min Torque 42   Test Percent Below Normative Data 32   Ice - Z Lie (in min.) 10           Treatment   Treatment Time In: 7:45AM  Treatment Time Out:  8:30AM  Total Treatment time separate from Evaluation: 45 minutes      Julee received therapeutic exercises to develop/improve posture, lumbar/cervical ROM, strength and muscular endurance for 35 minutes including the following exercises:     Med x dynamic exercise and baseline IM test  Prone lying 2 min  Press ups 2/10  Bridging 2/10  Standing extension x10        Written Home Exercises Provided: yes.  Exercises were reviewed and Julee was able to demonstrate them prior to the end of the session.  Julee demonstrated good  understanding of the education provided.     See EMR under Patient Instructions for exercises provided 1/12/2022.      Education provided:   - Patient received education regarding proper posture and body mechanics.  Patient was given top [facility] Visit 1 handouts which discuss what to expect in therapy, the purpose and opportunity for health coaching, the program,  wellness when discharged from therapy, back education and care specifically for posture seated, standing, lifting correctly, components of exercise, importance of nutrition and hydration, and importance of sleep.   Information on lumbar rolls provided.  - Anaya roll tried, recommended, and purchase information was provided.    - Patient received a handout regarding anticipated muscular soreness following the isometric test and strategies for management were reviewed with patient including stretching, using ice and scheduled rest.   - Patient received education on the Healthy [facility] program, purpose of the isometric test, progression of back strengthening as well as wellness approach and systemic strengthening.  Details of the program were discussed.  Reviewed that patient should feel support/pressure from med ex restraints but no pain or discomfort and patient expressed understanding.    Julee received cold pack for 10 minutes to low back.    Assessment   Julee is a 71 y.o. female referred to [facility] with a  medical diagnosis of low back pain. Pt presents with chronic low back pain. She has received extensive physical therapy over the last year since her lumbar surgery and is being transferred to the Healthy [facility] program for continued stretching and strengthening with anticipation to transition to wellness. She presents with slightly flexed posture with decreased lumbar lordosis. She has limited lumbar ROM especially in extension. Pain worsens with flexion activities and improves with extension. She would benefit from comprehensive back and extremity strengthening, stretching, instruction in posture and body mechanics.    Pain Pattern: Pattern 1 PEP       Pt prognosis is Good.   Pt will benefit from skilled outpatient Physical Therapy to address the deficits stated above and in the chart below, provide pt/family education, and to maximize pt's level of independence. Based on the above history and physical examination an active physical therapy program is recommended.  Pt will continue to benefit from skilled outpatient physical therapy to address the deficits listed below in the chart, provide pt/family education and to maximize pt's level of independence in the home and community environment. .       Plan of care discussed with patient: Yes  Pt's spiritual, cultural and educational needs considered and patient is agreeable to the plan of care and goals as stated below:     Anticipated Barriers for therapy: none    PT Evaluation Completed? Yes    Medical necessity is demonstrated by the following problem list.    Pt presents with the following impairments:     History  Co-morbidities and personal factors that may impact the plan of care Co-morbidities:   diabetes, high BMI, HTN and prior lumbar surgery, osteoporosis    Personal Factors:   no deficits     low   Examination  Body Structures and Functions, activity limitations and participation restrictions that may impact the plan of care Body Regions:   back  lower  extremities  trunk    Body Systems:    gross symmetry  ROM  strength  gross coordinated movement  balance  gait  transfers  transitions  motor control    Participation Restrictions:   none    Activity limitations:   Learning and applying knowledge  no deficits    General Tasks and Commands  no deficits    Communication  no deficits    Mobility  lifting and carrying objects  walking    Self care  no deficits    Domestic Life  shopping  cooking  doing house work (cleaning house, washing dishes, laundry)    Interactions/Relationships  no deficits    Life Areas  no deficits    Community and Social Life  community life  recreation and leisure         low   Clinical Presentation stable and uncomplicated low   Decision Making/ Complexity Score: low       GOALS: Pt is in agreement with the following goals.    Short term goals:  6 weeks or 10 visits   1.  Pt will demonstrate increased lumbar ROM by at least 3 degrees from the initial ROM value with improvements noted in functional ROM and ability to perform ADLs.  (approp and ongoing)  2.  Pt will demonstrate increased MedX average isometric strength value  by 20% from initial test resulting in improved ability to perform bending, lifting, and carrying activities safely, confidently.  (approp and ongoing)  3.  Patient report a reduction in worst pain score by 1-2 points for improved tolerance for bending and lifting.  (approp and ongoing)  4.  Pt able to perform HEP correctly with minimal cueing or supervision from therapist to encourage independent management of symptoms. (approp and ongoing)      Long term goals: 10 weeks or 20 visits   1. Pt will demonstrate increased lumbar ROM by at least 6 degrees from initial ROM value, resulting in improved ability to perform functional fwd bending while standing and sitting. (approp and ongoing)  2. Pt will demonstrate increased MedX average isometric strength value  by 30% from initial test resulting in improved ability to perform  "bending, lifting, and carrying activities safely, confidently.  (approp and ongoing)  3. Pt to demonstrate ability to independently control and reduce their pain through posture positioning and mechanical movements throughout a typical day.  (approp and ongoing)  4.  Pt will demonstrate reduced pain and improved functional outcomes as reported on the FOTO by reaching a limitation score of < or = 50% or less in order to demonstrate subjective improvement in pt's condition.    (approp and ongoing)  5. Pt will demonstrate independence with the HEP at discharge  (approp and ongoing)  6.  Pt will have no pain with daily ADLs and will increase overall strength(patient goal)  (approp and ongoing)      Plan   Outpatient physical therapy 2x week for 10 weeks or 20 visits to include the following:   - Patient education  - Therapeutic exercise  - Manual therapy  - Performance testing   - Neuromuscular Re-education  - Therapeutic activity   - Modalities    Pt may be seen by PTA as part of the rehabilitation team.     Therapist: Kelsi Ulloa, PT  1/12/2022    "I certify the need for these services furnished under this plan of treatment and while under my care."    ____________________________________  Physician/Referring Practitioner    _______________  Date of Signature          "

## 2022-01-14 ENCOUNTER — CLINICAL SUPPORT (OUTPATIENT)
Dept: REHABILITATION | Facility: HOSPITAL | Age: 72
End: 2022-01-14
Payer: MEDICARE

## 2022-01-14 DIAGNOSIS — G89.29 CHRONIC MIDLINE LOW BACK PAIN WITHOUT SCIATICA: ICD-10-CM

## 2022-01-14 DIAGNOSIS — M54.50 CHRONIC MIDLINE LOW BACK PAIN WITHOUT SCIATICA: ICD-10-CM

## 2022-01-14 DIAGNOSIS — M25.69 DECREASED ROM OF TRUNK AND BACK: Primary | ICD-10-CM

## 2022-01-14 DIAGNOSIS — R29.898 DECREASED STRENGTH OF TRUNK AND BACK: ICD-10-CM

## 2022-01-14 PROCEDURE — 97110 THERAPEUTIC EXERCISES: CPT | Mod: PO,CQ

## 2022-01-14 NOTE — PROGRESS NOTES
[facility]      Name: Julee Bee  Clinic Number: [id number]    Therapy Diagnosis:   Encounter Diagnoses   Name Primary?    Decreased ROM of trunk and back Yes    Chronic midline low back pain without sciatica     Decreased strength of trunk and back      Physician: Jesse Mejía MD    Visit Date: 2022    Physician: Jesse Mejía MD     Physician Orders: PT Eval and Treat   Medical Diagnosis from Referral: Low back pain  Evaluation Date: 2022  Authorization Period Expiration: 22  Plan of Care Expiration: 22  Reassessment Due: 10th visit  Visit # / Visits authorized:  (transfer to [facility] program 21)  [facility] visit 2     Time In: 10:50am  Time Out: 12:00pm  Total Billable Time: 50 minutes     Precautions: Standard and Diabetes     Pattern of pain determined: pattern 1 PEP       Subjective   Julee reports always having LB pain in the am and as the day goes on she feels better. .      Patient reports tolerating previous visit well with no pain.   Patient reports their pain to be 4/10 on a 0-10 scale with 0 being no pain and 10 being the worst pain imaginable.  Pain Location: L LB      Occupation: retired  Leisure: sewing      Pts goals: no pain    Objective     Baseline Isometric Testing on Med X equipment: Testing administered by PT  Date of testin22  ROM 3-36 deg   Max Peak Torque 93    Min Peak Torque 42    Flex/Ext Ratio 2.2:1   % below normative data 32          Treatment    Pt was instructed in and performed the following:     Julee received therapeutic exercises to develop/improved posture, cardiovascular endurance, muscular endurance, lumbar/cervical ROM, strength and muscular endurance for 70 minutes including the following exercises:         Prone lying 2 min  Press ups on elbows  2/10  Bridging 2/10  Standing extension x10  [facility] 2022   Visit Number 2   VAS Pain Rating 2   Recumbent Bike  Seat Pos. 10   Extension in Lying 20   Extension in Standing 10   Lumbar Extension Seat Pad -   Femur Restraint -   Top Dead Center -   Counterweight -   Lumbar Flexion -   Lumbar Extension -   Lumbar Peak Torque -   Min Torque -   Test Percent Below Normative Data -   Lumbar Weight 45   Repetitions 15   Rating of Perceived Exertion 3   Ice - Z Lie (in min.) 10       Peripheral muscle strengthening which included 1 set of 15-20 repetitions at a slow, controlled 10-13 second per rep pace focused on strengthening supporting musculature for improved body mechanics and functional mobility.  Pt and therapist focused on proper form during treatment to ensure optimal strengthening of each targeted muscle group.  Machines were utilized including torso rotation, leg extension, leg curl, chest press, upright row. Tricep extension, bicep curl, leg press, and hip abduction added visit 3    Julee received the following manual therapy techniques: n/a were applied to the: n/a for n/a minutes.         Home Exercises Provided and Patient Education Provided   Home exercises include:  Prone lying 2 min  Press ups on elbows  2/10  Bridging 2/10  Standing extension x10    Cardio program:  Lifting education date:  Lumbar roll:     Education provided:   - Educated pt on the importance of daily stretch to increase the benefit of program and positive results.     Written Home Exercises Provided: yes.  Exercises were reviewed and Julee was able to demonstrate them prior to the end of the session.  Julee demonstrated good  understanding of the education provided.     See EMR under Patient Instructions for exercises provided prior visit.          Assessment   Pt with minimal LBP that did decrease a little during and post session. Reviewed HEP with mod vc for tech. Reviewed pt do EIS so they can do when prolonged sitting or cleaning house and does flexion.  Pt understood. Pt demo well. Pt tolerated lumb ar medx machine with starting weight 50%  of pts max peak torque with no c/o pain. Pt tolerated the medx machines well to the LE machines with no c/o increased LB pain or any limb pain. Educated pt on the importance of daily stretch to increase the benefit of program and positive results.  Patient is making good progress towards established goals.    Pt will continue to benefit from skilled outpatient physical therapy to address the deficits stated in the impairment chart, provide pt/family education and to maximize pt's level of independence in the home and community environment.     Anticipated Barriers for therapy: none  Pt's spiritual, cultural and educational needs considered and pt agreeable to plan of care and goals as stated below:       Goals:     GOALS: Pt is in agreement with the following goals.     Short term goals:  6 weeks or 10 visits   1.  Pt will demonstrate increased lumbar ROM by at least 3 degrees from the initial ROM value with improvements noted in functional ROM and ability to perform ADLs.  (approp and ongoing)  2.  Pt will demonstrate increased MedX average isometric strength value  by 20% from initial test resulting in improved ability to perform bending, lifting, and carrying activities safely, confidently.  (approp and ongoing)  3.  Patient report a reduction in worst pain score by 1-2 points for improved tolerance for bending and lifting.  (approp and ongoing)  4.  Pt able to perform HEP correctly with minimal cueing or supervision from therapist to encourage independent management of symptoms. (approp and ongoing)        Long term goals: 10 weeks or 20 visits   1. Pt will demonstrate increased lumbar ROM by at least 6 degrees from initial ROM value, resulting in improved ability to perform functional fwd bending while standing and sitting. (approp and ongoing)  2. Pt will demonstrate increased MedX average isometric strength value  by 30% from initial test resulting in improved ability to perform bending, lifting, and carrying  activities safely, confidently.  (approp and ongoing)  3. Pt to demonstrate ability to independently control and reduce their pain through posture positioning and mechanical movements throughout a typical day.  (approp and ongoing)  4.  Pt will demonstrate reduced pain and improved functional outcomes as reported on the FOTO by reaching a limitation score of < or = 50% or less in order to demonstrate subjective improvement in pt's condition.    (approp and ongoing)  5. Pt will demonstrate independence with the HEP at discharge  (approp and ongoing)  6.  Pt will have no pain with daily ADLs and will increase overall strength(patient goal)  (approp and ongoing)       Plan   Continue with established Plan of Care towards established PT goals.

## 2022-01-18 ENCOUNTER — CLINICAL SUPPORT (OUTPATIENT)
Dept: REHABILITATION | Facility: HOSPITAL | Age: 72
End: 2022-01-18
Payer: MEDICARE

## 2022-01-18 DIAGNOSIS — M25.69 DECREASED ROM OF TRUNK AND BACK: Primary | ICD-10-CM

## 2022-01-18 DIAGNOSIS — M54.50 CHRONIC MIDLINE LOW BACK PAIN WITHOUT SCIATICA: ICD-10-CM

## 2022-01-18 DIAGNOSIS — G89.29 CHRONIC MIDLINE LOW BACK PAIN WITHOUT SCIATICA: ICD-10-CM

## 2022-01-18 DIAGNOSIS — R29.898 DECREASED STRENGTH OF TRUNK AND BACK: ICD-10-CM

## 2022-01-18 PROCEDURE — 97110 THERAPEUTIC EXERCISES: CPT | Mod: PO,CQ

## 2022-01-18 NOTE — PROGRESS NOTES
[facility]      Name: Julee Bee  Clinic Number: [id number]    Therapy Diagnosis:   Encounter Diagnoses   Name Primary?    Decreased ROM of trunk and back Yes    Chronic midline low back pain without sciatica     Decreased strength of trunk and back      Physician: Jesse Mejía MD    Visit Date: 2022    Physician: Jesse Mejía MD     Physician Orders: PT Eval and Treat   Medical Diagnosis from Referral: Low back pain  Evaluation Date: 2022  Authorization Period Expiration: 22  Plan of Care Expiration: 22  Reassessment Due: 10th visit  Visit # / Visits authorized:  (transfer to [facility] program 21)  [facility] visit 3     Time In: 1:45  Time Out: 2:45pm  Total Billable Time: 50 minutes     Precautions: Standard and Diabetes     Pattern of pain determined: pattern 1 PEP       Subjective   Julee stated she noticed that when she is constipated she has more LBP. When she has a BM her back feels better.     Patient reports tolerating previous visit well with no pain.   Patient reports their pain to be 4/10 on a 0-10 scale with 0 being no pain and 10 being the worst pain imaginable.  Pain Location: L LB      Occupation: retired  Leisure: sewing      Pts goals: no pain    Objective     Baseline Isometric Testing on Med X equipment: Testing administered by PT  Date of testin22  ROM 3-36 deg   Max Peak Torque 93    Min Peak Torque 42    Flex/Ext Ratio 2.2:1   % below normative data 32          Treatment    Pt was instructed in and performed the following:     Julee received therapeutic exercises to develop/improved posture, cardiovascular endurance, muscular endurance, lumbar/cervical ROM, strength and muscular endurance for 60 minutes including the following exercises:         Prone lying 2 min  Press ups on elbows  2/10  Bridging 2/10  Standing extension x10  [facility] 2022   Visit Number 3   VAS Pain Rating  -   Recumbent Bike Seat Pos. 10   Extension in Lying 20   Extension in Standing -   Lumbar Extension Seat Pad -   Femur Restraint -   Top Dead Center -   Counterweight -   Lumbar Flexion -   Lumbar Extension -   Lumbar Peak Torque -   Min Torque -   Test Percent Below Normative Data -   Lumbar Weight 45   Repetitions 18   Rating of Perceived Exertion 3   Ice - Z Lie (in min.) 10         Peripheral muscle strengthening which included 1 set of 15-20 repetitions at a slow, controlled 10-13 second per rep pace focused on strengthening supporting musculature for improved body mechanics and functional mobility.  Pt and therapist focused on proper form during treatment to ensure optimal strengthening of each targeted muscle group.  Machines were utilized including torso rotation, leg extension, leg curl, chest press, upright row. Tricep extension, bicep curl, leg press, and hip abduction added visit 3    Julee received the following manual therapy techniques: n/a were applied to the: n/a for n/a minutes.         Home Exercises Provided and Patient Education Provided   Home exercises include:  Prone lying 2 min  Press ups on elbows  2/10  Bridging 2/10  Standing extension x10    Cardio program:  Lifting education date:  Lumbar roll:     Education provided:   - Educated pt on the importance of daily stretch to increase the benefit of program and positive results.     Written Home Exercises Provided: yes.  Exercises were reviewed and Julee was able to demonstrate them prior to the end of the session.  Julee demonstrated good  understanding of the education provided.     See EMR under Patient Instructions for exercises provided prior visit.          Assessment   Pt with minimal LBP that did decrease a little during and post session. Reviewed HEP with mod vc for tech. Reviewed pt do EIS so they can do when prolonged sitting or cleaning house and does flexion.  Pt understood. Pt demo well. Pt tolerated lumbar machine with 18  reps and RPE 3.  Pt tolerated the all the medx machines well wiith no c/o increased LB pain or any limb pain. Educated pt on the importance of daily stretch to increase the benefit of program and positive results.  Patient is making good progress towards established goals.    Pt will continue to benefit from skilled outpatient physical therapy to address the deficits stated in the impairment chart, provide pt/family education and to maximize pt's level of independence in the home and community environment.     Anticipated Barriers for therapy: none  Pt's spiritual, cultural and educational needs considered and pt agreeable to plan of care and goals as stated below:       Goals:     GOALS: Pt is in agreement with the following goals.     Short term goals:  6 weeks or 10 visits   1.  Pt will demonstrate increased lumbar ROM by at least 3 degrees from the initial ROM value with improvements noted in functional ROM and ability to perform ADLs.  (approp and ongoing)  2.  Pt will demonstrate increased MedX average isometric strength value  by 20% from initial test resulting in improved ability to perform bending, lifting, and carrying activities safely, confidently.  (approp and ongoing)  3.  Patient report a reduction in worst pain score by 1-2 points for improved tolerance for bending and lifting.  (approp and ongoing)  4.  Pt able to perform HEP correctly with minimal cueing or supervision from therapist to encourage independent management of symptoms. (approp and ongoing)        Long term goals: 10 weeks or 20 visits   1. Pt will demonstrate increased lumbar ROM by at least 6 degrees from initial ROM value, resulting in improved ability to perform functional fwd bending while standing and sitting. (approp and ongoing)  2. Pt will demonstrate increased MedX average isometric strength value  by 30% from initial test resulting in improved ability to perform bending, lifting, and carrying activities safely, confidently.   (approp and ongoing)  3. Pt to demonstrate ability to independently control and reduce their pain through posture positioning and mechanical movements throughout a typical day.  (approp and ongoing)  4.  Pt will demonstrate reduced pain and improved functional outcomes as reported on the FOTO by reaching a limitation score of < or = 50% or less in order to demonstrate subjective improvement in pt's condition.    (approp and ongoing)  5. Pt will demonstrate independence with the HEP at discharge  (approp and ongoing)  6.  Pt will have no pain with daily ADLs and will increase overall strength(patient goal)  (approp and ongoing)       Plan   Continue with established Plan of Care towards established PT goals.

## 2022-01-20 ENCOUNTER — PATIENT MESSAGE (OUTPATIENT)
Dept: GASTROENTEROLOGY | Facility: CLINIC | Age: 72
End: 2022-01-20
Payer: MEDICARE

## 2022-01-21 ENCOUNTER — CLINICAL SUPPORT (OUTPATIENT)
Dept: REHABILITATION | Facility: HOSPITAL | Age: 72
End: 2022-01-21
Payer: MEDICARE

## 2022-01-21 DIAGNOSIS — M54.50 CHRONIC MIDLINE LOW BACK PAIN WITHOUT SCIATICA: ICD-10-CM

## 2022-01-21 DIAGNOSIS — G89.29 CHRONIC MIDLINE LOW BACK PAIN WITHOUT SCIATICA: ICD-10-CM

## 2022-01-21 DIAGNOSIS — M25.69 DECREASED ROM OF TRUNK AND BACK: Primary | ICD-10-CM

## 2022-01-21 DIAGNOSIS — R29.898 DECREASED STRENGTH OF TRUNK AND BACK: ICD-10-CM

## 2022-01-21 PROCEDURE — 97110 THERAPEUTIC EXERCISES: CPT | Mod: PO,CQ

## 2022-01-21 NOTE — TELEPHONE ENCOUNTER
Recommend she add OTC Miralax once daily to her bowel regimen. She can take a couple doses today to get things moving.

## 2022-01-21 NOTE — PROGRESS NOTES
[facility]      Name: Julee Bee  Clinic Number: [id number]    Therapy Diagnosis:   Encounter Diagnoses   Name Primary?    Decreased ROM of trunk and back Yes    Chronic midline low back pain without sciatica     Decreased strength of trunk and back      Physician: Jesse Mejía MD    Visit Date: 2022    Physician: Jesse Mejía MD     Physician Orders: PT Eval and Treat   Medical Diagnosis from Referral: Low back pain  Evaluation Date: 2022  Authorization Period Expiration: 22  Plan of Care Expiration: 22  Reassessment Due: 10th visit  Visit # / Visits authorized:  (transfer to [facility] program 21)  [facility] visit 3     Time In: 2:30  Time Out: 3:25pm  Total Billable Time: 50 minutes     Precautions: Standard and Diabetes     Pattern of pain determined: pattern 1 PEP      Subjective   Julee stated she noticed that when she is constipated she has more LBP. When she has a BM her back feels better. She is going to the doctor nexk week. She does feel that the stretches help decrease pain, but her constipation makes it worse.    Patient reports tolerating previous visit well with no pain.   Patient reports their pain to be 2/10 on a 0-10 scale with 0 being no pain and 10 being the worst pain imaginable.  Pain Location: L LB      Occupation: retired  Leisure: sewing      Pts goals: no pain    Objective     Baseline Isometric Testing on Med X equipment: Testing administered by PT  Date of testin22  ROM 3-36 deg   Max Peak Torque 93    Min Peak Torque 42    Flex/Ext Ratio 2.2:1   % below normative data 32          Treatment    Pt was instructed in and performed the following:     Julee received therapeutic exercises to develop/improved posture, cardiovascular endurance, muscular endurance, lumbar/cervical ROM, strength and muscular endurance for 55 minutes including the following exercises:         Prone lying 2 min  Press  ups on elbows  2/10  Bridging 2/10  Standing extension x10  [facility] 1/21/2022   Visit Number 4   VAS Pain Rating 2   Recumbent Bike Seat Pos. 10   Extension in Lying 20   Extension in Standing -   Lumbar Extension Seat Pad -   Femur Restraint -   Top Dead Center -   Counterweight -   Lumbar Flexion -   Lumbar Extension -   Lumbar Peak Torque -   Min Torque -   Test Percent Below Normative Data -   Lumbar Weight 45   Repetitions 20   Rating of Perceived Exertion 3   Ice - Z Lie (in min.) 10           Peripheral muscle strengthening which included 1 set of 15-20 repetitions at a slow, controlled 10-13 second per rep pace focused on strengthening supporting musculature for improved body mechanics and functional mobility.  Pt and therapist focused on proper form during treatment to ensure optimal strengthening of each targeted muscle group.  Machines were utilized including torso rotation, leg extension, leg curl, chest press, upright row. Tricep extension, bicep curl, leg press, and hip abduction added visit 3    Julee received the following manual therapy techniques: n/a were applied to the: n/a for n/a minutes.         Home Exercises Provided and Patient Education Provided   Home exercises include:  Prone lying 2 min  Press ups on elbows  2/10  Bridging 2/10  Standing extension x10    Cardio program:  Lifting education date:  Lumbar roll:     Education provided:   - Educated pt on the importance of daily stretch to increase the benefit of program and positive results.     Written Home Exercises Provided: yes.  Exercises were reviewed and Julee was able to demonstrate them prior to the end of the session.  Julee demonstrated good  understanding of the education provided.     See EMR under Patient Instructions for exercises provided prior visit.      Assessment:    Pt tolerated session well with no c/o pain. Pt performed 20 reps on the lumbar machine with same weight and RPE 3.  Pt tolerated the all the  medx machines well wisadaf a progress in weight n no c/o increased LB pain or any limb pain. Pt demo her HEP well. Patient is making good progress towards established goals.    Pt will continue to benefit from skilled outpatient physical therapy to address the deficits stated in the impairment chart, provide pt/family education and to maximize pt's level of independence in the home and community environment.     Anticipated Barriers for therapy: none  Pt's spiritual, cultural and educational needs considered and pt agreeable to plan of care and goals as stated below:       Goals:     GOALS: Pt is in agreement with the following goals.     Short term goals:  6 weeks or 10 visits   1.  Pt will demonstrate increased lumbar ROM by at least 3 degrees from the initial ROM value with improvements noted in functional ROM and ability to perform ADLs.  (approp and ongoing)  2.  Pt will demonstrate increased MedX average isometric strength value  by 20% from initial test resulting in improved ability to perform bending, lifting, and carrying activities safely, confidently.  (approp and ongoing)  3.  Patient report a reduction in worst pain score by 1-2 points for improved tolerance for bending and lifting.  (approp and ongoing)  4.  Pt able to perform HEP correctly with minimal cueing or supervision from therapist to encourage independent management of symptoms. (approp and ongoing)        Long term goals: 10 weeks or 20 visits   1. Pt will demonstrate increased lumbar ROM by at least 6 degrees from initial ROM value, resulting in improved ability to perform functional fwd bending while standing and sitting. (approp and ongoing)  2. Pt will demonstrate increased MedX average isometric strength value  by 30% from initial test resulting in improved ability to perform bending, lifting, and carrying activities safely, confidently.  (approp and ongoing)  3. Pt to demonstrate ability to independently control and reduce their pain  through posture positioning and mechanical movements throughout a typical day.  (approp and ongoing)  4.  Pt will demonstrate reduced pain and improved functional outcomes as reported on the FOTO by reaching a limitation score of < or = 50% or less in order to demonstrate subjective improvement in pt's condition.    (approp and ongoing)  5. Pt will demonstrate independence with the HEP at discharge  (approp and ongoing)  6.  Pt will have no pain with daily ADLs and will increase overall strength(patient goal)  (approp and ongoing)       Plan   Continue with established Plan of Care towards established PT goals.

## 2022-01-25 ENCOUNTER — PATIENT MESSAGE (OUTPATIENT)
Dept: REHABILITATION | Facility: HOSPITAL | Age: 72
End: 2022-01-25
Payer: MEDICARE

## 2022-02-02 ENCOUNTER — PATIENT MESSAGE (OUTPATIENT)
Dept: REHABILITATION | Facility: HOSPITAL | Age: 72
End: 2022-02-02
Payer: MEDICARE

## 2022-02-07 ENCOUNTER — CLINICAL SUPPORT (OUTPATIENT)
Dept: REHABILITATION | Facility: HOSPITAL | Age: 72
End: 2022-02-07
Payer: MEDICARE

## 2022-02-07 DIAGNOSIS — M54.50 CHRONIC MIDLINE LOW BACK PAIN WITHOUT SCIATICA: ICD-10-CM

## 2022-02-07 DIAGNOSIS — M25.69 DECREASED ROM OF TRUNK AND BACK: Primary | ICD-10-CM

## 2022-02-07 DIAGNOSIS — R29.898 DECREASED STRENGTH OF TRUNK AND BACK: ICD-10-CM

## 2022-02-07 DIAGNOSIS — G89.29 CHRONIC MIDLINE LOW BACK PAIN WITHOUT SCIATICA: ICD-10-CM

## 2022-02-07 PROCEDURE — 97110 THERAPEUTIC EXERCISES: CPT | Mod: PO,CQ

## 2022-02-07 NOTE — PROGRESS NOTES
[facility]      Name: Julee Bee  Clinic Number: [id number]    Therapy Diagnosis:   Encounter Diagnoses   Name Primary?    Decreased ROM of trunk and back Yes    Chronic midline low back pain without sciatica     Decreased strength of trunk and back      Physician: Jesse Mejía MD    Visit Date: 2022    Physician: Jesse Mejía MD     Physician Orders: PT Eval and Treat   Medical Diagnosis from Referral: Low back pain  Evaluation Date: 2022  Authorization Period Expiration: 22  Plan of Care Expiration: 22  Reassessment Due: 10th visit  Visit # / Visits authorized: 15/ 20 (transfer to [facility] program 21)  [facility] visit 3     Time In: 10:45am  Time Out: 11:50am  Total Billable Time: 55 minutes     Precautions: Standard and Diabetes     Pattern of pain determined: pattern 1 PEP      Subjective   Julee stated her back has been feeling better 2/2 she gotten her BM under control and not as much constipation that gives her LBP. L hip pain is 4/10 today.    Patient reports tolerating previous visit well with no pain.   Patient reports their pain to be 4/10 on a 0-10 scale with 0 being no pain and 10 being the worst pain imaginable.  Pain Location: L LB      Occupation: retired  Leisure: sewing      Pts goals: no pain    Objective     Baseline Isometric Testing on Med X equipment: Testing administered by PT  Date of testin22  ROM 3-36 deg   Max Peak Torque 93    Min Peak Torque 42    Flex/Ext Ratio 2.2:1   % below normative data 32          Treatment    Pt was instructed in and performed the following:     Julee received therapeutic exercises to develop/improved posture, cardiovascular endurance, muscular endurance, lumbar/cervical ROM, strength and muscular endurance for 65 minutes including the following exercises:         Prone lying 2 min  Press ups on elbows  2/10  Bridging 2/10  Standing extension x10  LTR 20x  [facility]  Therapy 2/7/2022   Visit Number 5   VAS Pain Rating 2   Recumbent Bike Seat Pos. 10   Extension in Lying 20   Extension in Standing -   Lumbar Extension Seat Pad -   Test Percent Below Normative Data -   Lumbar Weight 45   Repetitions 15   Rating of Perceived Exertion 3   Ice - Z Lie (in min.) 10         Peripheral muscle strengthening which included 1 set of 15-20 repetitions at a slow, controlled 10-13 second per rep pace focused on strengthening supporting musculature for improved body mechanics and functional mobility.  Pt and therapist focused on proper form during treatment to ensure optimal strengthening of each targeted muscle group.  Machines were utilized including torso rotation, leg extension, leg curl, chest press, upright row. Tricep extension, bicep curl, leg press, and hip abduction added visit 3    Julee received the following manual therapy techniques: n/a were applied to the: n/a for n/a minutes.         Home Exercises Provided and Patient Education Provided   Home exercises include:  Prone lying 2 min  Press ups on elbows  2/10  Bridging 2/10  Standing extension x10    Cardio program:  Lifting education date:  Lumbar roll:     Education provided:   - Educated pt on the importance of daily stretch to increase the benefit of program and positive results.     Written Home Exercises Provided: yes.  Exercises were reviewed and Julee was able to demonstrate them prior to the end of the session.  Julee demonstrated good  understanding of the education provided.     See EMR under Patient Instructions for exercises provided prior visit.      Assessment:    Pt tolerated session well with a decrease in her L hip  pain. Pt exercised on the same weight today due to pt not being here for about 2 weeks from having covid.   Pt tolerated the all the medx machines well wiith a progress in weight no c/o increased LB pain or any limb pain. Pt demo her HEP well. Patient is making good progress towards established  goals.    Pt will continue to benefit from skilled outpatient physical therapy to address the deficits stated in the impairment chart, provide pt/family education and to maximize pt's level of independence in the home and community environment.     Anticipated Barriers for therapy: none  Pt's spiritual, cultural and educational needs considered and pt agreeable to plan of care and goals as stated below:       Goals:     GOALS: Pt is in agreement with the following goals.     Short term goals:  6 weeks or 10 visits   1.  Pt will demonstrate increased lumbar ROM by at least 3 degrees from the initial ROM value with improvements noted in functional ROM and ability to perform ADLs.  (approp and ongoing)  2.  Pt will demonstrate increased MedX average isometric strength value  by 20% from initial test resulting in improved ability to perform bending, lifting, and carrying activities safely, confidently.  (approp and ongoing)  3.  Patient report a reduction in worst pain score by 1-2 points for improved tolerance for bending and lifting.  (approp and ongoing)  4.  Pt able to perform HEP correctly with minimal cueing or supervision from therapist to encourage independent management of symptoms. (approp and ongoing)        Long term goals: 10 weeks or 20 visits   1. Pt will demonstrate increased lumbar ROM by at least 6 degrees from initial ROM value, resulting in improved ability to perform functional fwd bending while standing and sitting. (approp and ongoing)  2. Pt will demonstrate increased MedX average isometric strength value  by 30% from initial test resulting in improved ability to perform bending, lifting, and carrying activities safely, confidently.  (approp and ongoing)  3. Pt to demonstrate ability to independently control and reduce their pain through posture positioning and mechanical movements throughout a typical day.  (approp and ongoing)  4.  Pt will demonstrate reduced pain and improved functional  outcomes as reported on the FOTO by reaching a limitation score of < or = 50% or less in order to demonstrate subjective improvement in pt's condition.    (approp and ongoing)  5. Pt will demonstrate independence with the HEP at discharge  (approp and ongoing)  6.  Pt will have no pain with daily ADLs and will increase overall strength(patient goal)  (approp and ongoing)       Plan   Continue with established Plan of Care towards established PT goals.

## 2022-02-09 ENCOUNTER — CLINICAL SUPPORT (OUTPATIENT)
Dept: REHABILITATION | Facility: HOSPITAL | Age: 72
End: 2022-02-09
Payer: MEDICARE

## 2022-02-09 DIAGNOSIS — M25.69 DECREASED ROM OF TRUNK AND BACK: ICD-10-CM

## 2022-02-09 DIAGNOSIS — M54.50 CHRONIC MIDLINE LOW BACK PAIN WITHOUT SCIATICA: ICD-10-CM

## 2022-02-09 DIAGNOSIS — G89.29 CHRONIC MIDLINE LOW BACK PAIN WITHOUT SCIATICA: ICD-10-CM

## 2022-02-09 DIAGNOSIS — R29.898 DECREASED STRENGTH OF TRUNK AND BACK: ICD-10-CM

## 2022-02-09 PROCEDURE — 97110 THERAPEUTIC EXERCISES: CPT | Mod: PO | Performed by: PHYSICAL THERAPIST

## 2022-02-09 NOTE — PROGRESS NOTES
[facility]      Name: Julee Bee  Clinic Number: [id number]    Therapy Diagnosis:   Encounter Diagnoses   Name Primary?    Decreased ROM of trunk and back     Decreased strength of trunk and back     Chronic midline low back pain without sciatica      Physician: Jesse Mejía MD    Visit Date: 2022    Physician: Jesse Mejía MD     Physician Orders: PT Eval and Treat   Medical Diagnosis from Referral: Low back pain  Evaluation Date: 2022  Authorization Period Expiration: 22  Plan of Care Expiration: 22  Reassessment Due: 10th visit  Visit # / Visits authorized:  (transfer to [facility] program 21)  [facility] visit 6     Time In: 10:45am  Time Out: 11:30am  Total Billable Time: 45 minutes     Precautions: Standard and Diabetes     Pattern of pain determined: pattern 1 PEP      Subjective   Julee stated back pain has been better especially since not being constipated. She is sore with stretches, but feels she is doing well with [facility] program so far.    Patient reports tolerating previous visit well with no pain.   Patient reports their pain to be 2/10 on a 0-10 scale with 0 being no pain and 10 being the worst pain imaginable.  Pain Location: L LB      Occupation: retired  Leisure: sewing      Pts goals: no pain    Objective     Baseline Isometric Testing on Med X equipment: Testing administered by PT  Date of testin22  ROM 3-36 deg   Max Peak Torque 93    Min Peak Torque 42    Flex/Ext Ratio 2.2:1   % below normative data 32          Treatment    Pt was instructed in and performed the following:     Julee received therapeutic exercises to develop/improved posture, cardiovascular endurance, muscular endurance, lumbar/cervical ROM, strength and muscular endurance for 45 minutes including the following exercises:         Prone lying 2 min  Press ups on elbows  2/10  Bridging 2/10  Standing extension x10  LTR  "20x    [facility] Therapy 2/9/2022   Visit Number 6   VAS Pain Rating 2   Recumbent Bike Seat Pos. 5   Time 10   Extension in Lying 20   Extension in Standing 10   Lumbar Extension Seat Pad -   Femur Restraint -   Top Dead Center -   Counterweight -   Lumbar Flexion -   Lumbar Extension -   Lumbar Peak Torque -   Min Torque -   Test Percent Below Normative Data -   Lumbar Weight 45   Repetitions 20   Rating of Perceived Exertion 4   Ice - Z Lie (in min.)            Peripheral muscle strengthening which included 1 set of 15-20 repetitions at a slow, controlled 10-13 second per rep pace focused on strengthening supporting musculature for improved body mechanics and functional mobility.  Pt and therapist focused on proper form during treatment to ensure optimal strengthening of each targeted muscle group.  Machines were utilized including torso rotation, leg extension, leg curl, chest press, upright row. Tricep extension, bicep curl, leg press, and hip abduction added visit 3    Julee received the following manual therapy techniques: n/a were applied to the: n/a for n/a minutes.         Home Exercises Provided and Patient Education Provided   Home exercises include:  Prone lying 2 min  Press ups on elbows  2/10  Bridging 2/10  Standing extension x10    Cardio program:  Lifting education date:  Lumbar roll:     Education provided:   - Educated pt on the importance of daily stretch to increase the benefit of program and positive results.     Written Home Exercises Provided: yes.  Exercises were reviewed and Julee was able to demonstrate them prior to the end of the session.  Julee demonstrated good  understanding of the education provided.     See EMR under Patient Instructions for exercises provided prior visit.      Assessment:  Julee is tolerating progression with resistance or reps with exercises with appropriate perceived exertion. Limited torso rotation to shorter arc of motion secondary to complaints of "popping" " in her back. Also cued pt to complete full arc of motion on several exercises.  Patient is making good progress towards established goals.    Pt will continue to benefit from skilled outpatient physical therapy to address the deficits stated in the impairment chart, provide pt/family education and to maximize pt's level of independence in the home and community environment.     Anticipated Barriers for therapy: none  Pt's spiritual, cultural and educational needs considered and pt agreeable to plan of care and goals as stated below:       Goals:     GOALS: Pt is in agreement with the following goals.     Short term goals:  6 weeks or 10 visits   1.  Pt will demonstrate increased lumbar ROM by at least 3 degrees from the initial ROM value with improvements noted in functional ROM and ability to perform ADLs.  (approp and ongoing)  2.  Pt will demonstrate increased MedX average isometric strength value  by 20% from initial test resulting in improved ability to perform bending, lifting, and carrying activities safely, confidently.  (approp and ongoing)  3.  Patient report a reduction in worst pain score by 1-2 points for improved tolerance for bending and lifting.  (approp and ongoing)  4.  Pt able to perform HEP correctly with minimal cueing or supervision from therapist to encourage independent management of symptoms. (approp and ongoing)        Long term goals: 10 weeks or 20 visits   1. Pt will demonstrate increased lumbar ROM by at least 6 degrees from initial ROM value, resulting in improved ability to perform functional fwd bending while standing and sitting. (approp and ongoing)  2. Pt will demonstrate increased MedX average isometric strength value  by 30% from initial test resulting in improved ability to perform bending, lifting, and carrying activities safely, confidently.  (approp and ongoing)  3. Pt to demonstrate ability to independently control and reduce their pain through posture positioning and  mechanical movements throughout a typical day.  (approp and ongoing)  4.  Pt will demonstrate reduced pain and improved functional outcomes as reported on the FOTO by reaching a limitation score of < or = 50% or less in order to demonstrate subjective improvement in pt's condition.    (approp and ongoing)  5. Pt will demonstrate independence with the HEP at discharge  (approp and ongoing)  6.  Pt will have no pain with daily ADLs and will increase overall strength(patient goal)  (approp and ongoing)       Plan   Continue with established Plan of Care towards established PT goals.

## 2022-02-14 ENCOUNTER — HOSPITAL ENCOUNTER (OUTPATIENT)
Dept: RADIOLOGY | Facility: HOSPITAL | Age: 72
Discharge: HOME | End: 2022-02-14
Attending: OBSTETRICS & GYNECOLOGY
Payer: MEDICARE

## 2022-02-14 ENCOUNTER — CLINICAL SUPPORT (OUTPATIENT)
Dept: REHABILITATION | Facility: HOSPITAL | Age: 72
End: 2022-02-14
Payer: MEDICARE

## 2022-02-14 ENCOUNTER — TELEPHONE (OUTPATIENT)
Dept: REHABILITATION | Facility: HOSPITAL | Age: 72
End: 2022-02-14

## 2022-02-14 DIAGNOSIS — M54.50 CHRONIC MIDLINE LOW BACK PAIN WITHOUT SCIATICA: ICD-10-CM

## 2022-02-14 DIAGNOSIS — M25.69 DECREASED ROM OF TRUNK AND BACK: Primary | ICD-10-CM

## 2022-02-14 DIAGNOSIS — G89.29 CHRONIC MIDLINE LOW BACK PAIN WITHOUT SCIATICA: ICD-10-CM

## 2022-02-14 DIAGNOSIS — Z12.31 VISIT FOR SCREENING MAMMOGRAM: Primary | ICD-10-CM

## 2022-02-14 DIAGNOSIS — Z12.31 VISIT FOR SCREENING MAMMOGRAM: ICD-10-CM

## 2022-02-14 DIAGNOSIS — R29.898 DECREASED STRENGTH OF TRUNK AND BACK: ICD-10-CM

## 2022-02-14 PROCEDURE — 77063 BREAST TOMOSYNTHESIS BI: CPT | Mod: 26,,, | Performed by: RADIOLOGY

## 2022-02-14 PROCEDURE — 77067 SCR MAMMO BI INCL CAD: CPT | Mod: TC,PN

## 2022-02-14 PROCEDURE — 77063 MAMMO DIGITAL SCREENING BILAT WITH TOMO: ICD-10-PCS | Mod: 26,,, | Performed by: RADIOLOGY

## 2022-02-14 PROCEDURE — 77067 SCR MAMMO BI INCL CAD: CPT | Mod: 26,,, | Performed by: RADIOLOGY

## 2022-02-14 PROCEDURE — 97110 THERAPEUTIC EXERCISES: CPT | Mod: PO,CQ

## 2022-02-14 PROCEDURE — 77063 BREAST TOMOSYNTHESIS BI: CPT | Mod: TC,PN

## 2022-02-14 PROCEDURE — 77067 MAMMO DIGITAL SCREENING BILAT WITH TOMO: ICD-10-PCS | Mod: 26,,, | Performed by: RADIOLOGY

## 2022-02-17 ENCOUNTER — CLINICAL SUPPORT (OUTPATIENT)
Dept: REHABILITATION | Facility: HOSPITAL | Age: 72
End: 2022-02-17
Payer: MEDICARE

## 2022-02-17 DIAGNOSIS — M54.50 CHRONIC MIDLINE LOW BACK PAIN WITHOUT SCIATICA: ICD-10-CM

## 2022-02-17 DIAGNOSIS — G89.29 CHRONIC MIDLINE LOW BACK PAIN WITHOUT SCIATICA: ICD-10-CM

## 2022-02-17 DIAGNOSIS — R29.898 DECREASED STRENGTH OF TRUNK AND BACK: ICD-10-CM

## 2022-02-17 DIAGNOSIS — M25.69 DECREASED ROM OF TRUNK AND BACK: Primary | ICD-10-CM

## 2022-02-17 PROCEDURE — 97110 THERAPEUTIC EXERCISES: CPT | Mod: PO,CQ

## 2022-02-17 NOTE — PROGRESS NOTES
" [facility]      Name: Julee Bee  Clinic Number: [id number]    Therapy Diagnosis:   Encounter Diagnoses   Name Primary?    Decreased ROM of trunk and back Yes    Chronic midline low back pain without sciatica     Decreased strength of trunk and back      Physician: Jesse Mejía MD    Visit Date: 2022    Physician: Jesse Mejía MD     Physician Orders: PT Eval and Treat   Medical Diagnosis from Referral: Low back pain  Evaluation Date: 2022  Authorization Period Expiration: 22  Plan of Care Expiration: 22  Reassessment Due: 10th visit  Visit # / Visits authorized:  (transfer to [facility] program 21)  [facility] visit 6     Time In: 10:00 pm  Time Out: 11:00pm  Total Billable Time: 50 minutes     Precautions: Standard and Diabetes     Pattern of pain determined: pattern 1 PEP      Subjective   Julee stated she has been feeling tired since she almost feel in the shower. She feels her LB and B hips are sore all the time.     Patient reports tolerating previous visit well with no pain.   Patient reports their pain to be 5/10 on a 0-10 scale with 0 being no pain and 10 being the worst pain imaginable.  Pain Location: L LB      Occupation: retired  Leisure: sewing      Pts goals: no pain    Objective     Baseline Isometric Testing on Med X equipment: Testing administered by PT  Date of testin22  ROM 3-36 deg   Max Peak Torque 93    Min Peak Torque 42    Flex/Ext Ratio 2.2:1   % below normative data 32          Treatment    Pt was instructed in and performed the following:     Julee received therapeutic exercises to develop/improved posture, cardiovascular endurance, muscular endurance, lumbar/cervical ROM, strength and muscular endurance for 60 minutes including the following exercises:         Prone lying 2 min  Press ups on elbows  2/10  Bridging 2/10   Standing extension x10  LTR 20x  Piriformis 30" 2x  [facility] " Therapy 2/17/2022   Visit Number 8   VAS Pain Rating 4   Recumbent Bike Seat Pos. 5   Time 10   Extension in Lying 20   Extension in Standing 10   Lumbar Extension Seat Pad -   Femur Restraint -   Top Dead Center -   Counterweight -   Lumbar Flexion -   Lumbar Extension -   Lumbar Peak Torque -   Min Torque -   Test Percent Below Normative Data -   Lumbar Weight 45   Repetitions 18   Rating of Perceived Exertion 3   Ice - Z Lie (in min.) -       Peripheral muscle strengthening which included 1 set of 15-20 repetitions at a slow, controlled 10-13 second per rep pace focused on strengthening supporting musculature for improved body mechanics and functional mobility.  Pt and therapist focused on proper form during treatment to ensure optimal strengthening of each targeted muscle group.  Machines were utilized including torso rotation, leg extension, leg curl, chest press, upright row. Tricep extension, bicep curl, leg press, and hip abduction added visit 3    Julee received the following manual therapy techniques: n/a were applied to the: n/a for n/a minutes.         Home Exercises Provided and Patient Education Provided   Home exercises include:  Prone lying 2 min  Press ups on elbows  2/10  Bridging 2/10  Standing extension x10    Cardio program:2/7/22  Lifting education date:  Lumbar roll:     Education provided:   - Educated pt on the importance of daily stretch to increase the benefit of program and positive results.     Written Home Exercises Provided: yes.  Exercises were reviewed and Julee was able to demonstrate them prior to the end of the session.  Julee demonstrated good  understanding of the education provided.     See EMR under Patient Instructions for exercises provided prior visit.      Assessment:  Julee with less LBP with session. Pt demo her HEP well with min vc to pause and slow down. Added piriformis stretch b/c pt had B hip discomfort. Pt able to cont to progress with exercises,  pt conts to  linger with different forms of pain that fluctuate. Example being constipated, prolonged walking and just c/o soreness in B hips all the time.   Patient is making good progress towards established goals.    Pt will continue to benefit from skilled outpatient physical therapy to address the deficits stated in the impairment chart, provide pt/family education and to maximize pt's level of independence in the home and community environment.     Anticipated Barriers for therapy: none  Pt's spiritual, cultural and educational needs considered and pt agreeable to plan of care and goals as stated below:       GOALS: Pt is in agreement with the following goals.     Short term goals:  6 weeks or 10 visits   1.  Pt will demonstrate increased lumbar ROM by at least 3 degrees from the initial ROM value with improvements noted in functional ROM and ability to perform ADLs.  (approp and ongoing)  2.  Pt will demonstrate increased MedX average isometric strength value  by 20% from initial test resulting in improved ability to perform bending, lifting, and carrying activities safely, confidently.  (approp and ongoing)  3.  Patient report a reduction in worst pain score by 1-2 points for improved tolerance for bending and lifting.  (approp and ongoing)  4.  Pt able to perform HEP correctly with minimal cueing or supervision from therapist to encourage independent management of symptoms. (approp and ongoing)        Long term goals: 10 weeks or 20 visits   1. Pt will demonstrate increased lumbar ROM by at least 6 degrees from initial ROM value, resulting in improved ability to perform functional fwd bending while standing and sitting. (approp and ongoing)  2. Pt will demonstrate increased MedX average isometric strength value  by 30% from initial test resulting in improved ability to perform bending, lifting, and carrying activities safely, confidently.  (approp and ongoing)  3. Pt to demonstrate ability to independently control and  reduce their pain through posture positioning and mechanical movements throughout a typical day.  (approp and ongoing)  4.  Pt will demonstrate reduced pain and improved functional outcomes as reported on the FOTO by reaching a limitation score of < or = 50% or less in order to demonstrate subjective improvement in pt's condition.    (approp and ongoing)  5. Pt will demonstrate independence with the HEP at discharge  (approp and ongoing)  6.  Pt will have no pain with daily ADLs and will increase overall strength(patient goal)  (approp and ongoing)       Plan   Continue with established Plan of Care towards established PT goals.

## 2022-02-21 ENCOUNTER — CLINICAL SUPPORT (OUTPATIENT)
Dept: REHABILITATION | Facility: HOSPITAL | Age: 72
End: 2022-02-21
Payer: MEDICARE

## 2022-02-21 DIAGNOSIS — M54.50 CHRONIC MIDLINE LOW BACK PAIN WITHOUT SCIATICA: ICD-10-CM

## 2022-02-21 DIAGNOSIS — G89.29 CHRONIC MIDLINE LOW BACK PAIN WITHOUT SCIATICA: ICD-10-CM

## 2022-02-21 DIAGNOSIS — M25.69 DECREASED ROM OF TRUNK AND BACK: Primary | ICD-10-CM

## 2022-02-21 DIAGNOSIS — R29.898 DECREASED STRENGTH OF TRUNK AND BACK: ICD-10-CM

## 2022-02-21 PROCEDURE — 97110 THERAPEUTIC EXERCISES: CPT | Mod: PO | Performed by: PHYSICAL THERAPIST

## 2022-02-21 NOTE — PROGRESS NOTES
"    [facility]      Name: Julee Bee  Clinic Number: [id number]    Therapy Diagnosis:   Encounter Diagnoses   Name Primary?    Decreased ROM of trunk and back Yes    Chronic midline low back pain without sciatica     Decreased strength of trunk and back      Physician: Jesse Mejía MD    Visit Date: 2/21/2022    Physician: Jesse Mejía MD     Physician Orders: PT Eval and Treat   Medical Diagnosis from Referral: Low back pain  Evaluation Date: 1/12/2022  Authorization Period Expiration: 5/8/22  Plan of Care Expiration: 4/12/22  Reassessment Due: 10th visit  Visit # / Visits authorized: 20/ 20 (transfer to [facility] program 1/12/21)  [facility] visit 9/20     Time In: 10:45 am  Time Out: 11:40am  Total Billable Time: 45 minutes     Precautions: Standard and Diabetes     Pattern of pain determined: pattern 1 PEP      Subjective   Julee stated two weeks ago while in shower, her legs just felt like they were going to give out on her, like her knees were going to buckle. She states every once in a while, her legs just feel very weak and cannot support her. This time, she felt "almost paralyzed, stating she couldn't move her legs for 2-3 minutes. She states she was diagnosed with venous insufficiency of bilateral LEs. She does feel she is stronger since beginning [facility] program, but continues with pain/soreness in back. She also reports left hip pain, especially with getting out of bed. She sat a lot this weekend. She has not been using lumbar support with sitting as previously discussed.    Patient reports tolerating previous visit well with decreased  pain.   Patient reports their pain to be 4/10 on a 0-10 scale with 0 being no pain and 10 being the worst pain imaginable.  Pain Location: L LB      Occupation: retired  Leisure: sewing      Pts goals: no pain    Objective     Baseline Isometric Testing on Med X equipment: Testing administered by PT  Date " "of testin22  ROM 3-36 deg   Max Peak Torque 93    Min Peak Torque 42    Flex/Ext Ratio 2.2:1   % below normative data 32          Treatment    Pt was instructed in and performed the following:     Julee received therapeutic exercises to develop/improved posture, cardiovascular endurance, muscular endurance, lumbar/cervical ROM, strength and muscular endurance for 45 minutes including the following exercises:     MedX dynamic exercise  Prone lying 2 min  Press ups   2/10  Bridging 2/10   Standing extension x10  LTR 20x  Piriformis 30" 2x    [facility] 2022   Visit Number 9   VAS Pain Rating 4   Recumbent Bike Seat Pos. 5   Time 10   Extension in Lying 20   Extension in Standing 10   Lumbar Extension Seat Pad -   Femur Restraint -   Top Dead Center -   Counterweight -   Lumbar Flexion -   Lumbar Extension -   Lumbar Peak Torque -   Min Torque -   Test Percent Below Normative Data -   Lumbar Weight 45   Repetitions 20   Rating of Perceived Exertion 3   Ice - Z Lie (in min.) 10         Peripheral muscle strengthening which included 1 set of 15-20 repetitions at a slow, controlled 10-13 second per rep pace focused on strengthening supporting musculature for improved body mechanics and functional mobility.  Pt and therapist focused on proper form during treatment to ensure optimal strengthening of each targeted muscle group.  Machines were utilized including torso rotation, leg extension, leg curl, chest press, upright row. Tricep extension, bicep curl, leg press, and hip abduction added visit 3    Julee received the following manual therapy techniques: n/a were applied to the: n/a for n/a minutes.         Home Exercises Provided and Patient Education Provided   Home exercises include:  Prone lying 2 min  Press ups on elbows  2/10  Bridging 2/10  Standing extension x10    Cardio program:22  Lifting education date:  Lumbar roll:     Education provided:   - Educated pt on the importance of daily " stretch to increase the benefit of program and positive results.     Written Home Exercises Provided: yes.  Exercises were reviewed and Julee was able to demonstrate them prior to the end of the session.  Julee demonstrated good  understanding of the education provided.     See EMR under Patient Instructions for exercises provided prior visit.      Assessment:   Pt presents with chronic low back pain. She has received extensive physical therapy over the last year since her lumbar surgery and was transferred to the [facility] program on 1/12/22 for continued stretching and strengthening with anticipation to transition to wellness. She presents with slightly flexed posture with decreased lumbar lordosis. She has limited lumbar ROM especially in extension. Pain worsens with flexion activities and improves with extension. She would benefit from comprehensive back and extremity strengthening, stretching, instruction in posture and body mechanics. Discussed and recommended use of lumbar support with sitting for improved mechanics. Pt is demonstrating a gradual progression in strength as indicated by slow progression of resistance with exercise. She will be tested next visit, on visit 10 of [facility] program, to assess improvement in lumbar strength and ROM. Anticipate continuing [facility] program for a total of 20 visits and transition to wellness at that time.    Patient is making good progress towards established goals.    Pt will continue to benefit from skilled outpatient physical therapy to address the deficits stated in the impairment chart, provide pt/family education and to maximize pt's level of independence in the home and community environment.     Anticipated Barriers for therapy: none  Pt's spiritual, cultural and educational needs considered and pt agreeable to plan of care and goals as stated below:       GOALS: Pt is in agreement with the following goals.     Short term goals:  6 weeks or 10 visits    1.  Pt will demonstrate increased lumbar ROM by at least 3 degrees from the initial ROM value with improvements noted in functional ROM and ability to perform ADLs.  (approp and ongoing)  2.  Pt will demonstrate increased MedX average isometric strength value  by 20% from initial test resulting in improved ability to perform bending, lifting, and carrying activities safely, confidently.  (approp and ongoing)  3.  Patient report a reduction in worst pain score by 1-2 points for improved tolerance for bending and lifting.  (approp and ongoing)  4.  Pt able to perform HEP correctly with minimal cueing or supervision from therapist to encourage independent management of symptoms. (approp and ongoing)        Long term goals: 10 weeks or 20 visits   1. Pt will demonstrate increased lumbar ROM by at least 6 degrees from initial ROM value, resulting in improved ability to perform functional fwd bending while standing and sitting. (approp and ongoing)  2. Pt will demonstrate increased MedX average isometric strength value  by 30% from initial test resulting in improved ability to perform bending, lifting, and carrying activities safely, confidently.  (approp and ongoing)  3. Pt to demonstrate ability to independently control and reduce their pain through posture positioning and mechanical movements throughout a typical day.  (approp and ongoing)  4.  Pt will demonstrate reduced pain and improved functional outcomes as reported on the FOTO by reaching a limitation score of < or = 50% or less in order to demonstrate subjective improvement in pt's condition.    (approp and ongoing)  5. Pt will demonstrate independence with the HEP at discharge  (approp and ongoing)  6.  Pt will have no pain with daily ADLs and will increase overall strength(patient goal)  (approp and ongoing)       Plan   Continue with established Plan of Care towards established PT goals.

## 2022-02-23 ENCOUNTER — OFFICE VISIT (OUTPATIENT)
Dept: OBSTETRICS AND GYNECOLOGY | Facility: CLINIC | Age: 72
End: 2022-02-23
Payer: MEDICARE

## 2022-02-23 VITALS — HEIGHT: 60 IN | BODY MASS INDEX: 32.11 KG/M2 | WEIGHT: 163.56 LBS

## 2022-02-23 DIAGNOSIS — Z01.419 ROUTINE GYNECOLOGICAL EXAMINATION: Primary | ICD-10-CM

## 2022-02-23 PROCEDURE — 1157F ADVNC CARE PLAN IN RCRD: CPT | Mod: CPTII,S$GLB,, | Performed by: OBSTETRICS & GYNECOLOGY

## 2022-02-23 PROCEDURE — 1125F PR PAIN SEVERITY QUANTIFIED, PAIN PRESENT: ICD-10-PCS | Mod: CPTII,S$GLB,, | Performed by: OBSTETRICS & GYNECOLOGY

## 2022-02-23 PROCEDURE — 4010F ACE/ARB THERAPY RXD/TAKEN: CPT | Mod: CPTII,S$GLB,, | Performed by: OBSTETRICS & GYNECOLOGY

## 2022-02-23 PROCEDURE — 4010F PR ACE/ARB THEARPY RXD/TAKEN: ICD-10-PCS | Mod: CPTII,S$GLB,, | Performed by: OBSTETRICS & GYNECOLOGY

## 2022-02-23 PROCEDURE — 99999 PR PBB SHADOW E&M-EST. PATIENT-LVL II: ICD-10-PCS | Mod: PBBFAC,,, | Performed by: OBSTETRICS & GYNECOLOGY

## 2022-02-23 PROCEDURE — 3008F PR BODY MASS INDEX (BMI) DOCUMENTED: ICD-10-PCS | Mod: CPTII,S$GLB,, | Performed by: OBSTETRICS & GYNECOLOGY

## 2022-02-23 PROCEDURE — 3288F PR FALLS RISK ASSESSMENT DOCUMENTED: ICD-10-PCS | Mod: CPTII,S$GLB,, | Performed by: OBSTETRICS & GYNECOLOGY

## 2022-02-23 PROCEDURE — 1125F AMNT PAIN NOTED PAIN PRSNT: CPT | Mod: CPTII,S$GLB,, | Performed by: OBSTETRICS & GYNECOLOGY

## 2022-02-23 PROCEDURE — G0101 CA SCREEN;PELVIC/BREAST EXAM: HCPCS | Mod: S$GLB,,, | Performed by: OBSTETRICS & GYNECOLOGY

## 2022-02-23 PROCEDURE — 3008F BODY MASS INDEX DOCD: CPT | Mod: CPTII,S$GLB,, | Performed by: OBSTETRICS & GYNECOLOGY

## 2022-02-23 PROCEDURE — 1159F MED LIST DOCD IN RCRD: CPT | Mod: CPTII,S$GLB,, | Performed by: OBSTETRICS & GYNECOLOGY

## 2022-02-23 PROCEDURE — 3288F FALL RISK ASSESSMENT DOCD: CPT | Mod: CPTII,S$GLB,, | Performed by: OBSTETRICS & GYNECOLOGY

## 2022-02-23 PROCEDURE — 1159F PR MEDICATION LIST DOCUMENTED IN MEDICAL RECORD: ICD-10-PCS | Mod: CPTII,S$GLB,, | Performed by: OBSTETRICS & GYNECOLOGY

## 2022-02-23 PROCEDURE — 1101F PR PT FALLS ASSESS DOC 0-1 FALLS W/OUT INJ PAST YR: ICD-10-PCS | Mod: CPTII,S$GLB,, | Performed by: OBSTETRICS & GYNECOLOGY

## 2022-02-23 PROCEDURE — 1157F PR ADVANCE CARE PLAN OR EQUIV PRESENT IN MEDICAL RECORD: ICD-10-PCS | Mod: CPTII,S$GLB,, | Performed by: OBSTETRICS & GYNECOLOGY

## 2022-02-23 PROCEDURE — G0101 PR CA SCREEN;PELVIC/BREAST EXAM: ICD-10-PCS | Mod: S$GLB,,, | Performed by: OBSTETRICS & GYNECOLOGY

## 2022-02-23 PROCEDURE — 99999 PR PBB SHADOW E&M-EST. PATIENT-LVL II: CPT | Mod: PBBFAC,,, | Performed by: OBSTETRICS & GYNECOLOGY

## 2022-02-23 PROCEDURE — 1101F PT FALLS ASSESS-DOCD LE1/YR: CPT | Mod: CPTII,S$GLB,, | Performed by: OBSTETRICS & GYNECOLOGY

## 2022-02-23 NOTE — PROGRESS NOTES
Chief Complaint   Patient presents with    Well Woman    mmg done last week     History of Present Illness: Julee Bee is a 71 y.o. female that presents today 2/23/2022 for well gyn visit.    Past Medical History:   Diagnosis Date    Back pain     Colon polyp     GERD (gastroesophageal reflux disease)     H/O bronchitis     H/O: pneumonia     Hearing aid worn     Hyperlipidemia     Hypertension     IBS (irritable bowel syndrome)        Past Surgical History:   Procedure Laterality Date    BACK SURGERY  01/18/2021    LUMBAR     BREAST BIOPSY Right     neg    BREAST CYST EXCISION Left 1990/ 2005    x2- neg    CATARACT EXTRACTION EXTRACAPSULAR W/ INTRAOCULAR LENS IMPLANTATION Bilateral     CHOLECYSTECTOMY  2014    CLOSURE OF WOUND N/A 9/11/2020    Procedure: CLOSURE, WOUND;  Surgeon: Khanh Karimi MD;  Location: [facility];  Service: ENT;  Laterality: N/A;    COLONOSCOPY  2011    repeat in 10    COLONOSCOPY  08/16/2010    Dr He received report; polyps removed and bx negative    COLONOSCOPY  02/19/2020        COLONOSCOPY N/A 2/19/2020    Procedure: COLONOSCOPY;  Surgeon: Alok Nieves MD;  Location: [facility];  Service: Endoscopy;  Laterality: N/A;    EXCISION OF BURSA OF HIP Right     EYE SURGERY      FINGER SURGERY      HEEL SPUR EXCISION      HYSTERECTOMY  1978    for prolapse    REMOVAL OF FOREIGN BODY FROM HEAD N/A 9/11/2020    Procedure: REMOVAL, FOREIGN BODY, HEAD;  Surgeon: Khanh Karimi MD;  Location: [facility];  Service: ENT;  Laterality: N/A;  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 He; reflux per pt report       Current Outpatient Medications   Medication Sig Dispense Refill    acyclovir (ZOVIRAX) 800 MG Tab Take 800 mg by mouth every 7 days.       b complex vitamins tablet Take 1 tablet by mouth once daily.      BIOTIN ORAL Take by mouth.      cetirizine (ZYRTEC) 10 MG tablet Take 10 mg by mouth once daily.      co-enzyme Q-10 30 mg capsule Take 30 mg by mouth once daily.       diclofenac sodium 100 mg 24 hr tablet       dicyclomine (BENTYL) 10 MG capsule Take 1 capsule by mouth daily as needed.      doxycycline (PERIOSTAT) 20 MG tablet Take 20 mg by mouth once daily.       hydrocortisone 2.5 % cream Apply 1 application topically daily as needed.      lisinopriL-hydrochlorothiazide (PRINZIDE,ZESTORETIC) 20-25 mg Tab TAKE 1 TABLET EVERY DAY 90 tablet 3    metFORMIN (GLUCOPHAGE-XR) 750 MG ER 24hr tablet TAKE 1 TABLET TWICE DAILY 180 tablet 3    multivitamin (THERAGRAN) per tablet Take 1 tablet by mouth once daily.      ondansetron (ZOFRAN) 4 MG tablet ondansetron HCl 4 mg tablet   TAKE 1 TABLET (4 MG TOTAL) BY MOUTH EVERY 12 (TWELVE) HOURS AS NEEDED FOR NAUSEA.      pantoprazole (PROTONIX) 40 MG tablet Take 1 tablet (40 mg total) by mouth once daily. 90 tablet 3    pramipexole (MIRAPEX) 0.5 MG tablet TAKE 1 TABLET EVERY EVENING. 90 tablet 1    rosuvastatin (CRESTOR) 20 MG tablet 40 mg.       ACCU-CHEK FASTCLIX LANCET DRUM [facility] TO TEST BLOOD SUGAR EVERY  each 5    blood sugar diagnostic Strp 1 strip by Misc.(Non-Drug; Combo Route) route 2 (two) times a day. Accu chek 100 strip 4     No current facility-administered medications for this visit.     Facility-Administered Medications Ordered in Other Visits   Medication Dose Route Frequency Provider Last Rate Last Admin    lactated ringers infusion   Intravenous Continuous Rashard Savage MD   Stopped at 09/11/20 0809    lactated  ringers infusion   Intravenous Continuous Mikie Fuentes MD   Stopped at 20 0920       Review of patient's allergies indicates:   Allergen Reactions    Cefdinir Other (See Comments)    Bactrim [sulfamethoxazole-trimethoprim] Rash    Darvon [propoxyphene] Nausea Only    Feldene [piroxicam] Hives    Tramadol Nausea Only       Family History   Problem Relation Age of Onset    Cancer Paternal Grandfather     Stomach cancer Paternal Grandfather     Diabetes Maternal Grandmother     Heart disease Maternal Grandmother     Diabetes Mother     Heart disease Mother     Diabetes Brother     Heart disease Brother     Breast cancer Cousin 60    Breast cancer Maternal Cousin 50    Breast cancer Paternal Cousin 50    Breast cancer Paternal Cousin 70    Heart disease Father     Arthritis Father     Lupus Father     Ovarian cancer Neg Hx     Colon polyps Neg Hx     Esophageal cancer Neg Hx     Crohn's disease Neg Hx     Ulcerative colitis Neg Hx        Social History     Socioeconomic History    Marital status:    Tobacco Use    Smoking status: Never Smoker    Smokeless tobacco: Never Used   Substance and Sexual Activity    Alcohol use: Yes     Alcohol/week: 0.0 standard drinks     Comment: Social     Drug use: No    Sexual activity: Yes     Partners: Male     Birth control/protection: See Surgical Hx       OB History    Para Term  AB Living   3 3 3     3   SAB IAB Ectopic Multiple Live Births                  # Outcome Date GA Lbr Ruy/2nd Weight Sex Delivery Anes PTL Lv   3 Term      Vag-Spont      2 Term      Vag-Spont      1 Term      Vag-Spont          Review of Symptoms:  GENERAL: Denies weight gain or weight loss. Feeling well overall.   SKIN: Denies rash or lesions.   HEAD: Denies head injury or headache.   NODES: Denies enlarged lymph nodes.   CHEST: Denies chest pain or shortness of breath.   CARDIOVASCULAR: Denies palpitations or left sided chest pain.    ABDOMEN: No abdominal pain, constipation, diarrhea, nausea, vomiting or rectal bleeding.   URINARY: No frequency, dysuria, hematuria, or burning on urination.  HEMATOLOGIC: No easy bruisability or excessive bleeding.   MUSCULOSKELETAL: Denies joint pain or swelling.     Ht 5' (1.524 m)   Wt 74.2 kg (163 lb 9.3 oz)   Physical Exam:  APPEARANCE: Well nourished, well developed, in no acute distress.  SKIN: Normal skin turgor, no lesions.  NECK: Neck symmetric without masses   RESPIRATORY: Normal respiratory effort with no retractions or use of accessory muscles  CARDIOVASCULAR: Peripheral vascular system with no swelling no varicosities and palpation of pulses normal  LYMPHATIC: No enlargements of the lymph nodes noted in the neck, axillae, or groin  ABDOMEN: Soft. No tenderness or masses. No hepatosplenomegaly. No hernias.  BREASTS: Symmetrical, no skin changes or visible lesions. No palpable masses, nipple discharge or adenopathy bilaterally.  PELVIC: Normal external female genitalia without lesions. Normal hair distribution. Adequate perineal body, normal urethral meatus. Urethra with no masses.  Bladder nontender. Vagina moist and well rugated without lesions or discharge. No significant cystocele or rectocele.  Adnexa without masses or tenderness. Urethra and bladder normal.   EXTREMITIES: No clubbing cyanosis or edema.    ASSESSMENT/PLAN:  Routine gynecological examination          Patient was counseled today on Pap guidelines. We discussed the discontinuing the pap smear after hysterectomy except in certain high risk cases.  We discussed the need for pelvic exams.   We discussed STD screening if at high risk for an STD.  We discussed breast cancer screening with mammograms every other year after the age of 40 and annually after the age of 50.    We discussed colon cancer screening.   Osteoporosis screening with the Dexa Bone Scan discussed when indicated.   She will see her PCP for other health maintenance.        FOLLOW-UP:prn

## 2022-02-24 ENCOUNTER — CLINICAL SUPPORT (OUTPATIENT)
Dept: REHABILITATION | Facility: HOSPITAL | Age: 72
End: 2022-02-24
Payer: MEDICARE

## 2022-02-24 DIAGNOSIS — R29.898 DECREASED STRENGTH OF TRUNK AND BACK: ICD-10-CM

## 2022-02-24 DIAGNOSIS — G89.29 CHRONIC MIDLINE LOW BACK PAIN WITHOUT SCIATICA: ICD-10-CM

## 2022-02-24 DIAGNOSIS — M54.50 CHRONIC MIDLINE LOW BACK PAIN WITHOUT SCIATICA: ICD-10-CM

## 2022-02-24 DIAGNOSIS — M25.69 DECREASED ROM OF TRUNK AND BACK: Primary | ICD-10-CM

## 2022-02-24 PROCEDURE — 97750 PHYSICAL PERFORMANCE TEST: CPT | Mod: PO | Performed by: PHYSICAL THERAPIST

## 2022-02-24 PROCEDURE — 97110 THERAPEUTIC EXERCISES: CPT | Mod: PO | Performed by: PHYSICAL THERAPIST

## 2022-02-24 NOTE — PROGRESS NOTES
[facility]      Name: Julee Bee  Clinic Number: [id number]    Therapy Diagnosis:   Encounter Diagnoses   Name Primary?    Decreased ROM of trunk and back Yes    Chronic midline low back pain without sciatica     Decreased strength of trunk and back      Physician: Jesse Mejía MD    Visit Date: 2022    Physician: Jesse Mejía MD     Physician Orders: PT Eval and Treat   Medical Diagnosis from Referral: Low back pain  Evaluation Date: 2022  Authorization Period Expiration: 22  Plan of Care Expiration: 22  Reassessment: 22  Reassessment Due:  visit  Visit # / Visits authorized:  (transfer to [facility] program 21)  [facility] visit 10/20     Time In: 9:50 am  Time Out: 11:00am  Total Billable Time:60 minutes     Precautions: Standard and Diabetes     Pattern of pain determined: pattern 1 PEP      Subjective   Julee reports pain is about the same. She saw Dr. Mejía and he said Xrays looked good with good healing, but with some degeneration above level of fusion. He states left hip pain is from bursitis and he wants her to see orthopedist and have injection.  Patient reports tolerating previous visit well with decreased  pain.   Patient reports their pain to be 3/10 on a 0-10 scale with 0 being no pain and 10 being the worst pain imaginable.  Pain Location: L LB      Occupation: retired  Leisure: sewing      Pts goals: no pain    Objective     Baseline Isometric Testing on Med X equipment: Testing administered by PT  Date of testin22  ROM 0-39 deg 3-36 deg   Max Peak Torque 113 93    Min Peak Torque 57 42    Flex/Ext Ratio 2:1 2.2:1   % below normative data 4 32          Outcomes:  Initial visit: 58%  Visit 5: 46%  Visit 10: 50%    Treatment    Pt was instructed in and performed the following:     Julee received therapeutic exercises to develop/improved posture, cardiovascular endurance,  "muscular endurance, lumbar/cervical ROM, strength and muscular endurance for 60 minutes including the following exercises:     MedX dynamic exercise and isometric testing  Prone on elbows 2 min  Press ups   2/10  Bridging 2/10   Standing extension x10  LTR 20x  Piriformis 30" 2x    [facility] 2/24/2022   Visit Number 10   VAS Pain Rating 3   Recumbent Bike Seat Pos. 5   Time 10   Extension in Lying 20   Extension in Standing 10   Lumbar Extension Seat Pad -   Femur Restraint -   Top Dead Center -   Counterweight -   Lumbar Flexion 39   Lumbar Extension 0   Lumbar Peak Torque 113   Min Torque 57   Test Percent Below Normative Data 4   Test Percent Gain in Strength from Initial  54   Lumbar Weight -   Repetitions -   Rating of Perceived Exertion -   Ice - Z Lie (in min.) 10           Peripheral muscle strengthening which included 1 set of 15-20 repetitions at a slow, controlled 10-13 second per rep pace focused on strengthening supporting musculature for improved body mechanics and functional mobility.  Pt and therapist focused on proper form during treatment to ensure optimal strengthening of each targeted muscle group.  Machines were utilized including torso rotation, leg extension, leg curl, chest press, upright row. Tricep extension, bicep curl, leg press, and hip abduction added visit 3    Julee received the following manual therapy techniques: n/a were applied to the: n/a for n/a minutes.         Home Exercises Provided and Patient Education Provided   Home exercises include:  Prone lying 2 min  Press ups on elbows  2/10  Bridging 2/10  Standing extension x10    Cardio program:2/7/22  Lifting education date:  Lumbar roll: has not purchased    Education provided:   - Educated pt on the importance of daily stretch to increase the benefit of program and positive results.     Written Home Exercises Provided: yes.  Exercises were reviewed and Julee was able to demonstrate them prior to the end of the " session.  Julee demonstrated good  understanding of the education provided.     See EMR under Patient Instructions for exercises provided prior visit.      Assessment:     Reassessment: 2/24/22:  Patient retested at visit 10 and shows improvement in: pain, ROM, strength and function  Improved posture, currently not using lumbar roll, but plans to purchase  Improved lumbar ROM, 3-36 deg initially on med ex test and 0-39 deg  currently  Improved strength at each test point on lumbar med ex IM test with 54%   average improvement noted with reduced pain  noted by patient  Initial outcome tool score of 58% and current outcome tool score of 50% indicating reduced pain and improved function          Patient is making good progress towards established goals.    Pt will continue to benefit from skilled outpatient physical therapy to address the deficits stated in the impairment chart, provide pt/family education and to maximize pt's level of independence in the home and community environment.     Anticipated Barriers for therapy: none  Pt's spiritual, cultural and educational needs considered and pt agreeable to plan of care and goals as stated below:       GOALS: Pt is in agreement with the following goals.     Short term goals:  6 weeks or 10 visits   1.  Pt will demonstrate increased lumbar ROM by at least 3 degrees from the initial ROM value with improvements noted in functional ROM and ability to perform ADLs.  (approp and ongoing) MET 2/24/22  2.  Pt will demonstrate increased MedX average isometric strength value  by 20% from initial test resulting in improved ability to perform bending, lifting, and carrying activities safely, confidently.  (approp and ongoing) MET 2/24/22   3.  Patient report a reduction in worst pain score by 1-2 points for improved tolerance for bending and lifting.  (approp and ongoing) MET 2/24/22  4.  Pt able to perform HEP correctly with minimal cueing or supervision from therapist to  encourage independent management of symptoms. (approp and ongoing) MET 2/24/22        Long term goals: 10 weeks or 20 visits   1. Pt will demonstrate increased lumbar ROM by at least 6 degrees from initial ROM value, resulting in improved ability to perform functional fwd bending while standing and sitting. (approp and ongoing) MET 2/24/22  2. Pt will demonstrate increased MedX average isometric strength value  by 30% from initial test resulting in improved ability to perform bending, lifting, and carrying activities safely, confidently.  (approp and ongoing) PROGRESSING  3. Pt to demonstrate ability to independently control and reduce their pain through posture positioning and mechanical movements throughout a typical day.  (approp and ongoing) PROGRESSING  4.  Pt will demonstrate reduced pain and improved functional outcomes as reported on the FOTO by reaching a limitation score of < or = 50% or less in order to demonstrate subjective improvement in pt's condition.(approp and ongoing) MET 2/24/22  5. Pt will demonstrate independence with the HEP at discharge  (approp and ongoing)  6.  Pt will have no pain with daily ADLs and will increase overall strength(patient goal)  (approp and ongoing)       Plan   Continue with established Plan of Care towards established PT goals.

## 2022-03-03 ENCOUNTER — CLINICAL SUPPORT (OUTPATIENT)
Dept: REHABILITATION | Facility: HOSPITAL | Age: 72
End: 2022-03-03
Payer: MEDICARE

## 2022-03-03 DIAGNOSIS — M54.50 CHRONIC MIDLINE LOW BACK PAIN WITHOUT SCIATICA: ICD-10-CM

## 2022-03-03 DIAGNOSIS — R29.898 DECREASED STRENGTH OF TRUNK AND BACK: ICD-10-CM

## 2022-03-03 DIAGNOSIS — G89.29 CHRONIC MIDLINE LOW BACK PAIN WITHOUT SCIATICA: ICD-10-CM

## 2022-03-03 DIAGNOSIS — M25.69 DECREASED ROM OF TRUNK AND BACK: Primary | ICD-10-CM

## 2022-03-03 PROCEDURE — 97110 THERAPEUTIC EXERCISES: CPT | Mod: PO | Performed by: PHYSICAL THERAPIST

## 2022-03-03 NOTE — PROGRESS NOTES
[facility]      Name: Julee Bee  Clinic Number: [id number]    Therapy Diagnosis:   Encounter Diagnoses   Name Primary?    Decreased ROM of trunk and back Yes    Chronic midline low back pain without sciatica     Decreased strength of trunk and back      Physician: Jesse Mejía MD    Visit Date: 3/3/2022    Physician: Jesse Mejía MD     Physician Orders: PT Eval and Treat   Medical Diagnosis from Referral: Low back pain  Evaluation Date: 2022  Authorization Period Expiration: 22  Plan of Care Expiration: 22  Reassessment: 22  Reassessment Due:  visit  Visit # / Visits authorized:  (transfer to [facility] program 21)  [facility] visit      Time In: 8:15 am  Time Out: 9:05am  Total Billable Time:50 minutes     Precautions: Standard and Diabetes     Pattern of pain determined: pattern 1 PEP      Subjective   Julee reports Dr. Rodrigues injected left hip yesterday and she reports significant improvement immediately. She awakened this morning without hip pain. Back pain is minimal this morning.    Patient reports tolerating previous visit well with decreased  pain.   Patient reports their pain to be 2/10 on a 0-10 scale with 0 being no pain and 10 being the worst pain imaginable.  Pain Location: L LB      Occupation: retired  Leisure: sewing      Pts goals: no pain    Objective     Baseline Isometric Testing on Med X equipment: Testing administered by PT  Date of testin22  ROM 0-39 deg 3-36 deg   Max Peak Torque 113 93    Min Peak Torque 57 42    Flex/Ext Ratio 2:1 2.2:1   % below normative data 4 32          Outcomes:  Initial visit: 58%  Visit 5: 46%  Visit 10: 50%    Treatment    Pt was instructed in and performed the following:     Julee received therapeutic exercises to develop/improved posture, cardiovascular endurance, muscular endurance, lumbar/cervical ROM, strength and muscular  "endurance for 50 minutes including the following exercises:     MedX dynamic exercise and isometric testing  Prone on elbows 2 min  Press ups   2/10  Bridging 2/10   Standing extension x10  LTR 20x  Piriformis 30" 2x    [facility] 3/3/2022   Visit Number 11   VAS Pain Rating 2   Recumbent Bike Seat Pos. 5   Time 10   Extension in Lying 20   Extension in Standing 10   Lumbar Extension Seat Pad -   Femur Restraint -   Top Dead Center -   Counterweight -   Lumbar Flexion -   Lumbar Extension -   Lumbar Peak Torque -   Min Torque -   Test Percent Below Normative Data -   Test Percent Gain in Strength from Initial  -   Lumbar Weight 48   Repetitions 15   Rating of Perceived Exertion 3   Ice - Z Lie (in min.)              Peripheral muscle strengthening which included 1 set of 15-20 repetitions at a slow, controlled 10-13 second per rep pace focused on strengthening supporting musculature for improved body mechanics and functional mobility.  Pt and therapist focused on proper form during treatment to ensure optimal strengthening of each targeted muscle group.  Machines were utilized including torso rotation, leg extension, leg curl, chest press, upright row. Tricep extension, bicep curl, leg press, and hip abduction added visit 3    Julee received the following manual therapy techniques: n/a were applied to the: n/a for n/a minutes.         Home Exercises Provided and Patient Education Provided   Home exercises include:  Prone lying 2 min  Press ups on elbows  2/10  Bridging 2/10  Standing extension x10    Cardio program:2/7/22, reviewed again 3/3/22  Lifting education date: 3/3/22  Lumbar roll: has not purchased    Education provided:   - Educated pt on the importance of daily stretch to increase the benefit of program and positive results.     Written Home Exercises Provided: yes.  Exercises were reviewed and Julee was able to demonstrate them prior to the end of the session.  Julee demonstrated good  " understanding of the education provided.     See EMR under Patient Instructions for exercises provided prior visit.      Assessment:    Reviewed importance of cardiovascular exercise with pt. She states walking is too painful to her hips. She does have a stationary bike. Recommend she start with 5 minutes per day. Instructed in do's and don'ts of lifting and given handout. Progressed with resistance on MEDX and torso rotation and several peripheral machines with appropriate perceived exertion.     Reassessment: 2/24/22:  Patient retested at visit 10 and shows improvement in: pain, ROM, strength and function  Improved posture, currently not using lumbar roll, but plans to purchase  Improved lumbar ROM, 3-36 deg initially on med ex test and 0-39 deg  currently  Improved strength at each test point on lumbar med ex IM test with 54%   average improvement noted with reduced pain  noted by patient  Initial outcome tool score of 58% and current outcome tool score of 50% indicating reduced pain and improved function          Patient is making good progress towards established goals.    Pt will continue to benefit from skilled outpatient physical therapy to address the deficits stated in the impairment chart, provide pt/family education and to maximize pt's level of independence in the home and community environment.     Anticipated Barriers for therapy: none  Pt's spiritual, cultural and educational needs considered and pt agreeable to plan of care and goals as stated below:       GOALS: Pt is in agreement with the following goals.     Short term goals:  6 weeks or 10 visits   1.  Pt will demonstrate increased lumbar ROM by at least 3 degrees from the initial ROM value with improvements noted in functional ROM and ability to perform ADLs.  (approp and ongoing) MET 2/24/22  2.  Pt will demonstrate increased MedX average isometric strength value  by 20% from initial test resulting in improved ability to perform bending,  lifting, and carrying activities safely, confidently.  (approp and ongoing) MET 2/24/22   3.  Patient report a reduction in worst pain score by 1-2 points for improved tolerance for bending and lifting.  (approp and ongoing) MET 2/24/22  4.  Pt able to perform HEP correctly with minimal cueing or supervision from therapist to encourage independent management of symptoms. (approp and ongoing) MET 2/24/22        Long term goals: 10 weeks or 20 visits   1. Pt will demonstrate increased lumbar ROM by at least 6 degrees from initial ROM value, resulting in improved ability to perform functional fwd bending while standing and sitting. (approp and ongoing) MET 2/24/22  2. Pt will demonstrate increased MedX average isometric strength value  by 30% from initial test resulting in improved ability to perform bending, lifting, and carrying activities safely, confidently.  (approp and ongoing) PROGRESSING  3. Pt to demonstrate ability to independently control and reduce their pain through posture positioning and mechanical movements throughout a typical day.  (approp and ongoing) PROGRESSING  4.  Pt will demonstrate reduced pain and improved functional outcomes as reported on the FOTO by reaching a limitation score of < or = 50% or less in order to demonstrate subjective improvement in pt's condition.(approp and ongoing) MET 2/24/22  5. Pt will demonstrate independence with the HEP at discharge  (approp and ongoing)  6.  Pt will have no pain with daily ADLs and will increase overall strength(patient goal)  (approp and ongoing)       Plan   Continue with established Plan of Care towards established PT goals.

## 2022-03-08 ENCOUNTER — CLINICAL SUPPORT (OUTPATIENT)
Dept: REHABILITATION | Facility: HOSPITAL | Age: 72
End: 2022-03-08
Payer: MEDICARE

## 2022-03-08 DIAGNOSIS — G89.29 CHRONIC MIDLINE LOW BACK PAIN WITHOUT SCIATICA: ICD-10-CM

## 2022-03-08 DIAGNOSIS — M54.50 CHRONIC MIDLINE LOW BACK PAIN WITHOUT SCIATICA: ICD-10-CM

## 2022-03-08 DIAGNOSIS — R29.898 DECREASED STRENGTH OF TRUNK AND BACK: ICD-10-CM

## 2022-03-08 DIAGNOSIS — M25.69 DECREASED ROM OF TRUNK AND BACK: Primary | ICD-10-CM

## 2022-03-08 PROCEDURE — 97110 THERAPEUTIC EXERCISES: CPT | Mod: PO | Performed by: PHYSICAL THERAPIST

## 2022-03-08 NOTE — PROGRESS NOTES
[facility]      Name: Julee Bee  Clinic Number: [id number]    Therapy Diagnosis:   Encounter Diagnoses   Name Primary?    Decreased ROM of trunk and back Yes    Chronic midline low back pain without sciatica     Decreased strength of trunk and back      Physician: Jesse Mejía MD    Visit Date: 3/8/2022    Physician: Jesse Mejía MD     Physician Orders: PT Eval and Treat   Medical Diagnosis from Referral: Low back pain  Evaluation Date: 2022  Authorization Period Expiration: 22  Plan of Care Expiration: 22  Reassessment: 22  Reassessment Due:  visit  Visit # / Visits authorized:  (transfer to [facility] program 21)  [facility] visit      Time In: 10:45 am  Time Out: 11:30am  Total Billable Time: 45 minutes     Precautions: Standard and Diabetes     Pattern of pain determined: pattern 1 PEP      Subjective   Julee reports no LBP today, but left hip continues to be painful. She is going on a trip in May and is concerned about having pain while gone. Back pain is up and down. She feels like she is getting stronger, but feels she has more pain now than she did prior to surgery. She also reports that she feels like her left leg is going to give out when she bends over.    Patient reports tolerating previous visit well with decreased  pain.   Patient reports their pain to be 0/10 on a 0-10 scale with 0 being no pain and 10 being the worst pain imaginable.  Pain Location: L LB      Occupation: retired  Leisure: sewing      Pts goals: no pain    Objective     Baseline Isometric Testing on Med X equipment: Testing administered by PT  Date of testin22  ROM 0-39 deg 3-36 deg   Max Peak Torque 113 93    Min Peak Torque 57 42    Flex/Ext Ratio 2:1 2.2:1   % below normative data 4 32          Outcomes:  Initial visit: 58%  Visit 5: 46%  Visit 10: 50%    Treatment    Pt was instructed in and  "performed the following:     Julee received therapeutic exercises to develop/improved posture, cardiovascular endurance, muscular endurance, lumbar/cervical ROM, strength and muscular endurance for 45 minutes including the following exercises:     MedX dynamic exercise and isometric testing  Prone on elbows 2 min  Press ups   2/10  Bridging 2/10   Standing extension x10  LTR 20x  Piriformis 30" 2x    [facility] 3/8/2022   Visit Number 12   VAS Pain Rating 0   Recumbent Bike Seat Pos. 5   Time 10   Extension in Lying 20   Extension in Standing 10   Lumbar Extension Seat Pad -   Femur Restraint -   Top Dead Center -   Counterweight -   Lumbar Flexion -   Lumbar Extension -   Lumbar Peak Torque -   Min Torque -   Test Percent Below Normative Data -   Test Percent Gain in Strength from Initial  -   Lumbar Weight 48   Repetitions 20   Rating of Perceived Exertion 3   Ice - Z Lie (in min.) -               Peripheral muscle strengthening which included 1 set of 15-20 repetitions at a slow, controlled 10-13 second per rep pace focused on strengthening supporting musculature for improved body mechanics and functional mobility.  Pt and therapist focused on proper form during treatment to ensure optimal strengthening of each targeted muscle group.  Machines were utilized including torso rotation, leg extension, leg curl, chest press, upright row. Tricep extension, bicep curl, leg press, and hip abduction added visit 3    Julee received the following manual therapy techniques: n/a were applied to the: n/a for n/a minutes.         Home Exercises Provided and Patient Education Provided   Home exercises include:  Prone lying 2 min  Press ups on elbows  2/10  Bridging 2/10  Standing extension x10    Cardio program:2/7/22, reviewed again 3/3/22  Lifting education date: 3/3/22  Lumbar roll: has not purchased    Education provided:   - Educated pt on the importance of daily stretch to increase the benefit of program and " "positive results.     Written Home Exercises Provided: yes.  Exercises were reviewed and Julee was able to demonstrate them prior to the end of the session.  Julee demonstrated good  understanding of the education provided.     See EMR under Patient Instructions for exercises provided prior visit.      Assessment:    Continued to encourage daily stretching and cardiovascular exercise. Discussed mechanics of "nerve pain" with pt. She feels she is having nerve pain with certain movements that cause sharp left L-S pain, but is not having radicular sx. She does have some frustration that she is still having pain. Stressed importance of exercise for best outcomes.     Reassessment: 2/24/22:  Patient retested at visit 10 and shows improvement in: pain, ROM, strength and function  Improved posture, currently not using lumbar roll, but plans to purchase  Improved lumbar ROM, 3-36 deg initially on med ex test and 0-39 deg  currently  Improved strength at each test point on lumbar med ex IM test with 54%   average improvement noted with reduced pain  noted by patient  Initial outcome tool score of 58% and current outcome tool score of 50% indicating reduced pain and improved function          Patient is making good progress towards established goals.    Pt will continue to benefit from skilled outpatient physical therapy to address the deficits stated in the impairment chart, provide pt/family education and to maximize pt's level of independence in the home and community environment.     Anticipated Barriers for therapy: none  Pt's spiritual, cultural and educational needs considered and pt agreeable to plan of care and goals as stated below:       GOALS: Pt is in agreement with the following goals.     Short term goals:  6 weeks or 10 visits   1.  Pt will demonstrate increased lumbar ROM by at least 3 degrees from the initial ROM value with improvements noted in functional ROM and ability to perform ADLs.  (approp and " ongoing) MET 2/24/22  2.  Pt will demonstrate increased MedX average isometric strength value  by 20% from initial test resulting in improved ability to perform bending, lifting, and carrying activities safely, confidently.  (approp and ongoing) MET 2/24/22   3.  Patient report a reduction in worst pain score by 1-2 points for improved tolerance for bending and lifting.  (approp and ongoing) MET 2/24/22  4.  Pt able to perform HEP correctly with minimal cueing or supervision from therapist to encourage independent management of symptoms. (approp and ongoing) MET 2/24/22        Long term goals: 10 weeks or 20 visits   1. Pt will demonstrate increased lumbar ROM by at least 6 degrees from initial ROM value, resulting in improved ability to perform functional fwd bending while standing and sitting. (approp and ongoing) MET 2/24/22  2. Pt will demonstrate increased MedX average isometric strength value  by 30% from initial test resulting in improved ability to perform bending, lifting, and carrying activities safely, confidently.  (approp and ongoing) PROGRESSING  3. Pt to demonstrate ability to independently control and reduce their pain through posture positioning and mechanical movements throughout a typical day.  (approp and ongoing) PROGRESSING  4.  Pt will demonstrate reduced pain and improved functional outcomes as reported on the FOTO by reaching a limitation score of < or = 50% or less in order to demonstrate subjective improvement in pt's condition.(approp and ongoing) MET 2/24/22  5. Pt will demonstrate independence with the HEP at discharge  (approp and ongoing)  6.  Pt will have no pain with daily ADLs and will increase overall strength(patient goal)  (approp and ongoing)       Plan   Continue with established Plan of Care towards established PT goals.

## 2022-03-11 ENCOUNTER — CLINICAL SUPPORT (OUTPATIENT)
Dept: REHABILITATION | Facility: HOSPITAL | Age: 72
End: 2022-03-11
Payer: MEDICARE

## 2022-03-11 DIAGNOSIS — G89.29 CHRONIC MIDLINE LOW BACK PAIN WITHOUT SCIATICA: ICD-10-CM

## 2022-03-11 DIAGNOSIS — R29.898 DECREASED STRENGTH OF TRUNK AND BACK: ICD-10-CM

## 2022-03-11 DIAGNOSIS — M25.69 DECREASED ROM OF TRUNK AND BACK: Primary | ICD-10-CM

## 2022-03-11 DIAGNOSIS — M54.50 CHRONIC MIDLINE LOW BACK PAIN WITHOUT SCIATICA: ICD-10-CM

## 2022-03-11 PROCEDURE — 97110 THERAPEUTIC EXERCISES: CPT | Mod: PO,CQ

## 2022-03-11 NOTE — PROGRESS NOTES
" [facility]      Name: Julee Bee  Clinic Number: [id number]    Therapy Diagnosis:   No diagnosis found.  Physician: Jesse Mejía MD    Visit Date: 3/11/2022    Physician: Jesse Mejía MD     Physician Orders: PT Eval and Treat   Medical Diagnosis from Referral: Low back pain  Evaluation Date: 2022  Authorization Period Expiration: 22  Plan of Care Expiration: 22  Reassessment: 22  Reassessment Due:  visit  Visit # / Visits authorized: 24mo[  / 40 (transfer to [facility] program 21)  [facility] visit      Time In: 10:45 am  Time Out: 11:30am  Total Billable Time: 40 minutes     Precautions: Standard and Diabetes     Pattern of pain determined: pattern 1 PEP      Subjective   Julee report less hip pain, minimal LBP.     Patient reports tolerating previous visit well with decreased  pain.   Patient reports their pain to be 0/10 on a 0-10 scale with 0 being no pain and 10 being the worst pain imaginable.  Pain Location: L LB      Occupation: retired  Leisure: sewing      Pts goals: no pain    Objective     Baseline Isometric Testing on Med X equipment: Testing administered by PT  Date of testin22  ROM 0-39 deg 3-36 deg   Max Peak Torque 113 93    Min Peak Torque 57 42    Flex/Ext Ratio 2:1 2.2:1   % below normative data 4 32          Outcomes:  Initial visit: 58%  Visit 5: 46%  Visit 10: 50%    Treatment    Pt was instructed in and performed the following:     Julee received therapeutic exercises to develop/improved posture, cardiovascular endurance, muscular endurance, lumbar/cervical ROM, strength and muscular endurance for 45 minutes including the following exercises:     MedX dynamic exercise and isometric testing  Prone on elbows 2 min  Press ups   2/10  Bridging 2/10   Standing extension x10  LTR 20x  Piriformis 30" 2x  [facility] " 3/11/2022   Visit Number 13   VAS Pain Rating 2   Recumbent Bike Seat Pos. 5   Time 10   Extension in Lying 20   Extension in Standing 10   Lumbar Extension Seat Pad -   Femur Restraint -   Top Dead Center -   Counterweight -   Lumbar Flexion -   Lumbar Extension -   Lumbar Peak Torque -   Min Torque -   Test Percent Below Normative Data -   Test Percent Gain in Strength from Initial  -   Lumbar Weight 51   Repetitions 15   Rating of Perceived Exertion 4   Ice - Z Lie (in min.) -         Peripheral muscle strengthening which included 1 set of 15-20 repetitions at a slow, controlled 10-13 second per rep pace focused on strengthening supporting musculature for improved body mechanics and functional mobility.  Pt and therapist focused on proper form during treatment to ensure optimal strengthening of each targeted muscle group.  Machines were utilized including torso rotation, leg extension, leg curl, chest press, upright row. Tricep extension, bicep curl, leg press, and hip abduction added visit 3    Julee received the following manual therapy techniques: n/a were applied to the: n/a for n/a minutes.         Home Exercises Provided and Patient Education Provided   Home exercises include:  Prone lying 2 min  Press ups on elbows  2/10  Bridging 2/10  Standing extension x10    Cardio program:2/7/22, reviewed again 3/3/22  Lifting education date: 3/3/22  Lumbar roll: has not purchased    Education provided:   - Educated pt on the importance of daily stretch to increase the benefit of program and positive results.     Written Home Exercises Provided: yes.  Exercises were reviewed and Julee was able to demonstrate them prior to the end of the session.  Julee demonstrated good  understanding of the education provided.     See EMR under Patient Instructions for exercises provided prior visit.      Assessment:    Continued to encourage daily stretching and cardiovascular exercise. Pt did 15 reps with a weight increase and no  c/o pain. Weight is becoming challenging for pt. Stressed importance of exercise for best outcomes.     Reassessment: 2/24/22:  Patient retested at visit 10 and shows improvement in: pain, ROM, strength and function  Improved posture, currently not using lumbar roll, but plans to purchase  Improved lumbar ROM, 3-36 deg initially on med ex test and 0-39 deg  currently  Improved strength at each test point on lumbar med ex IM test with 54%   average improvement noted with reduced pain  noted by patient  Initial outcome tool score of 58% and current outcome tool score of 50% indicating reduced pain and improved function          Patient is making good progress towards established goals.    Pt will continue to benefit from skilled outpatient physical therapy to address the deficits stated in the impairment chart, provide pt/family education and to maximize pt's level of independence in the home and community environment.     Anticipated Barriers for therapy: none  Pt's spiritual, cultural and educational needs considered and pt agreeable to plan of care and goals as stated below:       GOALS: Pt is in agreement with the following goals.     Short term goals:  6 weeks or 10 visits   1.  Pt will demonstrate increased lumbar ROM by at least 3 degrees from the initial ROM value with improvements noted in functional ROM and ability to perform ADLs.  (approp and ongoing) MET 2/24/22  2.  Pt will demonstrate increased MedX average isometric strength value  by 20% from initial test resulting in improved ability to perform bending, lifting, and carrying activities safely, confidently.  (approp and ongoing) MET 2/24/22   3.  Patient report a reduction in worst pain score by 1-2 points for improved tolerance for bending and lifting.  (approp and ongoing) MET 2/24/22  4.  Pt able to perform HEP correctly with minimal cueing or supervision from therapist to encourage independent management of symptoms. (approp and ongoing) MET  2/24/22        Long term goals: 10 weeks or 20 visits   1. Pt will demonstrate increased lumbar ROM by at least 6 degrees from initial ROM value, resulting in improved ability to perform functional fwd bending while standing and sitting. (approp and ongoing) MET 2/24/22  2. Pt will demonstrate increased MedX average isometric strength value  by 30% from initial test resulting in improved ability to perform bending, lifting, and carrying activities safely, confidently.  (approp and ongoing) PROGRESSING  3. Pt to demonstrate ability to independently control and reduce their pain through posture positioning and mechanical movements throughout a typical day.  (approp and ongoing) PROGRESSING  4.  Pt will demonstrate reduced pain and improved functional outcomes as rep                                                                                                                                                                             orted on the FOTO by reaching a limitation score of < or = 50% or less in order to demonstrate subjective improvement in pt's condition.(approp and ongoing) MET 2/24/22  5. Pt will demonstrate independence with the HEP at discharge  (approp and ongoing)  6.  Pt will have no pain with daily ADLs and will increase overall strength(patient goal)  (approp and ongoing)       Plan   Continue with established Plan of Care towards established PT goals.

## 2022-03-15 ENCOUNTER — CLINICAL SUPPORT (OUTPATIENT)
Dept: REHABILITATION | Facility: HOSPITAL | Age: 72
End: 2022-03-15
Payer: MEDICARE

## 2022-03-15 DIAGNOSIS — M25.69 DECREASED ROM OF TRUNK AND BACK: Primary | ICD-10-CM

## 2022-03-15 DIAGNOSIS — M54.50 CHRONIC MIDLINE LOW BACK PAIN WITHOUT SCIATICA: ICD-10-CM

## 2022-03-15 DIAGNOSIS — G89.29 CHRONIC MIDLINE LOW BACK PAIN WITHOUT SCIATICA: ICD-10-CM

## 2022-03-15 DIAGNOSIS — R29.898 DECREASED STRENGTH OF TRUNK AND BACK: ICD-10-CM

## 2022-03-15 PROCEDURE — 97110 THERAPEUTIC EXERCISES: CPT | Mod: PO | Performed by: PHYSICAL THERAPIST

## 2022-03-15 NOTE — PROGRESS NOTES
[facility]      Name: Julee Bee  Clinic Number: [id number]    Therapy Diagnosis:   Encounter Diagnoses   Name Primary?    Decreased ROM of trunk and back Yes    Chronic midline low back pain without sciatica     Decreased strength of trunk and back      Physician: Jesse Mejía MD    Visit Date: 3/15/2022    Physician: Jesse Mejía MD     Physician Orders: PT Eval and Treat   Medical Diagnosis from Referral: Low back pain  Evaluation Date: 2022  Authorization Period Expiration: 22  Plan of Care Expiration: 22  Reassessment: 22  Reassessment Due:  visit  Visit # / Visits authorized:  (transfer to [facility] program 21)  [facility] visit      Time In: 10:40 am  Time Out: 11:30am  Total Billable Time: 50 minutes     Precautions: Standard and Diabetes     Pattern of pain determined: pattern 1 PEP      Subjective   Julee report no pain currently. She is going to discuss surgery on right hip with Dr. Rodrigues. She is concerned about being able to walk on upcoming trip this summer.    Patient reports tolerating previous visit well with decreased  pain.   Patient reports their pain to be 0/10 on a 0-10 scale with 0 being no pain and 10 being the worst pain imaginable.  Pain Location: L LB      Occupation: retired  Leisure: sewing      Pts goals: no pain    Objective     Baseline Isometric Testing on Med X equipment: Testing administered by PT  Date of testin22  ROM 0-39 deg 3-36 deg   Max Peak Torque 113 93    Min Peak Torque 57 42    Flex/Ext Ratio 2:1 2.2:1   % below normative data 4 32          Outcomes:  Initial visit: 58%  Visit 5: 46%  Visit 10: 50%    Treatment    Pt was instructed in and performed the following:     Julee received therapeutic exercises to develop/improved posture, cardiovascular endurance, muscular endurance,  "lumbar/cervical ROM, strength and muscular endurance for 50 minutes including the following exercises:     MedX dynamic exercise and isometric testing  Prone on elbows 2 min  Press ups   2/10  Bridging 2/10   Standing extension x10  LTR 20x  Piriformis 30" 2x    [facility] 3/15/2022   Visit Number 14   VAS Pain Rating 0   Recumbent Bike Seat Pos. 5   Time 10   Extension in Lying 20   Extension in Standing 10   Lumbar Extension Seat Pad -   Femur Restraint -   Top Dead Center -   Counterweight -   Lumbar Flexion -   Lumbar Extension -   Lumbar Peak Torque -   Min Torque -   Test Percent Below Normative Data -   Test Percent Gain in Strength from Initial  -   Lumbar Weight 51   Repetitions 20   Rating of Perceived Exertion 3   Ice - Z Lie (in min.) -           Peripheral muscle strengthening which included 1 set of 15-20 repetitions at a slow, controlled 10-13 second per rep pace focused on strengthening supporting musculature for improved body mechanics and functional mobility.  Pt and therapist focused on proper form during treatment to ensure optimal strengthening of each targeted muscle group.  Machines were utilized including torso rotation, leg extension, leg curl, chest press, upright row. Tricep extension, bicep curl, leg press, and hip abduction added visit 3    Julee received the following manual therapy techniques: n/a were applied to the: n/a for n/a minutes.         Home Exercises Provided and Patient Education Provided   Home exercises include:  Prone lying 2 min  Press ups on elbows  2/10  Bridging 2/10  Standing extension x10    Cardio program:2/7/22, reviewed again 3/3/22  Lifting education date: 3/3/22  Lumbar roll: has not purchased    Education provided:   - Educated pt on the importance of daily stretch to increase the benefit of program and positive results.     Written Home Exercises Provided: yes.  Exercises were reviewed and Julee was able to demonstrate them prior to the end of the " session.  Julee demonstrated good  understanding of the education provided.     See EMR under Patient Instructions for exercises provided prior visit.      Assessment:    Continuing to progress with reps or resistance with strengthening exercises. Continue to encourage daily stretching and cardiovascular exercise 4-5 days/week. Able to complete 20 reps on MEDX with 3/10 perceived exertion. Should be able to increase resistance next visit.     Reassessment: 2/24/22:  Patient retested at visit 10 and shows improvement in: pain, ROM, strength and function  Improved posture, currently not using lumbar roll, but plans to purchase  Improved lumbar ROM, 3-36 deg initially on med ex test and 0-39 deg  currently  Improved strength at each test point on lumbar med ex IM test with 54%   average improvement noted with reduced pain  noted by patient  Initial outcome tool score of 58% and current outcome tool score of 50% indicating reduced pain and improved function          Patient is making good progress towards established goals.    Pt will continue to benefit from skilled outpatient physical therapy to address the deficits stated in the impairment chart, provide pt/family education and to maximize pt's level of independence in the home and community environment.     Anticipated Barriers for therapy: none  Pt's spiritual, cultural and educational needs considered and pt agreeable to plan of care and goals as stated below:       GOALS: Pt is in agreement with the following goals.     Short term goals:  6 weeks or 10 visits   1.  Pt will demonstrate increased lumbar ROM by at least 3 degrees from the initial ROM value with improvements noted in functional ROM and ability to perform ADLs.  (approp and ongoing) MET 2/24/22  2.  Pt will demonstrate increased MedX average isometric strength value  by 20% from initial test resulting in improved ability to perform bending, lifting, and carrying activities safely, confidently.   (approp and ongoing) MET 2/24/22   3.  Patient report a reduction in worst pain score by 1-2 points for improved tolerance for bending and lifting.  (approp and ongoing) MET 2/24/22  4.  Pt able to perform HEP correctly with minimal cueing or supervision from therapist to encourage independent management of symptoms. (approp and ongoing) MET 2/24/22        Long term goals: 10 weeks or 20 visits   1. Pt will demonstrate increased lumbar ROM by at least 6 degrees from initial ROM value, resulting in improved ability to perform functional fwd bending while standing and sitting. (approp and ongoing) MET 2/24/22  2. Pt will demonstrate increased MedX average isometric strength value  by 30% from initial test resulting in improved ability to perform bending, lifting, and carrying activities safely, confidently.  (approp and ongoing) PROGRESSING  3. Pt to demonstrate ability to independently control and reduce their pain through posture positioning and mechanical movements throughout a typical day.  (approp and ongoing) PROGRESSING  4.  Pt will demonstrate reduced pain and improved functional outcomes as rep                                                                                                                                                                             orted on the FOTO by reaching a limitation score of < or = 50% or less in order to demonstrate subjective improvement in pt's condition.(approp and ongoing) MET 2/24/22  5. Pt will demonstrate independence with the HEP at discharge  (approp and ongoing)  6.  Pt will have no pain with daily ADLs and will increase overall strength(patient goal)  (approp and ongoing)       Plan   Continue with established Plan of Care towards established PT goals.

## 2022-03-17 ENCOUNTER — CLINICAL SUPPORT (OUTPATIENT)
Dept: REHABILITATION | Facility: HOSPITAL | Age: 72
End: 2022-03-17
Payer: MEDICARE

## 2022-03-17 DIAGNOSIS — M54.50 CHRONIC MIDLINE LOW BACK PAIN WITHOUT SCIATICA: ICD-10-CM

## 2022-03-17 DIAGNOSIS — M25.69 DECREASED ROM OF TRUNK AND BACK: Primary | ICD-10-CM

## 2022-03-17 DIAGNOSIS — G89.29 CHRONIC MIDLINE LOW BACK PAIN WITHOUT SCIATICA: ICD-10-CM

## 2022-03-17 DIAGNOSIS — R29.898 DECREASED STRENGTH OF TRUNK AND BACK: ICD-10-CM

## 2022-03-17 PROCEDURE — 97110 THERAPEUTIC EXERCISES: CPT | Mod: PO,CQ

## 2022-03-17 NOTE — PROGRESS NOTES
[facility]      Name: Julee Bee  Clinic Number: [id number]    Therapy Diagnosis:   Encounter Diagnoses   Name Primary?    Decreased ROM of trunk and back Yes    Chronic midline low back pain without sciatica     Decreased strength of trunk and back      Physician: Jesse Mejía MD    Visit Date: 3/17/2022    Physician: Jesse Mejía MD     Physician Orders: PT Eval and Treat   Medical Diagnosis from Referral: Low back pain  Evaluation Date: 2022  Authorization Period Expiration: 22  Plan of Care Expiration: 22  Reassessment: 22  Reassessment Due:  visit  Visit # / Visits authorized:  (transfer to [facility] program 21)  [facility] visit      Time In: 10:45 am  Time Out: 11:41am  Total Billable Time: 50 minutes     Precautions: Standard and Diabetes     Pattern of pain determined: pattern 1 PEP      Subjective   Julee report no LBP, just soreness. She is complaint with her HEP.     Patient reports tolerating previous visit well with decreased  pain.   Patient reports their pain to be 0/10 on a 0-10 scale with 0 being no pain and 10 being the worst pain imaginable.  Pain Location: L LB      Occupation: retired  Leisure: sewing      Pts goals: no pain    Objective     Baseline Isometric Testing on Med X equipment: Testing administered by PT  Date of testin22  ROM 0-39 deg 3-36 deg   Max Peak Torque 113 93    Min Peak Torque 57 42    Flex/Ext Ratio 2:1 2.2:1   % below normative data 4 32          Outcomes:  Initial visit: 58%  Visit 5: 46%  Visit 10: 50%    Treatment    Pt was instructed in and performed the following:     Julee received therapeutic exercises to develop/improved posture, cardiovascular endurance, muscular endurance, lumbar/cervical ROM, strength and muscular endurance for 50 minutes including the following exercises:  "    MedX dynamic exercise and isometric testing  Prone on elbows 2 min  Press ups   2/10 (NP)  Bridging 2/10   Standing extension x10  LTR 20x  Piriformis 30" 2x  [facility] 3/17/2022   Visit Number 15   VAS Pain Rating 2   Recumbent Bike Seat Pos. 5   Time 10   Extension in Lying 20   Extension in Standing 10   Lumbar Extension Seat Pad -   Femur Restraint -   Top Dead Center -   Counterweight -   Lumbar Flexion -   Lumbar Extension -   Lumbar Peak Torque -   Min Torque -   Test Percent Below Normative Data -   Test Percent Gain in Strength from Initial  -   Lumbar Weight 54   Repetitions 15   Rating of Perceived Exertion 3   Ice - Z Lie (in min.) -           Peripheral muscle strengthening which included 1 set of 15-20 repetitions at a slow, controlled 10-13 second per rep pace focused on strengthening supporting musculature for improved body mechanics and functional mobility.  Pt and therapist focused on proper form during treatment to ensure optimal strengthening of each targeted muscle group.  Machines were utilized including torso rotation, leg extension, leg curl, chest press, upright row. Tricep extension, bicep curl, leg press, and hip abduction added visit 3    Julee received the following manual therapy techniques: n/a were applied to the: n/a for n/a minutes.         Home Exercises Provided and Patient Education Provided   Home exercises include:  Prone lying 2 min  Press ups on elbows  2/10  Bridging 2/10  Standing extension x10    Cardio program:2/7/22, reviewed again 3/3/22  Lifting education date: 3/3/22  Lumbar roll: has not purchased    Education provided:   - Educated pt on the importance of daily stretch to increase the benefit of program and positive results.     Written Home Exercises Provided: yes.  Exercises were reviewed and Julee was able to demonstrate them prior to the end of the session.  Julee demonstrated good  understanding of the education provided.     See EMR under Patient " Instructions for exercises provided prior visit.      Assessment:    Continuing to progress with reps or resistance with strengthening exercises. Pt had to stop prone extension due to pt c/o pain down LE. Continue to encourage daily stretching and cardiovascular exercise 4-5 days/week. Able to complete 15 reps on MEDX with a weight increase 3/10 perceived exertion.      Reassessment: 2/24/22:  Patient retested at visit 10 and shows improvement in: pain, ROM, strength and function  Improved posture, currently not using lumbar roll, but plans to purchase  Improved lumbar ROM, 3-36 deg initially on med ex test and 0-39 deg  currently  Improved strength at each test point on lumbar med ex IM test with 54%   average improvement noted with reduced pain  noted by patient  Initial outcome tool score of 58% and current outcome tool score of 50% indicating reduced pain and improved function          Patient is making good progress towards established goals.    Pt will continue to benefit from skilled outpatient physical therapy to address the deficits stated in the impairment chart, provide pt/family education and to maximize pt's level of independence in the home and community environment.     Anticipated Barriers for therapy: none  Pt's spiritual, cultural and educational needs considered and pt agreeable to plan of care and goals as stated below:       GOALS: Pt is in agreement with the following goals.     Short term goals:  6 weeks or 10 visits   1.  Pt will demonstrate increased lumbar ROM by at least 3 degrees from the initial ROM value with improvements noted in functional ROM and ability to perform ADLs.  (approp and ongoing) MET 2/24/22  2.  Pt will demonstrate increased MedX average isometric strength value  by 20% from initial test resulting in improved ability to perform bending, lifting, and carrying activities safely, confidently.  (approp and ongoing) MET 2/24/22   3.  Patient report a reduction in worst pain  score by 1-2 points for improved tolerance for bending and lifting.  (approp and ongoing) MET 2/24/22  4.  Pt able to perform HEP correctly with minimal cueing or supervision from therapist to encourage independent management of symptoms. (approp and ongoing) MET 2/24/22        Long term goals: 10 weeks or 20 visits   1. Pt will demonstrate increased lumbar ROM by at least 6 degrees from initial ROM value, resulting in improved ability to perform functional fwd bending while standing and sitting. (approp and ongoing) MET 2/24/22  2. Pt will demonstrate increased MedX average isometric strength value  by 30% from initial test resulting in improved ability to perform bending, lifting, and carrying activities safely, confidently.  (approp and ongoing) PROGRESSING  3. Pt to demonstrate ability to independently control and reduce their pain through posture positioning and mechanical movements throughout a typical day.  (approp and ongoing) PROGRESSING  4.  Pt will demonstrate reduced pain and improved functional outcomes as rep                                                                                                                                                                             orted on the FOTO by reaching a limitation score of < or = 50% or less in order to demonstrate subjective improvement in pt's condition.(approp and ongoing) MET 2/24/22  5. Pt will demonstrate independence with the HEP at discharge  (approp and ongoing)  6.  Pt will have no pain with daily ADLs and will increase overall strength(patient goal)  (approp and ongoing)       Plan   Continue with established Plan of Care towards established PT goals.

## 2022-03-22 ENCOUNTER — CLINICAL SUPPORT (OUTPATIENT)
Dept: REHABILITATION | Facility: HOSPITAL | Age: 72
End: 2022-03-22
Payer: MEDICARE

## 2022-03-22 DIAGNOSIS — G89.29 CHRONIC MIDLINE LOW BACK PAIN WITHOUT SCIATICA: ICD-10-CM

## 2022-03-22 DIAGNOSIS — R29.898 DECREASED STRENGTH OF TRUNK AND BACK: ICD-10-CM

## 2022-03-22 DIAGNOSIS — M54.50 CHRONIC MIDLINE LOW BACK PAIN WITHOUT SCIATICA: ICD-10-CM

## 2022-03-22 DIAGNOSIS — M25.69 DECREASED ROM OF TRUNK AND BACK: Primary | ICD-10-CM

## 2022-03-22 PROCEDURE — 97110 THERAPEUTIC EXERCISES: CPT | Mod: PO | Performed by: PHYSICAL THERAPIST

## 2022-03-22 NOTE — PROGRESS NOTES
[facility]      Name: Julee Bee  Clinic Number: [id number]    Therapy Diagnosis:   Encounter Diagnoses   Name Primary?    Decreased ROM of trunk and back Yes    Chronic midline low back pain without sciatica     Decreased strength of trunk and back      Physician: Jesse Mejía MD    Visit Date: 3/22/2022    Physician: Jesse Mejía MD     Physician Orders: PT Eval and Treat   Medical Diagnosis from Referral: Low back pain  Evaluation Date: 2022  Authorization Period Expiration: 22  Plan of Care Expiration: 22  Reassessment: 22  Reassessment Due:  visit  Visit # / Visits authorized:  (transfer to [facility] program 21)  [facility] visit      Time In: 10:45 am  Time Out: 11:30am  Total Billable Time: 45 minutes     Precautions: Standard and Diabetes     Pattern of pain determined: pattern 1 PEP      Subjective   Julee report very minimal low back pain. She is aware of some increased strength overall.    Patient reports tolerating previous visit well with decreased  pain.   Patient reports their pain to be 1-2/10 on a 0-10 scale with 0 being no pain and 10 being the worst pain imaginable.  Pain Location: L LB      Occupation: retired  Leisure: sewing      Pts goals: no pain    Objective     Baseline Isometric Testing on Med X equipment: Testing administered by PT  Date of testin22  ROM 0-39 deg 3-36 deg   Max Peak Torque 113 93    Min Peak Torque 57 42    Flex/Ext Ratio 2:1 2.2:1   % below normative data 4 32          Outcomes:  Initial visit: 58%  Visit 5: 46%  Visit 10: 50%    Treatment    Pt was instructed in and performed the following:     Julee received therapeutic exercises to develop/improved posture, cardiovascular endurance, muscular endurance, lumbar/cervical ROM, strength and muscular endurance for 45 minutes including the  "following exercises:     MedX dynamic exercise and isometric testing  Prone on elbows 2 min  Press ups   2/10   Bridging 2/10   Standing extension x10  LTR 20x  Piriformis 30" 2x    [facility] 3/22/2022   Visit Number 16   VAS Pain Rating 1   Recumbent Bike Seat Pos. 5   Time 10   Extension in Lying 20   Extension in Standing 10   Lumbar Extension Seat Pad -   Femur Restraint -   Top Dead Center -   Counterweight -   Lumbar Flexion -   Lumbar Extension -   Lumbar Peak Torque -   Min Torque -   Test Percent Below Normative Data -   Test Percent Gain in Strength from Initial  -   Lumbar Weight 54   Repetitions 18   Rating of Perceived Exertion 4   Ice - Z Lie (in min.) -             Peripheral muscle strengthening which included 1 set of 15-20 repetitions at a slow, controlled 10-13 second per rep pace focused on strengthening supporting musculature for improved body mechanics and functional mobility.  Pt and therapist focused on proper form during treatment to ensure optimal strengthening of each targeted muscle group.  Machines were utilized including torso rotation, leg extension, leg curl, chest press, upright row. Tricep extension, bicep curl, leg press, and hip abduction added visit 3    Julee received the following manual therapy techniques: n/a were applied to the: n/a for n/a minutes.         Home Exercises Provided and Patient Education Provided   Home exercises include:  Prone lying 2 min  Press ups on elbows  2/10  Bridging 2/10  Standing extension x10    Cardio program:2/7/22, reviewed again 3/3/22  Lifting education date: 3/3/22  Lumbar roll: has not purchased    Education provided:   - Educated pt on the importance of daily stretch to increase the benefit of program and positive results.     Written Home Exercises Provided: yes.  Exercises were reviewed and Julee was able to demonstrate them prior to the end of the session.  Julee demonstrated good  understanding of the education provided. "     See EMR under Patient Instructions for exercises provided prior visit.      Assessment:    Pt struggles to complete exercises, but with appropriate perceived exertion. Encouraged and cued pt to focus on completing full range with exercises. Improved lumbar extension with prone exercise.     Reassessment: 2/24/22:  Patient retested at visit 10 and shows improvement in: pain, ROM, strength and function  Improved posture, currently not using lumbar roll, but plans to purchase  Improved lumbar ROM, 3-36 deg initially on med ex test and 0-39 deg  currently  Improved strength at each test point on lumbar med ex IM test with 54%   average improvement noted with reduced pain  noted by patient  Initial outcome tool score of 58% and current outcome tool score of 50% indicating reduced pain and improved function          Patient is making good progress towards established goals.    Pt will continue to benefit from skilled outpatient physical therapy to address the deficits stated in the impairment chart, provide pt/family education and to maximize pt's level of independence in the home and community environment.     Anticipated Barriers for therapy: none  Pt's spiritual, cultural and educational needs considered and pt agreeable to plan of care and goals as stated below:       GOALS: Pt is in agreement with the following goals.     Short term goals:  6 weeks or 10 visits   1.  Pt will demonstrate increased lumbar ROM by at least 3 degrees from the initial ROM value with improvements noted in functional ROM and ability to perform ADLs.  (approp and ongoing) MET 2/24/22  2.  Pt will demonstrate increased MedX average isometric strength value  by 20% from initial test resulting in improved ability to perform bending, lifting, and carrying activities safely, confidently.  (approp and ongoing) MET 2/24/22   3.  Patient report a reduction in worst pain score by 1-2 points for improved tolerance for bending and lifting.  (approp  and ongoing) MET 2/24/22  4.  Pt able to perform HEP correctly with minimal cueing or supervision from therapist to encourage independent management of symptoms. (approp and ongoing) MET 2/24/22        Long term goals: 10 weeks or 20 visits   1. Pt will demonstrate increased lumbar ROM by at least 6 degrees from initial ROM value, resulting in improved ability to perform functional fwd bending while standing and sitting. (approp and ongoing) MET 2/24/22  2. Pt will demonstrate increased MedX average isometric strength value  by 30% from initial test resulting in improved ability to perform bending, lifting, and carrying activities safely, confidently.  (approp and ongoing) PROGRESSING  3. Pt to demonstrate ability to independently control and reduce their pain through posture positioning and mechanical movements throughout a typical day.  (approp and ongoing) PROGRESSING  4.  Pt will demonstrate reduced pain and improved functional outcomes as rep                                                                                                                                                                             orted on the FOTO by reaching a limitation score of < or = 50% or less in order to demonstrate subjective improvement in pt's condition.(approp and ongoing) MET 2/24/22  5. Pt will demonstrate independence with the HEP at discharge  (approp and ongoing)  6.  Pt will have no pain with daily ADLs and will increase overall strength(patient goal)  (approp and ongoing)       Plan   Continue with established Plan of Care towards established PT goals.

## 2022-03-24 ENCOUNTER — CLINICAL SUPPORT (OUTPATIENT)
Dept: REHABILITATION | Facility: HOSPITAL | Age: 72
End: 2022-03-24
Payer: MEDICARE

## 2022-03-24 DIAGNOSIS — M25.69 DECREASED ROM OF TRUNK AND BACK: Primary | ICD-10-CM

## 2022-03-24 DIAGNOSIS — M54.50 CHRONIC MIDLINE LOW BACK PAIN WITHOUT SCIATICA: ICD-10-CM

## 2022-03-24 DIAGNOSIS — R29.898 DECREASED STRENGTH OF TRUNK AND BACK: ICD-10-CM

## 2022-03-24 DIAGNOSIS — G89.29 CHRONIC MIDLINE LOW BACK PAIN WITHOUT SCIATICA: ICD-10-CM

## 2022-03-24 PROCEDURE — 97110 THERAPEUTIC EXERCISES: CPT | Mod: PO,CQ

## 2022-03-24 NOTE — PROGRESS NOTES
[facility]      Name: Julee Bee  Clinic Number: [id number]    Therapy Diagnosis:   Encounter Diagnoses   Name Primary?    Decreased ROM of trunk and back Yes    Chronic midline low back pain without sciatica     Decreased strength of trunk and back      Physician: Jesse Mejía MD    Visit Date: 3/24/2022    Physician: Jesse Mejía MD     Physician Orders: PT Eval and Treat   Medical Diagnosis from Referral: Low back pain  Evaluation Date: 2022  Authorization Period Expiration: 22  Plan of Care Expiration: 22  Reassessment: 22  Reassessment Due:  visit  Visit # / Visits authorized:  (transfer to [facility] program 21)  [facility] visit      Time In: 10:45 am  Time Out: 11:30am  Total Billable Time: 40 minutes     Precautions: Standard and Diabetes     Pattern of pain determined: pattern 1 PEP      Subjective   Julee report very minimal low back pain, but L LE IT band bothered her more.   Patient reports tolerating previous visit well with decreased  pain.   Patient reports their pain to be 1-2/10 on a 0-10 scale with 0 being no pain and 10 being the worst pain imaginable.  Pain Location: L LB      Occupation: retired  Leisure: sewing      Pts goals: no pain    Objective     Baseline Isometric Testing on Med X equipment: Testing administered by PT  Date of testin22  ROM 0-39 deg 3-36 deg   Max Peak Torque 113 93    Min Peak Torque 57 42    Flex/Ext Ratio 2:1 2.2:1   % below normative data 4 32          Outcomes:  Initial visit: 58%  Visit 5: 46%  Visit 10: 50%    Treatment    Pt was instructed in and performed the following:     Julee received therapeutic exercises to develop/improved posture, cardiovascular endurance, muscular endurance, lumbar/cervical ROM, strength and muscular endurance for 45 minutes including the following  "exercises:     MedX dynamic exercise and isometric testing  Prone on elbows 2 min  Press ups   2/10 (decrease range 2/2 pain)  Bridging 2/10   Standing extension x10  LTR 20x  Piriformis 30" 2x  [facility] 3/24/2022   Visit Number 17   VAS Pain Rating 2   Recumbent Bike Seat Pos. 5   Time 10   Extension in Lying 20   Extension in Standing 10   Manual Therapy 5   Lumbar Extension Seat Pad -   Femur Restraint -   Top Dead Center -   Counterweight -   Lumbar Flexion -   Lumbar Extension -   Lumbar Peak Torque -   Min Torque -   Test Percent Below Normative Data -   Test Percent Gain in Strength from Initial  -   Lumbar Weight 54   Repetitions 20   Rating of Perceived Exertion 4   Ice - Z Lie (in min.) -       Peripheral muscle strengthening which included 1 set of 15-20 repetitions at a slow, controlled 10-13 second per rep pace focused on strengthening supporting musculature for improved body mechanics and functional mobility.  Pt and therapist focused on proper form during treatment to ensure optimal strengthening of each targeted muscle group.  Machines were utilized including torso rotation, leg extension, leg curl, chest press, upright row. Tricep extension, bicep curl, leg press, and hip abduction added visit 3    Julee received the following manual therapy techniques: STM to L IT band area for 5 mins w/ pt supine in increase blood flow and soften muscle.         Home Exercises Provided and Patient Education Provided   Home exercises include:  Prone lying 2 min  Press ups on elbows  2/10  Bridging 2/10  Standing extension x10    Cardio program:2/7/22, reviewed again 3/3/22  Lifting education date: 3/3/22  Lumbar roll: has not purchased    Education provided:   - Educated pt on the importance of daily stretch to increase the benefit of program and positive results.     Written Home Exercises Provided: yes.  Exercises were reviewed and Julee was able to demonstrate them prior to the end of the session.  " Julee demonstrated good  understanding of the education provided.     See EMR under Patient Instructions for exercises provided prior visit.      Assessment:    Pt is being challenged by all therex. Pt limited with prone ext 2* pain she gets in her LE. Pt's LBP has gotten better, but still has L LE lateral thigh pain.     Reassessment: 2/24/22:  Patient retested at visit 10 and shows improvement in: pain, ROM, strength and function  Improved posture, currently not using lumbar roll, but plans to purchase  Improved lumbar ROM, 3-36 deg initially on med ex test and 0-39 deg  currently  Improved strength at each test point on lumbar med ex IM test with 54%   average improvement noted with reduced pain  noted by patient  Initial outcome tool score of 58% and current outcome tool score of 50% indicating reduced pain and improved function          Patient is making good progress towards established goals.    Pt will continue to benefit from skilled outpatient physical therapy to address the deficits stated in the impairment chart, provide pt/family education and to maximize pt's level of independence in the home and community environment.     Anticipated Barriers for therapy: none  Pt's spiritual, cultural and educational needs considered and pt agreeable to plan of care and goals as stated below:       GOALS: Pt is in agreement with the following goals.     Short term goals:  6 weeks or 10 visits   1.  Pt will demonstrate increased lumbar ROM by at least 3 degrees from the initial ROM value with improvements noted in functional ROM and ability to perform ADLs.  (approp and ongoing) MET 2/24/22  2.  Pt will demonstrate increased MedX average isometric strength value  by 20% from initial test resulting in improved ability to perform bending, lifting, and carrying activities safely, confidently.  (approp and ongoing) MET 2/24/22   3.  Patient report a reduction in worst pain score by 1-2 points for improved tolerance for  bending and lifting.  (approp and ongoing) MET 2/24/22  4.  Pt able to perform HEP correctly with minimal cueing or supervision from therapist to encourage independent management of symptoms. (approp and ongoing) MET 2/24/22        Long term goals: 10 weeks or 20 visits   1. Pt will demonstrate increased lumbar ROM by at least 6 degrees from initial ROM value, resulting in improved ability to perform functional fwd bending while standing and sitting. (approp and ongoing) MET 2/24/22  2. Pt will demonstrate increased MedX average isometric strength value  by 30% from initial test resulting in improved ability to perform bending, lifting, and carrying activities safely, confidently.  (approp and ongoing) PROGRESSING  3. Pt to demonstrate ability to independently control and reduce their pain through posture positioning and mechanical movements throughout a typical day.  (approp and ongoing) PROGRESSING  4.  Pt will demonstrate reduced pain and improved functional outcomes as rep                                                                                                                                                                             orted on the FOTO by reaching a limitation score of < or = 50% or less in order to demonstrate subjective improvement in pt's condition.(approp and ongoing) MET 2/24/22  5. Pt will demonstrate independence with the HEP at discharge  (approp and ongoing)  6.  Pt will have no pain with daily ADLs and will increase overall strength(patient goal)  (approp and ongoing)       Plan   Continue with established Plan of Care towards established PT goals.

## 2022-03-29 ENCOUNTER — CLINICAL SUPPORT (OUTPATIENT)
Dept: REHABILITATION | Facility: HOSPITAL | Age: 72
End: 2022-03-29
Payer: MEDICARE

## 2022-03-29 DIAGNOSIS — M25.69 DECREASED ROM OF TRUNK AND BACK: Primary | ICD-10-CM

## 2022-03-29 DIAGNOSIS — R29.898 DECREASED STRENGTH OF TRUNK AND BACK: ICD-10-CM

## 2022-03-29 DIAGNOSIS — M54.50 CHRONIC MIDLINE LOW BACK PAIN WITHOUT SCIATICA: ICD-10-CM

## 2022-03-29 DIAGNOSIS — G89.29 CHRONIC MIDLINE LOW BACK PAIN WITHOUT SCIATICA: ICD-10-CM

## 2022-03-29 PROCEDURE — 97110 THERAPEUTIC EXERCISES: CPT | Mod: PO | Performed by: PHYSICAL THERAPIST

## 2022-03-29 NOTE — PROGRESS NOTES
[facility]      Name: Julee Bee  Clinic Number: [id number]    Therapy Diagnosis:   Encounter Diagnoses   Name Primary?    Decreased ROM of trunk and back Yes    Chronic midline low back pain without sciatica     Decreased strength of trunk and back      Physician: Jesse Mejía MD    Visit Date: 3/29/2022    Physician: Jesse Mejía MD     Physician Orders: PT Eval and Treat   Medical Diagnosis from Referral: Low back pain  Evaluation Date: 2022  Authorization Period Expiration: 22  Plan of Care Expiration: 22  Reassessment: 22  Reassessment Due:  visit  Visit # / Visits authorized:  (transfer to [facility] program 21)  [facility] visit      Time In: 12:05pm  Time Out: 12:55pm  Total Billable Time: 50 minutes     Precautions: Standard and Diabetes     Pattern of pain determined: pattern 1 PEP      Subjective   Julee reports minimal discomfort in back or hips today.  Patient reports tolerating previous visit well with decreased  pain.   Patient reports their pain to be 2/10 on a 0-10 scale with 0 being no pain and 10 being the worst pain imaginable.  Pain Location: L LB      Occupation: retired  Leisure: sewing      Pts goals: no pain    Objective     Baseline Isometric Testing on Med X equipment: Testing administered by PT  Date of testin22  ROM 0-39 deg 3-36 deg   Max Peak Torque 113 93    Min Peak Torque 57 42    Flex/Ext Ratio 2:1 2.2:1   % below normative data 4 32          Outcomes:  Initial visit: 58%  Visit 5: 46%  Visit 10: 50%    Treatment    Pt was instructed in and performed the following:     Julee received therapeutic exercises to develop/improved posture, cardiovascular endurance, muscular endurance, lumbar/cervical ROM, strength and muscular endurance for 50 minutes including the following exercises:     MedX dynamic  "exercise and isometric testing  Prone on elbows 2 min  Press ups   2/10 (decrease range 2/2 pain)  Bridging 2/10   Standing extension x10  LTR 20x  Piriformis 30" 2x    [facility] 3/29/2022   Visit Number 18   VAS Pain Rating 2   Recumbent Bike Seat Pos. 5   Time 10   Extension in Lying 20   Extension in Standing 10   Manual Therapy -   Lumbar Extension Seat Pad -   Femur Restraint -   Top Dead Center -   Counterweight -   Lumbar Flexion -   Lumbar Extension -   Lumbar Peak Torque -   Min Torque -   Test Percent Below Normative Data -   Test Percent Gain in Strength from Initial  -   Lumbar Weight 56   Repetitions 20   Rating of Perceived Exertion 3   Ice - Z Lie (in min.) -         Peripheral muscle strengthening which included 1 set of 15-20 repetitions at a slow, controlled 10-13 second per rep pace focused on strengthening supporting musculature for improved body mechanics and functional mobility.  Pt and therapist focused on proper form during treatment to ensure optimal strengthening of each targeted muscle group.  Machines were utilized including torso rotation, leg extension, leg curl, chest press, upright row. Tricep extension, bicep curl, leg press, and hip abduction added visit 3    Julee received the following manual therapy techniques: STM to L IT band area for 5 mins w/ pt supine in increase blood flow and soften muscle.         Home Exercises Provided and Patient Education Provided   Home exercises include:  Prone lying 2 min  Press ups on elbows  2/10  Bridging 2/10  Standing extension x10    Cardio program:2/7/22, reviewed again 3/3/22  Lifting education date: 3/3/22  Lumbar roll: has not purchased    Education provided:   - Educated pt on the importance of daily stretch to increase the benefit of program and positive results.     Written Home Exercises Provided: yes.  Exercises were reviewed and Julee was able to demonstrate them prior to the end of the session.  Julee demonstrated good  " understanding of the education provided.     See EMR under Patient Instructions for exercises provided prior visit.      Assessment:    Pt tolerating exercise much better. She is pleased with her weight loss. Overall endurance with exercise much better. She is challenged by most exercises at this point, but remains with appropriate perceived exertion levels.     Reassessment: 2/24/22:  Patient retested at visit 10 and shows improvement in: pain, ROM, strength and function  Improved posture, currently not using lumbar roll, but plans to purchase  Improved lumbar ROM, 3-36 deg initially on med ex test and 0-39 deg  currently  Improved strength at each test point on lumbar med ex IM test with 54%   average improvement noted with reduced pain  noted by patient  Initial outcome tool score of 58% and current outcome tool score of 50% indicating reduced pain and improved function          Patient is making good progress towards established goals.    Pt will continue to benefit from skilled outpatient physical therapy to address the deficits stated in the impairment chart, provide pt/family education and to maximize pt's level of independence in the home and community environment.     Anticipated Barriers for therapy: none  Pt's spiritual, cultural and educational needs considered and pt agreeable to plan of care and goals as stated below:       GOALS: Pt is in agreement with the following goals.     Short term goals:  6 weeks or 10 visits   1.  Pt will demonstrate increased lumbar ROM by at least 3 degrees from the initial ROM value with improvements noted in functional ROM and ability to perform ADLs.  (approp and ongoing) MET 2/24/22  2.  Pt will demonstrate increased MedX average isometric strength value  by 20% from initial test resulting in improved ability to perform bending, lifting, and carrying activities safely, confidently.  (approp and ongoing) MET 2/24/22   3.  Patient report a reduction in worst pain score by  1-2 points for improved tolerance for bending and lifting.  (approp and ongoing) MET 2/24/22  4.  Pt able to perform HEP correctly with minimal cueing or supervision from therapist to encourage independent management of symptoms. (approp and ongoing) MET 2/24/22        Long term goals: 10 weeks or 20 visits   1. Pt will demonstrate increased lumbar ROM by at least 6 degrees from initial ROM value, resulting in improved ability to perform functional fwd bending while standing and sitting. (approp and ongoing) MET 2/24/22  2. Pt will demonstrate increased MedX average isometric strength value  by 30% from initial test resulting in improved ability to perform bending, lifting, and carrying activities safely, confidently.  (approp and ongoing) PROGRESSING  3. Pt to demonstrate ability to independently control and reduce their pain through posture positioning and mechanical movements throughout a typical day.  (approp and ongoing) PROGRESSING  4.  Pt will demonstrate reduced pain and improved functional outcomes as rep                                                                                                                                                                             orted on the FOTO by reaching a limitation score of < or = 50% or less in order to demonstrate subjective improvement in pt's condition.(approp and ongoing) MET 2/24/22  5. Pt will demonstrate independence with the HEP at discharge  (approp and ongoing)  6.  Pt will have no pain with daily ADLs and will increase overall strength(patient goal)  (approp and ongoing)       Plan   Continue with established Plan of Care towards established PT goals.

## 2022-03-31 ENCOUNTER — CLINICAL SUPPORT (OUTPATIENT)
Dept: REHABILITATION | Facility: HOSPITAL | Age: 72
End: 2022-03-31
Payer: MEDICARE

## 2022-03-31 DIAGNOSIS — G89.29 CHRONIC MIDLINE LOW BACK PAIN WITHOUT SCIATICA: ICD-10-CM

## 2022-03-31 DIAGNOSIS — R29.898 DECREASED STRENGTH OF TRUNK AND BACK: ICD-10-CM

## 2022-03-31 DIAGNOSIS — M54.50 CHRONIC MIDLINE LOW BACK PAIN WITHOUT SCIATICA: ICD-10-CM

## 2022-03-31 DIAGNOSIS — M25.69 DECREASED ROM OF TRUNK AND BACK: Primary | ICD-10-CM

## 2022-03-31 PROCEDURE — 97110 THERAPEUTIC EXERCISES: CPT | Mod: PO | Performed by: PHYSICAL THERAPIST

## 2022-03-31 NOTE — PROGRESS NOTES
[facility]      Name: Julee Bee  Clinic Number: [id number]    Therapy Diagnosis:   Encounter Diagnoses   Name Primary?    Decreased ROM of trunk and back Yes    Chronic midline low back pain without sciatica     Decreased strength of trunk and back      Physician: Jesse Mejía MD    Visit Date: 3/31/2022    Physician: Jesse Mejía MD     Physician Orders: PT Eval and Treat   Medical Diagnosis from Referral: Low back pain  Evaluation Date: 2022  Authorization Period Expiration: 22  Plan of Care Expiration: 22  Reassessment: 22  Reassessment Due:  visit  Visit # / Visits authorized:  (transfer to [facility] program 21)  [facility] visit      Time In: 9:55AM  Time Out: 10:45AM  Total Billable Time: 50 minutes     Precautions: Standard and Diabetes     Pattern of pain determined: pattern 1 PEP      Subjective   Julee reports she has been feeling good with minimal discomfort.  Patient reports tolerating previous visit well with decreased  pain.   Patient reports their pain to be 1/10 on a 0-10 scale with 0 being no pain and 10 being the worst pain imaginable.  Pain Location: L LB      Occupation: retired  Leisure: sewing      Pts goals: no pain    Objective     Baseline Isometric Testing on Med X equipment: Testing administered by PT  Date of testin22  ROM 0-39 deg 3-36 deg   Max Peak Torque 113 93    Min Peak Torque 57 42    Flex/Ext Ratio 2:1 2.2:1   % below normative data 4 32          Outcomes:  Initial visit: 58%  Visit 5: 46%  Visit 10: 50%    Treatment    Pt was instructed in and performed the following:     Julee received therapeutic exercises to develop/improved posture, cardiovascular endurance, muscular endurance, lumbar/cervical ROM, strength and muscular endurance for 50 minutes including the following exercises:     MedX  "dynamic exercise and isometric testing  Prone on elbows 2 min  Press ups   2/10 (decrease range 2/2 pain)  Bridging 2/10   Standing extension x10  LTR 20x  Piriformis 30" 2x  [facility] 3/31/2022   Visit Number 19   VAS Pain Rating 1   Recumbent Bike Seat Pos. 5   Time 10   Extension in Lying 20   Extension in Standing 10   Manual Therapy -   Lumbar Extension Seat Pad -   Femur Restraint -   Top Dead Center -   Counterweight -   Lumbar Flexion -   Lumbar Extension -   Lumbar Peak Torque -   Min Torque -   Test Percent Below Normative Data -   Test Percent Gain in Strength from Initial  -   Lumbar Weight 58   Repetitions 18   Rating of Perceived Exertion 4   Ice - Z Lie (in min.) -           Peripheral muscle strengthening which included 1 set of 15-20 repetitions at a slow, controlled 10-13 second per rep pace focused on strengthening supporting musculature for improved body mechanics and functional mobility.  Pt and therapist focused on proper form during treatment to ensure optimal strengthening of each targeted muscle group.  Machines were utilized including torso rotation, leg extension, leg curl, chest press, upright row. Tricep extension, bicep curl, leg press, and hip abduction added visit 3    Julee received the following manual therapy techniques: STM to L IT band area for 5 mins w/ pt supine in increase blood flow and soften muscle.         Home Exercises Provided and Patient Education Provided   Home exercises include:  Prone lying 2 min  Press ups on elbows  2/10  Bridging 2/10  Standing extension x10    Cardio program:2/7/22, reviewed again 3/3/22  Lifting education date: 3/3/22  Lumbar roll: has not purchased    Education provided:   - Educated pt on the importance of daily stretch to increase the benefit of program and positive results.     Written Home Exercises Provided: yes.  Exercises were reviewed and Julee was able to demonstrate them prior to the end of the session.  Julee demonstrated " good  understanding of the education provided.     See EMR under Patient Instructions for exercises provided prior visit.      Assessment:    Overall improved ROM of back with decreased pain. Progressing with reps or resistance on core and peripheral machines with appropriate perceived exertion. Will retest next visit and transition to wellness.     Reassessment: 2/24/22:  Patient retested at visit 10 and shows improvement in: pain, ROM, strength and function  Improved posture, currently not using lumbar roll, but plans to purchase  Improved lumbar ROM, 3-36 deg initially on med ex test and 0-39 deg  currently  Improved strength at each test point on lumbar med ex IM test with 54%   average improvement noted with reduced pain  noted by patient  Initial outcome tool score of 58% and current outcome tool score of 50% indicating reduced pain and improved function          Patient is making good progress towards established goals.    Pt will continue to benefit from skilled outpatient physical therapy to address the deficits stated in the impairment chart, provide pt/family education and to maximize pt's level of independence in the home and community environment.     Anticipated Barriers for therapy: none  Pt's spiritual, cultural and educational needs considered and pt agreeable to plan of care and goals as stated below:       GOALS: Pt is in agreement with the following goals.     Short term goals:  6 weeks or 10 visits   1.  Pt will demonstrate increased lumbar ROM by at least 3 degrees from the initial ROM value with improvements noted in functional ROM and ability to perform ADLs.  (approp and ongoing) MET 2/24/22  2.  Pt will demonstrate increased MedX average isometric strength value  by 20% from initial test resulting in improved ability to perform bending, lifting, and carrying activities safely, confidently.  (approp and ongoing) MET 2/24/22   3.  Patient report a reduction in worst pain score by 1-2 points  for improved tolerance for bending and lifting.  (approp and ongoing) MET 2/24/22  4.  Pt able to perform HEP correctly with minimal cueing or supervision from therapist to encourage independent management of symptoms. (approp and ongoing) MET 2/24/22        Long term goals: 10 weeks or 20 visits   1. Pt will demonstrate increased lumbar ROM by at least 6 degrees from initial ROM value, resulting in improved ability to perform functional fwd bending while standing and sitting. (approp and ongoing) MET 2/24/22  2. Pt will demonstrate increased MedX average isometric strength value  by 30% from initial test resulting in improved ability to perform bending, lifting, and carrying activities safely, confidently.  (approp and ongoing) PROGRESSING  3. Pt to demonstrate ability to independently control and reduce their pain through posture positioning and mechanical movements throughout a typical day.  (approp and ongoing) PROGRESSING  4.  Pt will demonstrate reduced pain and improved functional outcomes as rep                                                                                                                                                                             orted on the FOTO by reaching a limitation score of < or = 50% or less in order to demonstrate subjective improvement in pt's condition.(approp and ongoing) MET 2/24/22  5. Pt will demonstrate independence with the HEP at discharge  (approp and ongoing)  6.  Pt will have no pain with daily ADLs and will increase overall strength(patient goal)  (approp and ongoing)       Plan   Continue with established Plan of Care towards established PT goals.

## 2022-04-05 ENCOUNTER — CLINICAL SUPPORT (OUTPATIENT)
Dept: REHABILITATION | Facility: HOSPITAL | Age: 72
End: 2022-04-05
Payer: MEDICARE

## 2022-04-05 DIAGNOSIS — R29.898 DECREASED STRENGTH OF TRUNK AND BACK: ICD-10-CM

## 2022-04-05 DIAGNOSIS — M25.69 DECREASED ROM OF TRUNK AND BACK: Primary | ICD-10-CM

## 2022-04-05 DIAGNOSIS — M54.50 CHRONIC MIDLINE LOW BACK PAIN WITHOUT SCIATICA: ICD-10-CM

## 2022-04-05 DIAGNOSIS — G89.29 CHRONIC MIDLINE LOW BACK PAIN WITHOUT SCIATICA: ICD-10-CM

## 2022-04-05 PROCEDURE — 97110 THERAPEUTIC EXERCISES: CPT | Mod: PO | Performed by: PHYSICAL THERAPIST

## 2022-04-05 PROCEDURE — 97750 PHYSICAL PERFORMANCE TEST: CPT | Mod: PO | Performed by: PHYSICAL THERAPIST

## 2022-04-05 NOTE — PROGRESS NOTES
[facility]      Name: Julee Bee  Clinic Number: [id number]    Therapy Diagnosis:   Encounter Diagnoses   Name Primary?    Decreased ROM of trunk and back Yes    Chronic midline low back pain without sciatica     Decreased strength of trunk and back      Physician: Jesse Mejía MD    Visit Date: 2022    Physician: Jesse Mejía MD     Physician Orders: PT Eval and Treat   Medical Diagnosis from Referral: Low back pain  Evaluation Date: 2022  Authorization Period Expiration: 22  Plan of Care Expiration: 22  Reassessment: 22, 22  Visit # / Visits authorized:  (transfer to Healthy [facility] program 21)  Healthy Back visit      Time In: 10:45AM  Time Out: 11:35AM  Total Billable Time: 50 minutes     Precautions: Standard and Diabetes     Pattern of pain determined: pattern 1 PEP      Subjective   Julee reports she is without pain today. She has no back or hip pain. She cancelled appt with pain management because she has been feeling good. She feels she has made good progress with Healthy [facility] program.  Patient reports tolerating previous visit well without pain.   Patient reports their pain to be 0/10 on a 0-10 scale with 0 being no pain and 10 being the worst pain imaginable.  Pain Location: L LB      Occupation: retired  Leisure: sewing      Pts goals: no pain    Objective     Baseline Isometric Testing on Med X equipment: Testing administered by PT  Date of testin22  ROM 0-45 deg 0-39 deg 3-36 deg   Max Peak Torque 142 113 93    Min Peak Torque 47 57 42    Flex/Ext Ratio 3:1 2:1 2.2:1   % below normative data 4 4 32          Outcomes:  Initial visit: 58%  Visit 5: 46%  Visit 10: 50%  Visit 20: 46%    Treatment    Pt was instructed in and performed the following:     Julee received therapeutic exercises to develop/improved  "posture, cardiovascular endurance, muscular endurance, lumbar/cervical ROM, strength and muscular endurance for 50 minutes including the following exercises:     MedX dynamic exercise and isometric testing  Prone on elbows 2 min  Press ups   2/10 (decrease range 2/2 pain)  Bridging 2/10   Standing extension x10  LTR 20x  Piriformis 30" 2x    [facility] Therapy 4/5/2022   Visit Number 20   VAS Pain Rating 0   Recumbent Bike Seat Pos. 5   Time 10   Extension in Lying 20   Extension in Standing 10   Manual Therapy -   Lumbar Extension Seat Pad -   Femur Restraint -   Top Dead Center -   Counterweight -   Lumbar Flexion 45   Lumbar Extension 0   Lumbar Peak Torque 142   Min Torque 47   Test Percent Below Normative Data 4   Test Percent Gain in Strength from Initial  56   Lumbar Weight -   Repetitions -   Rating of Perceived Exertion -   Ice - Z Lie (in min.) -             Peripheral muscle strengthening which included 1 set of 15-20 repetitions at a slow, controlled 10-13 second per rep pace focused on strengthening supporting musculature for improved body mechanics and functional mobility.  Pt and therapist focused on proper form during treatment to ensure optimal strengthening of each targeted muscle group.  Machines were utilized including torso rotation, leg extension, leg curl, chest press, upright row. Tricep extension, bicep curl, leg press, and hip abduction added visit 3    Julee received the following manual therapy techniques: STM to L IT band area for 00 mins w/ pt supine in increase blood flow and soften muscle.         Home Exercises Provided and Patient Education Provided   Home exercises include:  Prone lying 2 min  Press ups on elbows  2/10  Bridging 2/10  Standing extension x10  LTR  Piriformis stretch    Cardio program:2/7/22, reviewed again 3/3/22  Lifting education date: 3/3/22  Lumbar roll: has not purchased    Education provided:   - Educated pt on the importance of daily stretch to increase the " benefit of program and positive results.     Written Home Exercises Provided: yes.  Exercises were reviewed and Julee was able to demonstrate them prior to the end of the session.  Julee demonstrated good  understanding of the education provided.     See EMR under Patient Instructions for exercises provided prior visit.      Assessment:    Reassessment 4/5/22:  Patient retested at visit 20 and shows improvement in: ROM, strength, pain and function  Improved posture, currently not using lumbar roll. Recommended use.  Improved lumbar ROM, 3-36 degrees  initially on med ex test and 0-45 degrees  currently  Improved strength at each test point on lumbar med ex IM test with 56%  average improvement noted with reduced pain noted by patient  Initial outcome tool score of 58% and current outcome tool score of 46%  indicating reduced pain and improved function        Reassessment: 2/24/22:  Patient retested at visit 10 and shows improvement in: pain, ROM, strength and function  Improved posture, currently not using lumbar roll, but plans to purchase  Improved lumbar ROM, 3-36 deg initially on med ex test and 0-39 deg  currently  Improved strength at each test point on lumbar med ex IM test with 54%   average improvement noted with reduced pain  noted by patient  Initial outcome tool score of 58% and current outcome tool score of 50% indicating reduced pain and improved function          Patient is making good progress towards established goals.      Anticipated Barriers for therapy: none  Pt's spiritual, cultural and educational needs considered and pt agreeable to plan of care and goals as stated below:       GOALS: Pt is in agreement with the following goals.     Short term goals:  6 weeks or 10 visits   1.  Pt will demonstrate increased lumbar ROM by at least 3 degrees from the initial ROM value with improvements noted in functional ROM and ability to perform ADLs.  (approp and ongoing) MET 2/24/22  2.  Pt will  demonstrate increased MedX average isometric strength value  by 20% from initial test resulting in improved ability to perform bending, lifting, and carrying activities safely, confidently.  (approp and ongoing) MET 2/24/22   3.  Patient report a reduction in worst pain score by 1-2 points for improved tolerance for bending and lifting.  (approp and ongoing) MET 2/24/22  4.  Pt able to perform HEP correctly with minimal cueing or supervision from therapist to encourage independent management of symptoms. (approp and ongoing) MET 2/24/22        Long term goals: 10 weeks or 20 visits   1. Pt will demonstrate increased lumbar ROM by at least 6 degrees from initial ROM value, resulting in improved ability to perform functional fwd bending while standing and sitting. (approp and ongoing) MET 2/24/22  2. Pt will demonstrate increased MedX average isometric strength value  by 30% from initial test resulting in improved ability to perform bending, lifting, and carrying activities safely, confidently.  (approp and ongoing) MET 4/5/22  3. Pt to demonstrate ability to independently control and reduce their pain through posture positioning and mechanical movements throughout a typical day.  (approp and ongoing) MET 4/5/22  4.  Pt will demonstrate reduced pain and improved functional outcomes as reported on the FOTO by reaching a limitation score of < or = 50% or less in order to demonstrate subjective improvement in pt's condition.(approp and ongoing) MET 2/24/22  5. Pt will demonstrate independence with the HEP at discharge  (approp and ongoing) MET 4/5/22  6.  Pt will have no pain with daily ADLs and will increase overall strength(patient goal)  (approp and ongoing) MET 4/5/22       Plan     Patient has attended 20 visits of the [facility] program for aerobic work, med ex isometric testing with dynamic strengthening on med ex equipment for spine, whole body strengthening on med ex equipment, HEP, education.  Patient has  completed Healthy Back Program and is ready to be transitioned into wellness program.  Importance of wellness program, and attending 1/week to maintain strength stressed.  Importance of continuing there ex and body mech and ergonomics stressed.   Patient demonstrates improvement in ability to reduce symptoms, improved posture, improved ROM and improved strength.   Reviewed HEP, and importance of maintaining a healthy spine through continued stretching and performance of HEP, good posture, good ergonomics, good body mech with ADL, leisure, and work.  Discharge handout with HEP given, and discussed aspects of exercise program, cardio, strengthening, and stretching.    Patient expressed understanding information given.  Patient plans to attend wellness and is ready to transition to wellness.        [facility]   Discharge Note    Name: Julee Bee  Clinic Number: [id number]    Therapy Diagnosis:   Encounter Diagnoses   Name Primary?    Decreased ROM of trunk and back Yes    Chronic midline low back pain without sciatica     Decreased strength of trunk and back      Physician: Jesse Mejía MD    Physician Orders: PT Eval and Treat   Medical Diagnosis from Referral: Low back pain  Evaluation Date: 1/12/2022        Date of Last visit: 4/5/22  Total Visits Received: 20    ASSESSMENT      See above    Discharge reason: Other:  Pt has completed the Healthy Back program and is ready to transition to wellness.    Discharge FOTO Score: 46%    Goals: met as above    PLAN   This patient is discharged from Physical Therapy      Kelsi Ulloa PT

## 2022-04-12 ENCOUNTER — DOCUMENTATION ONLY (OUTPATIENT)
Dept: REHABILITATION | Facility: HOSPITAL | Age: 72
End: 2022-04-12
Payer: MEDICARE

## 2022-04-12 NOTE — PROGRESS NOTES
"Health  Wellness Visit Note    Name: Julee Bee  Clinic Number: [id number]  Physician: Jesse Mejía MD  Diagnosis: No diagnosis found.  Past Medical History:   Diagnosis Date    Back pain     Colon polyp     GERD (gastroesophageal reflux disease)     H/O bronchitis     H/O: pneumonia     Hearing aid worn     Hyperlipidemia     Hypertension     IBS (irritable bowel syndrome)      Visit Number: 21  Precautions: Standard and Diabetes      1st PT visit: 1/12/2022  Year of care end date: 1/12/2023  6 Month test performed: July 2022  12 Month test performed: January 2023  Mind body plan: 2F  Patient level: B    Time In: 10:55 AM  Time Out: 11:55 AM  Total Treatment Time: 60 minutes    Wellness Vision 2022  Handout on this week's wellness topic Breathing Awareness provided  along with a discussion on what it means, the benefits, and suggestions for practice.  Reviewed last week's topic of Postural Awareness.    Subjective:   Patient reports 2 out of 10 pain in lower back. Stretching has been completed throughout the week at a moderate frequency. Icing has been completed when felt necessary for pain. Exercising has been completed at a moderate frequency and consist of mostly walking and activities of daily living.    Patient has painful crepitus sometimes when completing torso rotation with turning to the left against resistance, but is weaker on the other side.    Objective:   Julee completed therapeutic stretches (Prone on elbows 2 min, Press ups   2/10 (decrease range 2/2 pain), Bridging 2/10, Standing extension x10, LTR 20x, Piriformis 30" 2x) and the following MedX exercise machines: core lumbar, torso rotation l/r, leg extension, leg curl, upright row, chest press, biceps curl, triceps extension, leg press.    See exercise log in patient folder for rate of exertion and repetitions completed.       Fitness Machine Education Key:  E=education on equipment initiated and further follow up and " education needed  I=independent with  and exercise.  The patient:   Adjusts machines to his/her settings   Uses equipment levers, pins, weights safely   Maintains safe and correct posture while exercising   Moves through exercise with correct pace and control   Gets on and off equipment safely      Lumbar/Cervical Ext.  Torso Rotation  Leg Press    Leg Extension  Seated Leg Curl  Chest Press    Seated Row  Hip ADD  Hip ABD    Triceps Extension  Bicep Curl  Other:        Assessment:   Patient tolerated MedX Core Lumbar Machine and all other peripheral machines. Patient warmed up on recumbent bike, stretched, and skipped ice after workout.    Plan:  Continue with established plan of care towards wellness goals.     Health  : Remi Hudson  4/12/2022

## 2022-04-18 ENCOUNTER — IMMUNIZATION (OUTPATIENT)
Dept: FAMILY MEDICINE | Facility: CLINIC | Age: 72
End: 2022-04-18
Payer: MEDICARE

## 2022-04-18 DIAGNOSIS — Z23 NEED FOR VACCINATION: Primary | ICD-10-CM

## 2022-04-18 PROCEDURE — 91305 COVID-19, MRNA, LNP-S, PF, 30 MCG/0.3 ML DOSE VACCINE (PFIZER): CPT | Mod: PBBFAC | Performed by: FAMILY MEDICINE

## 2022-04-19 ENCOUNTER — DOCUMENTATION ONLY (OUTPATIENT)
Dept: REHABILITATION | Facility: HOSPITAL | Age: 72
End: 2022-04-19
Payer: MEDICARE

## 2022-04-19 NOTE — PROGRESS NOTES
"Health  Wellness Visit Note    Name: Julee Bee  Clinic Number: [id number]  Physician: Jesse Mejía MD  Diagnosis: No diagnosis found.  Past Medical History:   Diagnosis Date    Back pain     Colon polyp     GERD (gastroesophageal reflux disease)     H/O bronchitis     H/O: pneumonia     Hearing aid worn     Hyperlipidemia     Hypertension     IBS (irritable bowel syndrome)      Visit Number: 22  Precautions: Standard and Diabetes      1st PT visit: 1/12/2022  Year of care end date: 1/12/2023  6 Month test performed: July 2022  12 Month test performed: January 2023  Mind body plan: 2F  Patient level: B    Time In: 10:55 AM  Time Out: 11:55 AM  Total Treatment Time: 60 minutes    Wellness Vision 2022  Handout on this week's wellness topic Air Quality & Oxygen Utilization provided  along with a discussion on what it means, the benefits, and suggestions for practice.  Reviewed last week's topic of N/A.    Subjective:   Patient reports 2 out of 10 pain in lower back and feels better with movement in the morning. Stretching has been completed throughout the week at a moderate frequency. Icing has been completed when felt necessary for pain. Exercising has been completed at a moderate frequency and consist of mostly walking and activities of daily living.    Patient has painful crepitus sometimes when completing torso rotation with turning to the left against resistance, but is weaker on the other side.    Objective:   Juele completed therapeutic stretches (Prone on elbows 2 min, Press ups   2/10 (decrease range 2/2 pain), Bridging 2/10, Standing extension x10, LTR 20x, Piriformis 30" 2x) and the following MedX exercise machines: core lumbar, torso rotation l/r, leg extension, leg curl, upright row, chest press, biceps curl, triceps extension, leg press.    See exercise log in patient folder for rate of exertion and repetitions completed.       Fitness Machine Education Key:  E=education on " equipment initiated and further follow up and education needed  I=independent with  and exercise.  The patient:   Adjusts machines to his/her settings   Uses equipment levers, pins, weights safely   Maintains safe and correct posture while exercising   Moves through exercise with correct pace and control   Gets on and off equipment safely      Lumbar/Cervical Ext.  Torso Rotation  Leg Press    Leg Extension  Seated Leg Curl  Chest Press    Seated Row  Hip ADD  Hip ABD    Triceps Extension  Bicep Curl  Other:        Assessment:   Patient tolerated MedX Core Lumbar Machine and all other peripheral machines. Patient warmed up on recumbent bike, stretched, and skipped ice after workout.    Plan:  Continue with established plan of care towards wellness goals.     Health  : Remi Hudson  4/19/2022

## 2022-04-27 ENCOUNTER — PATIENT MESSAGE (OUTPATIENT)
Dept: REHABILITATION | Facility: HOSPITAL | Age: 72
End: 2022-04-27
Payer: MEDICARE

## 2022-04-29 ENCOUNTER — DOCUMENTATION ONLY (OUTPATIENT)
Dept: REHABILITATION | Facility: HOSPITAL | Age: 72
End: 2022-04-29
Payer: MEDICARE

## 2022-04-29 NOTE — PROGRESS NOTES
"Health  Wellness Visit Note    Name: Julee Bee  Clinic Number: [id number]  Physician: Jesse Mejía MD  Diagnosis: No diagnosis found.  Past Medical History:   Diagnosis Date    Back pain     Colon polyp     GERD (gastroesophageal reflux disease)     H/O bronchitis     H/O: pneumonia     Hearing aid worn     Hyperlipidemia     Hypertension     IBS (irritable bowel syndrome)      Visit Number: 23  Precautions: Standard and Diabetes      1st PT visit: 1/12/2022  Year of care end date: 1/12/2023  6 Month test performed: July 2022  12 Month test performed: January 2023  Mind body plan: 2F  Patient level: B    Time In: 11:00 AM  Time Out: 12:00 PM  Total Treatment Time: 60 minutes    Wellness Vision 2022  Handout on this week's wellness topic Breathing Exercises provided  along with a discussion on what it means, the benefits, and suggestions for practice.  Reviewed last week's topic of Air Quality & Oxygen Utilization.    Subjective:   Patient reports 2 out of 10 pain in lower back and feels better with movement in the morning. Stretching has been completed throughout the week at a moderate frequency. Icing has been completed when felt necessary for pain. Exercising has been completed at a moderate frequency and consist of mostly walking and activities of daily living.    Patient has painful crepitus sometimes when completing torso rotation with turning to the left against resistance, but is weaker on the other side.    Objective:   Julee completed therapeutic stretches (Prone on elbows 2 min, Press ups   2/10 (decrease range 2/2 pain), Bridging 2/10, Standing extension x10, LTR 20x, Piriformis 30" 2x) and the following MedX exercise machines: core lumbar, torso rotation l/r, leg extension, leg curl, upright row, chest press, biceps curl, triceps extension, leg press.    See exercise log in patient folder for rate of exertion and repetitions completed.       Fitness Machine Education " Key:  E=education on equipment initiated and further follow up and education needed  I=independent with  and exercise.  The patient:   Adjusts machines to his/her settings   Uses equipment levers, pins, weights safely   Maintains safe and correct posture while exercising   Moves through exercise with correct pace and control   Gets on and off equipment safely      Lumbar/Cervical Ext.  Torso Rotation  Leg Press    Leg Extension  Seated Leg Curl  Chest Press    Seated Row  Hip ADD  Hip ABD    Triceps Extension  Bicep Curl  Other:        Assessment:   Patient tolerated MedX Core Lumbar Machine and all other peripheral machines. Patient warmed up on recumbent bike, stretched, and skipped ice after workout.    Plan:  Continue with established plan of care towards wellness goals.     Health  : Remi Hudson  4/29/2022

## 2022-05-03 ENCOUNTER — DOCUMENTATION ONLY (OUTPATIENT)
Dept: REHABILITATION | Facility: HOSPITAL | Age: 72
End: 2022-05-03
Payer: MEDICARE

## 2022-05-03 NOTE — PROGRESS NOTES
"Health  Wellness Visit Note    Name: Julee Bee  Clinic Number: [id number]  Physician: Jesse Mejía MD  Diagnosis: No diagnosis found.  Past Medical History:   Diagnosis Date    Back pain     Colon polyp     GERD (gastroesophageal reflux disease)     H/O bronchitis     H/O: pneumonia     Hearing aid worn     Hyperlipidemia     Hypertension     IBS (irritable bowel syndrome)      Visit Number: 23   Precautions: Standard and Diabetes    1st PT visit: 1/12/2022  Year of care end date: 1/12/2023  6 Month test performed: July 2022  12 Month test performed: January 2023  Mind body plan: 2F  Patient level: B    Time In: 10:00 AM  Time Out: 11:00 PM  Total Treatment Time: 60 minutes    Wellness Vision 2022  Handout on this week's wellness topic Creative Brain provided  along with a discussion on what it means, the benefits, and suggestions for practice.  Reviewed last week's topic of Breathing Exercises.    Subjective:   Patient reports having more pain in lower back than usual mostly because of moving heavy items in a flexed position for a long period of time. Stretching has been completed throughout the week at a moderate frequency. Icing has been completed when felt necessary for pain. Exercising has been completed at a moderate frequency and consist of mostly walking and activities of daily living.    Patient has painful crepitus sometimes when completing torso rotation with turning to the left against resistance, but is weaker on the other side.    Objective:   Julee completed therapeutic stretches (Prone on elbows 2 min, Press ups   2/10 (decrease range 2/2 pain), Bridging 2/10, Standing extension x10, LTR 20x, Piriformis 30" 2x) and the following DCITS exercise machines: core lumbar, torso rotation l/r, leg extension, leg curl, upright row, chest press, biceps curl, triceps extension, leg press.    See exercise log in patient folder for rate of exertion and repetitions completed. "       Fitness Machine Education Key:  E=education on equipment initiated and further follow up and education needed  I=independent with  and exercise.  The patient:   Adjusts machines to his/her settings   Uses equipment levers, pins, weights safely   Maintains safe and correct posture while exercising   Moves through exercise with correct pace and control   Gets on and off equipment safely      Lumbar/Cervical Ext.  Torso Rotation  Leg Press    Leg Extension  Seated Leg Curl  Chest Press    Seated Row  Hip ADD  Hip ABD    Triceps Extension  Bicep Curl  Other:        Assessment:   Patient tolerated MedX Core Lumbar Machine and all other peripheral machines. Patient warmed up on recumbent bike, stretched, and skipped ice after workout.    Plan:  Continue with established plan of care towards wellness goals.     Health  : Remi Hudson  5/3/2022

## 2022-05-05 ENCOUNTER — PATIENT MESSAGE (OUTPATIENT)
Dept: REHABILITATION | Facility: HOSPITAL | Age: 72
End: 2022-05-05
Payer: MEDICARE

## 2022-05-19 ENCOUNTER — TELEPHONE (OUTPATIENT)
Dept: REHABILITATION | Facility: HOSPITAL | Age: 72
End: 2022-05-19
Payer: MEDICARE

## 2022-06-02 ENCOUNTER — TELEPHONE (OUTPATIENT)
Dept: REHABILITATION | Facility: HOSPITAL | Age: 72
End: 2022-06-02
Payer: MEDICARE

## 2022-07-08 ENCOUNTER — TELEPHONE (OUTPATIENT)
Dept: REHABILITATION | Facility: HOSPITAL | Age: 72
End: 2022-07-08
Payer: MEDICARE

## 2022-09-09 ENCOUNTER — PATIENT MESSAGE (OUTPATIENT)
Dept: PODIATRY | Facility: CLINIC | Age: 72
End: 2022-09-09
Payer: MEDICARE

## 2022-10-14 ENCOUNTER — IMMUNIZATION (OUTPATIENT)
Dept: FAMILY MEDICINE | Facility: CLINIC | Age: 72
End: 2022-10-14
Payer: MEDICARE

## 2022-10-14 DIAGNOSIS — Z23 NEED FOR VACCINATION: Primary | ICD-10-CM

## 2022-10-14 PROCEDURE — 91312 COVID-19, MRNA, LNP-S, BIVALENT BOOSTER, PF, 30 MCG/0.3 ML DOSE: CPT | Mod: S$GLB,,, | Performed by: FAMILY MEDICINE

## 2022-10-14 PROCEDURE — 0124A COVID-19, MRNA, LNP-S, BIVALENT BOOSTER, PF, 30 MCG/0.3 ML DOSE: CPT | Mod: PBBFAC | Performed by: FAMILY MEDICINE

## 2022-10-14 PROCEDURE — 91312 COVID-19, MRNA, LNP-S, BIVALENT BOOSTER, PF, 30 MCG/0.3 ML DOSE: ICD-10-PCS | Mod: S$GLB,,, | Performed by: FAMILY MEDICINE

## 2022-11-02 ENCOUNTER — OFFICE VISIT (OUTPATIENT)
Dept: PODIATRY | Facility: CLINIC | Age: 72
End: 2022-11-02
Payer: MEDICARE

## 2022-11-02 DIAGNOSIS — E11.51 TYPE II DIABETES MELLITUS WITH PERIPHERAL CIRCULATORY DISORDER: Primary | ICD-10-CM

## 2022-11-02 PROCEDURE — 3066F PR DOCUMENTATION OF TREATMENT FOR NEPHROPATHY: ICD-10-PCS | Mod: CPTII,S$GLB,, | Performed by: PODIATRIST

## 2022-11-02 PROCEDURE — 1159F PR MEDICATION LIST DOCUMENTED IN MEDICAL RECORD: ICD-10-PCS | Mod: CPTII,S$GLB,, | Performed by: PODIATRIST

## 2022-11-02 PROCEDURE — 1101F PT FALLS ASSESS-DOCD LE1/YR: CPT | Mod: CPTII,S$GLB,, | Performed by: PODIATRIST

## 2022-11-02 PROCEDURE — 99999 PR PBB SHADOW E&M-EST. PATIENT-LVL III: ICD-10-PCS | Mod: PBBFAC,,, | Performed by: PODIATRIST

## 2022-11-02 PROCEDURE — 1157F PR ADVANCE CARE PLAN OR EQUIV PRESENT IN MEDICAL RECORD: ICD-10-PCS | Mod: CPTII,S$GLB,, | Performed by: PODIATRIST

## 2022-11-02 PROCEDURE — 3288F FALL RISK ASSESSMENT DOCD: CPT | Mod: CPTII,S$GLB,, | Performed by: PODIATRIST

## 2022-11-02 PROCEDURE — 99213 OFFICE O/P EST LOW 20 MIN: CPT | Mod: S$GLB,,, | Performed by: PODIATRIST

## 2022-11-02 PROCEDURE — 3044F HG A1C LEVEL LT 7.0%: CPT | Mod: CPTII,S$GLB,, | Performed by: PODIATRIST

## 2022-11-02 PROCEDURE — 1126F AMNT PAIN NOTED NONE PRSNT: CPT | Mod: CPTII,S$GLB,, | Performed by: PODIATRIST

## 2022-11-02 PROCEDURE — 1126F PR PAIN SEVERITY QUANTIFIED, NO PAIN PRESENT: ICD-10-PCS | Mod: CPTII,S$GLB,, | Performed by: PODIATRIST

## 2022-11-02 PROCEDURE — 1160F PR REVIEW ALL MEDS BY PRESCRIBER/CLIN PHARMACIST DOCUMENTED: ICD-10-PCS | Mod: CPTII,S$GLB,, | Performed by: PODIATRIST

## 2022-11-02 PROCEDURE — 1157F ADVNC CARE PLAN IN RCRD: CPT | Mod: CPTII,S$GLB,, | Performed by: PODIATRIST

## 2022-11-02 PROCEDURE — 1159F MED LIST DOCD IN RCRD: CPT | Mod: CPTII,S$GLB,, | Performed by: PODIATRIST

## 2022-11-02 PROCEDURE — 99213 PR OFFICE/OUTPT VISIT, EST, LEVL III, 20-29 MIN: ICD-10-PCS | Mod: S$GLB,,, | Performed by: PODIATRIST

## 2022-11-02 PROCEDURE — 3044F PR MOST RECENT HEMOGLOBIN A1C LEVEL <7.0%: ICD-10-PCS | Mod: CPTII,S$GLB,, | Performed by: PODIATRIST

## 2022-11-02 PROCEDURE — 1101F PR PT FALLS ASSESS DOC 0-1 FALLS W/OUT INJ PAST YR: ICD-10-PCS | Mod: CPTII,S$GLB,, | Performed by: PODIATRIST

## 2022-11-02 PROCEDURE — 1160F RVW MEDS BY RX/DR IN RCRD: CPT | Mod: CPTII,S$GLB,, | Performed by: PODIATRIST

## 2022-11-02 PROCEDURE — 3288F PR FALLS RISK ASSESSMENT DOCUMENTED: ICD-10-PCS | Mod: CPTII,S$GLB,, | Performed by: PODIATRIST

## 2022-11-02 PROCEDURE — 4010F PR ACE/ARB THEARPY RXD/TAKEN: ICD-10-PCS | Mod: CPTII,S$GLB,, | Performed by: PODIATRIST

## 2022-11-02 PROCEDURE — 4010F ACE/ARB THERAPY RXD/TAKEN: CPT | Mod: CPTII,S$GLB,, | Performed by: PODIATRIST

## 2022-11-02 PROCEDURE — 3066F NEPHROPATHY DOC TX: CPT | Mod: CPTII,S$GLB,, | Performed by: PODIATRIST

## 2022-11-02 PROCEDURE — 99999 PR PBB SHADOW E&M-EST. PATIENT-LVL III: CPT | Mod: PBBFAC,,, | Performed by: PODIATRIST

## 2022-11-02 PROCEDURE — 3061F PR NEG MICROALBUMINURIA RESULT DOCUMENTED/REVIEW: ICD-10-PCS | Mod: CPTII,S$GLB,, | Performed by: PODIATRIST

## 2022-11-02 PROCEDURE — 3061F NEG MICROALBUMINURIA REV: CPT | Mod: CPTII,S$GLB,, | Performed by: PODIATRIST

## 2022-11-02 NOTE — PROGRESS NOTES
Subjective:      Patient ID: Julee Bee is a 72 y.o. female.    Chief Complaint: Diabetic Foot Exam    Julee is a 72 y.o. female who presents to the clinic upon referral from Dr. Ruma bernal. provider found  for evaluation and treatment of diabetic feet. Julee has a past medical history of Back pain, Colon polyp, GERD (gastroesophageal reflux disease), H/O bronchitis, H/O: pneumonia, Hearing aid worn, Hyperlipidemia, Hypertension, and IBS (irritable bowel syndrome). Here for her annual diabetic foot check up no acute changes since last year doing well overall        PCP: Devin Denton MD    Date Last Seen by PCP: 9/12/22    Current shoe gear: Casual shoes    Hemoglobin A1C   Date Value Ref Range Status   09/09/2022 6.0 (H) 0.0 - 5.6 % Final     Comment:     Reference Interval:  5.0 - 5.6 Normal   5.7 - 6.4 High Risk   > 6.5 Diabetic      Hgb A1c results are standardized based on the (NGSP) National   Glycohemoglobin Standardization Program.      Hemoglobin A1C levels are related to mean serum/plasma glucose   during the preceding 2-3 months.        03/09/2022 6.0 (H) 0.0 - 5.6 % Final     Comment:     Reference Interval:  5.0 - 5.6 Normal   5.7 - 6.4 High Risk   > 6.5 Diabetic      Hgb A1c results are standardized based on the (NGSP) National   Glycohemoglobin Standardization Program.      Hemoglobin A1C levels are related to mean serum/plasma glucose   during the preceding 2-3 months.        09/08/2021 6.9 (H) 0.0 - 5.6 % Final     Comment:     Reference Interval:  5.0 - 5.6 Normal   5.7 - 6.4 High Risk   > 6.5 Diabetic      Hgb A1c results are standardized based on the (NGSP) National   Glycohemoglobin Standardization Program.      Hemoglobin A1C levels are related to mean serum/plasma glucose   during the preceding 2-3 months.                Review of Systems   Constitutional: Negative for chills and fever.   Cardiovascular:  Positive for leg swelling. Negative for claudication.   Respiratory:  Negative  for shortness of breath.    Skin:  Positive for nail changes. Negative for itching and rash.   Musculoskeletal:  Positive for arthritis. Negative for muscle cramps, muscle weakness and myalgias.   Gastrointestinal:  Negative for nausea and vomiting.   Neurological:  Positive for paresthesias. Negative for focal weakness, loss of balance and numbness.         Objective:      Physical Exam  Constitutional:       General: She is not in acute distress.     Appearance: She is well-developed. She is not diaphoretic.   Cardiovascular:      Pulses:           Dorsalis pedis pulses are 2+ on the right side and 2+ on the left side.        Posterior tibial pulses are 1+ on the right side and 1+ on the left side.      Comments: < 3 sec capillary refill time to toes 1-5 bilateral. Toes and feet are warm to touch proximally with normal distal cooling b/l. There is diminished hair growth on the feet and toes b/l. There is 1+ edema b/l with varicosities present b/l.     Musculoskeletal:      Comments: Equinus noted b/l ankles with < 10 deg DF noted. MMT 5/5 in DF/PF/Inv/Ev resistance with no reproduction of pain in any direction. Passive range of motion of ankle and pedal joints is painless b/l.       Feet:      Right foot:      Protective Sensation: 10 sites tested.  9 sites sensed.      Left foot:      Protective Sensation: 10 sites tested.  9 sites sensed.   Skin:     General: Skin is warm and dry.      Coloration: Skin is not pale.      Findings: No abrasion, bruising, burn, ecchymosis, erythema, laceration, lesion, petechiae or rash.      Nails: There is no clubbing.      Comments: Skin thin and atrophic         Neurological:      Mental Status: She is alert and oriented to person, place, and time.      Sensory: No sensory deficit.      Motor: No tremor, atrophy or abnormal muscle tone.      Comments: Negative tinel sign bilateral.   Psychiatric:         Behavior: Behavior normal.             Assessment:       Encounter  Diagnosis   Name Primary?    Type II diabetes mellitus with peripheral circulatory disorder Yes           Plan:       Julee was seen today for diabetic foot exam.    Diagnoses and all orders for this visit:    Type II diabetes mellitus with peripheral circulatory disorder      I counseled the patient on her conditions, their implications and medical management.    Shoe inspection. Diabetic Foot Education. Patient reminded of the importance of good nutrition and blood sugar control to help prevent podiatric complications of diabetes. Patient instructed on proper foot hygeine. We discussed wearing proper shoe gear, daily foot inspections, never walking without protective shoe gear, never putting sharp instruments to feet    Recommend only supportive shoes at all times    Return 1 year diabetic foot check    Fransico Celis DPM

## 2022-11-10 ENCOUNTER — TELEPHONE (OUTPATIENT)
Dept: REHABILITATION | Facility: HOSPITAL | Age: 72
End: 2022-11-10
Payer: MEDICARE

## 2022-11-10 ENCOUNTER — DOCUMENTATION ONLY (OUTPATIENT)
Dept: REHABILITATION | Facility: HOSPITAL | Age: 72
End: 2022-11-10

## 2022-11-10 ENCOUNTER — PATIENT MESSAGE (OUTPATIENT)
Dept: REHABILITATION | Facility: HOSPITAL | Age: 72
End: 2022-11-10

## 2022-11-10 NOTE — PROGRESS NOTES
Pt completed Healthy Back program on 4/5/22 and started participation in wellness. She fell in late spring, injuring left knee. She underwent arthroscopic surgery by Dr. Godoy on 10/4/22 and had physical therapy until 11/9/22. She requested to resume the wellness portion of her physical therapy. She has not continued with her exercises for her back and reports stiffness in morning. Reviewed her HEP which includes the following:  Prone on elbows 2 min  Press ups 2/10  LTR 5x 5  Bridging 2/10  Standing extension x10  She has no pain with exercises. Discussed stretching first thing in morning to alleviate stiffness.   She is safe to resume wellness. Will decrease resistance with exercises by 10% as needed since it has been several months since participation.   End date for wellness is 1/12/23.  She is scheduled for wellness visit on 11/15/22.  Kelsi Ulloa PT

## 2022-11-10 NOTE — TELEPHONE ENCOUNTER
Pt messaged christine to see about getting back into wellness. She needs to get reassessed before returning; called to get her scheduled with Christine. No answer so left message.

## 2022-11-15 ENCOUNTER — DOCUMENTATION ONLY (OUTPATIENT)
Dept: REHABILITATION | Facility: HOSPITAL | Age: 72
End: 2022-11-15
Payer: MEDICARE

## 2022-11-15 NOTE — PROGRESS NOTES
"Health  Wellness Visit Note    Name: Julee Bee  Clinic Number: [id number]  Physician: No ref. provider found  Diagnosis: No diagnosis found.  Past Medical History:   Diagnosis Date    Back pain     Colon polyp     GERD (gastroesophageal reflux disease)     H/O bronchitis     H/O: pneumonia     Hearing aid worn     Hyperlipidemia     Hypertension     IBS (irritable bowel syndrome)      Visit Number: 24   Precautions: Standard and Diabetes    1st PT visit: 1/12/2022  Year of care end date: 1/12/2023  6 Month test performed: July 2022  12 Month test performed: January 2023  Mind body plan: Plan A- 2 months   Patient level: B    Time In: 9:50 AM  Time Out: 10:41 PM  Total Treatment Time:  41 minutes    Wellness Vision 2022  Handout on this week's wellness topic Sleep Journal provided  along with a discussion on what it means, the benefits, and suggestions for practice.  Reviewed last week's topic of Breathing Exercises.    Subjective:   Patient reports that her lower back has some pain today,she rates it as a 5/10. Pt took a couple months off due to knee surgery. HC will be easing her back into wellness so that way her knee does not get agitated.  She completed about a month of pt for her knee-she feels that it is at 80%. She started doing her home stretches last week after seeing [facility]. Pt is not doing any type of physical activity besides house work. She is using ice and heat when pain occurs.     She has bursitis in left hip. She is due for injections in December.     Objective:   Julee completed therapeutic stretches (Prone on elbows 2 min, Press ups   2/10 (decrease range 2/2 pain), Bridging 2/10, Standing extension x10, LTR 20x, Piriformis 30" 2x) and the following MedX exercise machines: core lumbar, torso rotation l/r, leg extension, leg curl, upright row, chest press, biceps curl, triceps extension, leg press.    See exercise log in patient folder for rate of exertion and repetitions completed. "       Fitness Machine Education Key:  E=education on equipment initiated and further follow up and education needed  I=independent with  and exercise.  The patient:  Adjusts machines to his/her settings  Uses equipment levers, pins, weights safely  Maintains safe and correct posture while exercising  Moves through exercise with correct pace and control  Gets on and off equipment safely      Lumbar/Cervical Ext.  Torso Rotation  Leg Press    Leg Extension  Seated Leg Curl  Chest Press    Seated Row  Hip ADD  Hip ABD    Triceps Extension  Bicep Curl  Other:        Assessment:   Patient tolerated MedX Core Lumbar Machine and all other peripheral machines. Patient warmed up on recumbent bike, stretched, and skipped ice after workout.    Plan:  Continue with established plan of care towards wellness goals.     Health  : Inessa Perez  11/15/2022

## 2022-11-29 ENCOUNTER — DOCUMENTATION ONLY (OUTPATIENT)
Dept: REHABILITATION | Facility: HOSPITAL | Age: 72
End: 2022-11-29
Payer: MEDICARE

## 2022-11-29 NOTE — PROGRESS NOTES
"Health  Wellness Visit Note    Name: Julee Bee  Clinic Number: [id number]  Physician: No ref. provider found  Diagnosis: No diagnosis found.  Past Medical History:   Diagnosis Date    Back pain     Colon polyp     GERD (gastroesophageal reflux disease)     H/O bronchitis     H/O: pneumonia     Hearing aid worn     Hyperlipidemia     Hypertension     IBS (irritable bowel syndrome)      Visit Number: 24   Precautions: Standard and Diabetes    1st PT visit: 1/12/2022  Year of care end date: 1/12/2023  6 Month test performed: July 2022  12 Month test performed: January 2023  Mind body plan: Plan A- 2 months   Patient level: B    Time In: 10:50 AM  Time Out: 11:40 PM  Total Treatment Time: 50  minutes    Wellness Vision 2022  Handout on this week's wellness topic ore movement more sleep provided  along with a discussion on what it means, the benefits, and suggestions for practice.  Reviewed last week's topic of[employer] Journal .    Subjective:   Patient reports that her lower back has some pain today,she rates it as a 4/10. She states that she is sore.  Pt took a couple months off due to knee surgery. HC will be easing her back into wellness so that way her knee does not get agitated.  She completed about a month of pt for her knee-she feels that it is at 80%. She started doing her home stretches last week after seeing Kelsi. Pt is not doing any type of physical activity besides house work. She is using ice and heat when pain occurs.     She has bursitis in left hip. She is due for injections in December.     Objective:   Julee completed therapeutic stretches (Prone on elbows 2 min, Press ups   2/10 (decrease range 2/2 pain), Bridging 2/10, Standing extension x10, LTR 20x, Piriformis 30" 2x) and the following MedX exercise machines: core lumbar, torso rotation l/r, leg extension, leg curl, upright row, chest press, biceps curl, triceps extension, leg press.    See exercise log in patient folder for rate of " exertion and repetitions completed.       Fitness Machine Education Key:  E=education on equipment initiated and further follow up and education needed  I=independent with  and exercise.  The patient:  Adjusts machines to his/her settings  Uses equipment levers, pins, weights safely  Maintains safe and correct posture while exercising  Moves through exercise with correct pace and control  Gets on and off equipment safely      Lumbar/Cervical Ext.  Torso Rotation  Leg Press    Leg Extension  Seated Leg Curl  Chest Press    Seated Row  Hip ADD  Hip ABD    Triceps Extension  Bicep Curl  Other:        Assessment:   Patient tolerated MedX Core Lumbar Machine and all other peripheral machines. Patient warmed up on recumbent bike, stretched, and skipped ice after workout.    Plan:  Continue with established plan of care towards wellness goals.     Health  : Inessa Perez  11/29/2022

## 2022-12-06 ENCOUNTER — DOCUMENTATION ONLY (OUTPATIENT)
Dept: REHABILITATION | Facility: HOSPITAL | Age: 72
End: 2022-12-06
Payer: MEDICARE

## 2022-12-06 NOTE — PROGRESS NOTES
"Health  Wellness Visit Note    Name: Julee Bee  Clinic Number: [id number]  Physician: No ref. provider found  Diagnosis: No diagnosis found.  Past Medical History:   Diagnosis Date    Back pain     Colon polyp     GERD (gastroesophageal reflux disease)     H/O bronchitis     H/O: pneumonia     Hearing aid worn     Hyperlipidemia     Hypertension     IBS (irritable bowel syndrome)      Visit Number: 26  Precautions: Standard and Diabetes    1st PT visit: 1/12/2022  Year of care end date: 1/12/2023  6 Month test performed: July 2022  12 Month test performed: January 2023  Mind body plan: Plan A- 2 months   Patient level: B    Time In: 10:50 AM  Time Out: 11:50 PM  Total Treatment Time: 60  minutes    Wellness Vision 2022  Handout on this week's wellness topic Stress provided  along with a discussion on what it means, the benefits, and suggestions for practice.  Reviewed last week's topic on more movement more sleep.    Subjective:   Patient reports that her lower back has some pain today,she rates it as a 4/10. She states that she is sore- this is everyday. Pt took a couple months off due to knee surgery. HC will be easing her back into wellness so that way her knee does not get agitated.  Pt is doing home stretches everyday.  Pt is not doing any type of physical activity besides house work. She is using ice and heat when pain occurs.     She has bursitis in left hip. She is due for injections in December.     Objective:   Julee completed therapeutic stretches (Prone on elbows 2 min, Press ups   2/10 (decrease range 2/2 pain), Bridging 2/10, Standing extension x10, LTR 20x, Piriformis 30" 2x) and the following [employer] exercise machines: core lumbar, torso rotation l/r, leg extension, leg curl, upright row, chest press, biceps curl, triceps extension, leg press.    See exercise log in patient folder for rate of exertion and repetitions completed.       Fitness Machine Education Key:  E=education on " equipment initiated and further follow up and education needed  I=independent with  and exercise.  The patient:  Adjusts machines to his/her settings  Uses equipment levers, pins, weights safely  Maintains safe and correct posture while exercising  Moves through exercise with correct pace and control  Gets on and off equipment safely      Lumbar/Cervical Ext.  Torso Rotation  Leg Press    Leg Extension  Seated Leg Curl  Chest Press    Seated Row  Hip ADD  Hip ABD    Triceps Extension  Bicep Curl  Other:        Assessment:   Patient tolerated MedX Core Lumbar Machine and all other peripheral machines. Patient warmed up on recumbent bike, stretched, and skipped ice after workout.    Plan:  Continue with established plan of care towards wellness goals.     Health  : Inessa Perez  12/6/2022

## 2022-12-20 ENCOUNTER — DOCUMENTATION ONLY (OUTPATIENT)
Dept: REHABILITATION | Facility: HOSPITAL | Age: 72
End: 2022-12-20
Payer: MEDICARE

## 2022-12-20 NOTE — PROGRESS NOTES
"Health  Wellness Visit Note    Name: Julee Bee  Clinic Number: [id number]  Physician: No ref. provider found  Diagnosis: No diagnosis found.  Past Medical History:   Diagnosis Date    Back pain     Colon polyp     GERD (gastroesophageal reflux disease)     H/O bronchitis     H/O: pneumonia     Hearing aid worn     Hyperlipidemia     Hypertension     IBS (irritable bowel syndrome)      Visit Number: 27  Precautions: Standard and Diabetes    1st PT visit: 1/12/2022  Year of care end date: 1/12/2023  6 Month test performed: July 2022  12 Month test performed: January 2023  Mind body plan: Plan A- 2 months   Patient level: B    Time In: 11:21 AM  Time Out:  12:21 PM  Total Treatment Time:   60 minutes    Wellness Vision 2022  Handout on this week's wellness topic time management provided  along with a discussion on what it means, the benefits, and suggestions for practice.  Reviewed last week's topic- skipped week on pete     Subjective:   Patient reports that her lower back has some pain today,she rates it as a 3/10. She states that she is sore- this is everyday. Pt took a couple months off due to knee surgery.She skipped last week because was on a trip. She also notices that her pain is increases. HC will be easing her back into wellness so that way her knee does not get agitated.  Pt is doing home stretches everyday.  Pt is not doing any type of physical activity besides house work. She is using ice and heat when pain occurs.     She has bursitis in left hip. She is due for injections in December.     Objective:   Julee completed therapeutic stretches (Prone on elbows 2 min, Press ups   2/10 (decrease range 2/2 pain), Bridging 2/10, Standing extension x10, LTR 20x, Piriformis 30" 2x) and the following MedX exercise machines: core lumbar, torso rotation l/r, leg extension, leg curl, upright row, chest press, biceps curl, triceps extension, leg press.    See exercise log in patient folder for rate of " exertion and repetitions completed.       Fitness Machine Education Key:  E=education on equipment initiated and further follow up and education needed  I=independent with  and exercise.  The patient:  Adjusts machines to his/her settings  Uses equipment levers, pins, weights safely  Maintains safe and correct posture while exercising  Moves through exercise with correct pace and control  Gets on and off equipment safely      Lumbar/Cervical Ext.  Torso Rotation  Leg Press    Leg Extension  Seated Leg Curl  Chest Press    Seated Row  Hip ADD  Hip ABD    Triceps Extension  Bicep Curl  Other:        Assessment:   Patient tolerated MedX Core Lumbar Machine and all other peripheral machines. Patient warmed up on recumbent bike, stretched, and skipped ice after workout.    Plan:  Continue with established plan of care towards wellness goals.     Health  : Inessa Perez  12/20/2022

## 2022-12-27 ENCOUNTER — DOCUMENTATION ONLY (OUTPATIENT)
Dept: REHABILITATION | Facility: HOSPITAL | Age: 72
End: 2022-12-27
Payer: MEDICARE

## 2022-12-27 NOTE — PROGRESS NOTES
"Health  Wellness Visit Note    Name: Julee Bee  Clinic Number: [id number]  Physician: No ref. provider found  Diagnosis: No diagnosis found.  Past Medical History:   Diagnosis Date    Back pain     Colon polyp     GERD (gastroesophageal reflux disease)     H/O bronchitis     H/O: pneumonia     Hearing aid worn     Hyperlipidemia     Hypertension     IBS (irritable bowel syndrome)      Visit Number: 28  Precautions: Standard and Diabetes    1st PT visit: 1/12/2022  Year of care end date: 1/12/2023  6 Month test performed: July 2022  12 Month test performed: January 2023  Mind body plan: Plan A- 2 months   Patient level: B    Time In: 10:45 AM  Time Out:  11:35 PM  Total Treatment Time: 50  minutes    Wellness Vision 2022  Handout on this week's wellness topic organizing  provided  along with a discussion on what it means, the benefits, and suggestions for practice.  Reviewed last week's topic- time management    Subjective:   Patient reports that her lower back has some pain today,she rates it as a 3/10. Most of her pain stems from walking around the cruise she was on. She states that she is sore- this is everyday. Pt took a couple months off due to knee surgery.She skipped last week because was on a trip. She also notices that her pain is increases. HC will be easing her back into wellness so that way her knee does not get agitated.  Pt is doing home stretches everyday.  Pt is not doing any type of physical activity besides house work. She is using ice and heat when pain occurs. Plans to go to [employer] after discharged.     She has bursitis in left hip. She is due for injections in December.     Objective:   Julee completed therapeutic stretches (Prone on elbows 2 min, Press ups   2/10 (decrease range 2/2 pain), Bridging 2/10, Standing extension x10, LTR 20x, Piriformis 30" 2x) and the following MedX exercise machines: core lumbar, torso rotation l/r, leg extension, leg curl, upright row, chest " press, biceps curl, triceps extension, leg press.    See exercise log in patient folder for rate of exertion and repetitions completed.       Fitness Machine Education Key:  E=education on equipment initiated and further follow up and education needed  I=independent with  and exercise.  The patient:  Adjusts machines to his/her settings  Uses equipment levers, pins, weights safely  Maintains safe and correct posture while exercising  Moves through exercise with correct pace and control  Gets on and off equipment safely      Lumbar/Cervical Ext. E Torso Rotation E Leg Press E   Leg Extension E Seated Leg Curl E Chest Press E   Seated Row E Hip ADD E Hip ABD E   Triceps Extension E Bicep Curl E Other: X       Assessment:   Patient tolerated MedX Core Lumbar Machine and all other peripheral machines. Patient warmed up on recumbent bike, stretched, and skipped ice after workout.    Plan:  Continue with established plan of care towards wellness goals.     Health  : Inessa Perez  12/27/2022

## 2023-01-06 ENCOUNTER — DOCUMENTATION ONLY (OUTPATIENT)
Dept: REHABILITATION | Facility: HOSPITAL | Age: 73
End: 2023-01-06
Payer: MEDICARE

## 2023-01-06 NOTE — PROGRESS NOTES
"Health  Wellness Visit Note    Name: Julee Bee  Clinic Number: [id number]  Physician: No ref. provider found  Diagnosis: No diagnosis found.  Past Medical History:   Diagnosis Date    Back pain     Colon polyp     GERD (gastroesophageal reflux disease)     H/O bronchitis     H/O: pneumonia     Hearing aid worn     Hyperlipidemia     Hypertension     IBS (irritable bowel syndrome)      Visit Number: 29  Precautions: Standard and Diabetes    1st PT visit: 1/12/2022  Year of care end date: 1/12/2023  6 Month test performed: July 2022  12 Month test performed: January 2023  Mind body plan: Plan A- 2 months   Patient level: B    Time In: 10:45 AM  Time Out:  11:40 PM  Total Treatment Time: 55 minutes    Wellness Vision 2022  Handout on this week's wellness topic gratitude provided  along with a discussion on what it means, the benefits, and suggestions for practice.  Reviewed last week's topic- organizing      Subjective:   Patient reports that her lower back has some pain today,she rates it as a 4/10. She states that she is sore- this is everyday. Pt took a couple months off due to knee surgery.She skipped last week because was on a trip. She also notices that her pain is increases. HC will be teaching her home exercises before she is discharged. Pt is doing home stretches everyday.  Pt is not doing any type of physical activity besides house work. She is using ice and heat when pain occurs. Plans to go to [employer] after discharged.     She has bursitis in left hip. She is due for injections in December.     Objective:   Julee completed therapeutic stretches (Prone on elbows 2 min, Press ups   2/10 (decrease range 2/2 pain), Bridging 2/10, Standing extension x10, LTR 20x, Piriformis 30" 2x) and the following MedX exercise machines: core lumbar, torso rotation l/r, leg extension, leg curl, upright row, chest press, biceps curl, triceps extension, leg press.    See exercise log in patient folder for " rate of exertion and repetitions completed.       Fitness Machine Education Key:  E=education on equipment initiated and further follow up and education needed  I=independent with  and exercise.  The patient:  Adjusts machines to his/her settings  Uses equipment levers, pins, weights safely  Maintains safe and correct posture while exercising  Moves through exercise with correct pace and control  Gets on and off equipment safely      Lumbar/Cervical Ext. E Torso Rotation E Leg Press E   Leg Extension E Seated Leg Curl E Chest Press E   Seated Row E Hip ADD E Hip ABD E   Triceps Extension E Bicep Curl E Other: X       Assessment:   Patient tolerated MedX Core Lumbar Machine and all other peripheral machines. Patient warmed up on recumbent bike, stretched, and skipped ice after workout.    Plan:  Continue with established plan of care towards wellness goals.     Health  : Inessa Perez  1/6/2023

## 2023-01-12 ENCOUNTER — TELEPHONE (OUTPATIENT)
Dept: OBSTETRICS AND GYNECOLOGY | Facility: CLINIC | Age: 73
End: 2023-01-12
Payer: MEDICARE

## 2023-01-12 DIAGNOSIS — M89.9 BONE/CARTILAGE DISORDER: ICD-10-CM

## 2023-01-12 DIAGNOSIS — Z12.31 VISIT FOR SCREENING MAMMOGRAM: Primary | ICD-10-CM

## 2023-01-12 DIAGNOSIS — Z12.31 ENCOUNTER FOR SCREENING MAMMOGRAM FOR BREAST CANCER: Primary | ICD-10-CM

## 2023-01-12 DIAGNOSIS — M94.9 BONE/CARTILAGE DISORDER: ICD-10-CM

## 2023-02-20 ENCOUNTER — HOSPITAL ENCOUNTER (OUTPATIENT)
Dept: RADIOLOGY | Facility: HOSPITAL | Age: 73
Discharge: HOME | End: 2023-02-20
Attending: OBSTETRICS & GYNECOLOGY
Payer: MEDICARE

## 2023-02-20 DIAGNOSIS — Z12.31 ENCOUNTER FOR SCREENING MAMMOGRAM FOR BREAST CANCER: ICD-10-CM

## 2023-02-20 PROCEDURE — 77063 BREAST TOMOSYNTHESIS BI: CPT | Mod: 26,,, | Performed by: RADIOLOGY

## 2023-02-20 PROCEDURE — 77063 MAMMO DIGITAL SCREENING BILAT WITH TOMO: ICD-10-PCS | Mod: 26,,, | Performed by: RADIOLOGY

## 2023-02-20 PROCEDURE — 77067 SCR MAMMO BI INCL CAD: CPT | Mod: TC,PN

## 2023-02-20 PROCEDURE — 77067 MAMMO DIGITAL SCREENING BILAT WITH TOMO: ICD-10-PCS | Mod: 26,,, | Performed by: RADIOLOGY

## 2023-02-20 PROCEDURE — 77067 SCR MAMMO BI INCL CAD: CPT | Mod: 26,,, | Performed by: RADIOLOGY

## 2023-05-10 ENCOUNTER — IMMUNIZATION (OUTPATIENT)
Dept: FAMILY MEDICINE | Facility: CLINIC | Age: 73
End: 2023-05-10
Payer: MEDICARE

## 2023-05-10 DIAGNOSIS — Z23 NEED FOR VACCINATION: Primary | ICD-10-CM

## 2023-05-10 PROCEDURE — 0124A COVID-19, MRNA, LNP-S, BIVALENT BOOSTER, PF, 30 MCG/0.3 ML DOSE: CPT | Mod: PBBFAC | Performed by: FAMILY MEDICINE

## 2023-05-10 PROCEDURE — 91312 COVID-19, MRNA, LNP-S, BIVALENT BOOSTER, PF, 30 MCG/0.3 ML DOSE: ICD-10-PCS | Mod: S$GLB,,, | Performed by: FAMILY MEDICINE

## 2023-05-10 PROCEDURE — 91312 COVID-19, MRNA, LNP-S, BIVALENT BOOSTER, PF, 30 MCG/0.3 ML DOSE: CPT | Mod: S$GLB,,, | Performed by: FAMILY MEDICINE

## 2023-08-29 ENCOUNTER — PATIENT MESSAGE (OUTPATIENT)
Dept: OTHER | Facility: OTHER | Age: 73
End: 2023-08-29
Payer: MEDICARE

## 2023-09-04 ENCOUNTER — PATIENT MESSAGE (OUTPATIENT)
Dept: ADMINISTRATIVE | Facility: OTHER | Age: 73
End: 2023-09-04
Payer: MEDICARE

## 2023-09-04 ENCOUNTER — PATIENT MESSAGE (OUTPATIENT)
Dept: OTHER | Facility: OTHER | Age: 73
End: 2023-09-04
Payer: MEDICARE

## 2023-09-20 ENCOUNTER — PATIENT MESSAGE (OUTPATIENT)
Dept: OTHER | Facility: OTHER | Age: 73
End: 2023-09-20
Payer: MEDICARE

## 2023-10-11 ENCOUNTER — PATIENT MESSAGE (OUTPATIENT)
Dept: OTHER | Facility: OTHER | Age: 73
End: 2023-10-11
Payer: MEDICARE

## 2023-12-04 ENCOUNTER — OFFICE VISIT (OUTPATIENT)
Dept: PODIATRY | Facility: CLINIC | Age: 73
End: 2023-12-04
Payer: MEDICARE

## 2023-12-04 VITALS — WEIGHT: 153 LBS | HEIGHT: 60 IN | BODY MASS INDEX: 30.04 KG/M2

## 2023-12-04 DIAGNOSIS — E11.51 TYPE II DIABETES MELLITUS WITH PERIPHERAL CIRCULATORY DISORDER: Primary | ICD-10-CM

## 2023-12-04 PROCEDURE — 1159F PR MEDICATION LIST DOCUMENTED IN MEDICAL RECORD: ICD-10-PCS | Mod: CPTII,S$GLB,, | Performed by: PODIATRIST

## 2023-12-04 PROCEDURE — 1101F PT FALLS ASSESS-DOCD LE1/YR: CPT | Mod: CPTII,S$GLB,, | Performed by: PODIATRIST

## 2023-12-04 PROCEDURE — 4010F ACE/ARB THERAPY RXD/TAKEN: CPT | Mod: CPTII,S$GLB,, | Performed by: PODIATRIST

## 2023-12-04 PROCEDURE — 1101F PR PT FALLS ASSESS DOC 0-1 FALLS W/OUT INJ PAST YR: ICD-10-PCS | Mod: CPTII,S$GLB,, | Performed by: PODIATRIST

## 2023-12-04 PROCEDURE — 99213 PR OFFICE/OUTPT VISIT, EST, LEVL III, 20-29 MIN: ICD-10-PCS | Mod: S$GLB,,, | Performed by: PODIATRIST

## 2023-12-04 PROCEDURE — 1157F PR ADVANCE CARE PLAN OR EQUIV PRESENT IN MEDICAL RECORD: ICD-10-PCS | Mod: CPTII,S$GLB,, | Performed by: PODIATRIST

## 2023-12-04 PROCEDURE — 1126F AMNT PAIN NOTED NONE PRSNT: CPT | Mod: CPTII,S$GLB,, | Performed by: PODIATRIST

## 2023-12-04 PROCEDURE — 4010F PR ACE/ARB THEARPY RXD/TAKEN: ICD-10-PCS | Mod: CPTII,S$GLB,, | Performed by: PODIATRIST

## 2023-12-04 PROCEDURE — 3044F HG A1C LEVEL LT 7.0%: CPT | Mod: CPTII,S$GLB,, | Performed by: PODIATRIST

## 2023-12-04 PROCEDURE — 1160F PR REVIEW ALL MEDS BY PRESCRIBER/CLIN PHARMACIST DOCUMENTED: ICD-10-PCS | Mod: CPTII,S$GLB,, | Performed by: PODIATRIST

## 2023-12-04 PROCEDURE — 1157F ADVNC CARE PLAN IN RCRD: CPT | Mod: CPTII,S$GLB,, | Performed by: PODIATRIST

## 2023-12-04 PROCEDURE — 1160F RVW MEDS BY RX/DR IN RCRD: CPT | Mod: CPTII,S$GLB,, | Performed by: PODIATRIST

## 2023-12-04 PROCEDURE — 1159F MED LIST DOCD IN RCRD: CPT | Mod: CPTII,S$GLB,, | Performed by: PODIATRIST

## 2023-12-04 PROCEDURE — 99213 OFFICE O/P EST LOW 20 MIN: CPT | Mod: S$GLB,,, | Performed by: PODIATRIST

## 2023-12-04 PROCEDURE — 1126F PR PAIN SEVERITY QUANTIFIED, NO PAIN PRESENT: ICD-10-PCS | Mod: CPTII,S$GLB,, | Performed by: PODIATRIST

## 2023-12-04 PROCEDURE — 3288F PR FALLS RISK ASSESSMENT DOCUMENTED: ICD-10-PCS | Mod: CPTII,S$GLB,, | Performed by: PODIATRIST

## 2023-12-04 PROCEDURE — 3288F FALL RISK ASSESSMENT DOCD: CPT | Mod: CPTII,S$GLB,, | Performed by: PODIATRIST

## 2023-12-04 PROCEDURE — 99999 PR PBB SHADOW E&M-EST. PATIENT-LVL III: ICD-10-PCS | Mod: PBBFAC,,, | Performed by: PODIATRIST

## 2023-12-04 PROCEDURE — 99999 PR PBB SHADOW E&M-EST. PATIENT-LVL III: CPT | Mod: PBBFAC,,, | Performed by: PODIATRIST

## 2023-12-04 PROCEDURE — 3044F PR MOST RECENT HEMOGLOBIN A1C LEVEL <7.0%: ICD-10-PCS | Mod: CPTII,S$GLB,, | Performed by: PODIATRIST

## 2023-12-04 NOTE — PROGRESS NOTES
Subjective:      Patient ID: Julee Bee is a 73 y.o. female.    Chief Complaint: Diabetic Foot Exam      Julee is a 73 y.o. female who presents to the clinic upon referral from Dr. Ruma bernal. provider found  for evaluation and treatment of diabetic feet. Julee has a past medical history of Back pain, Colon polyp, GERD (gastroesophageal reflux disease), H/O bronchitis, H/O: pneumonia, Hearing aid worn, Hyperlipidemia, Hypertension, and IBS (irritable bowel syndrome). Here for her annual diabetic foot check up no acute changes since last year doing well overall      PCP: Devin Denton MD        Current shoe gear: Casual shoes    Hemoglobin A1C   Date Value Ref Range Status   09/22/2023 6.1 (H) 0.0 - 5.6 % Final     Comment:     Reference Interval:  5.0 - 5.6 Normal   5.7 - 6.4 High Risk   > 6.5 Diabetic      Hgb A1c results are standardized based on the (NGSP) National   Glycohemoglobin Standardization Program.      Hemoglobin A1C levels are related to mean serum/plasma glucose   during the preceding 2-3 months.        03/11/2023 6.0 (H) 0.0 - 5.6 % Final     Comment:     Reference Interval:  5.0 - 5.6 Normal   5.7 - 6.4 High Risk   > 6.5 Diabetic      Hgb A1c results are standardized based on the (NGSP) National   Glycohemoglobin Standardization Program.      Hemoglobin A1C levels are related to mean serum/plasma glucose   during the preceding 2-3 months.        09/09/2022 6.0 (H) 0.0 - 5.6 % Final     Comment:     Reference Interval:  5.0 - 5.6 Normal   5.7 - 6.4 High Risk   > 6.5 Diabetic      Hgb A1c results are standardized based on the (NGSP) National   Glycohemoglobin Standardization Program.      Hemoglobin A1C levels are related to mean serum/plasma glucose   during the preceding 2-3 months.                Review of Systems   Constitutional: Negative for chills and fever.   Cardiovascular:  Positive for leg swelling. Negative for claudication.   Respiratory:  Negative for shortness of breath.     Skin:  Positive for nail changes. Negative for itching and rash.   Musculoskeletal:  Positive for arthritis. Negative for muscle cramps, muscle weakness and myalgias.   Gastrointestinal:  Negative for nausea and vomiting.   Neurological:  Positive for paresthesias. Negative for focal weakness, loss of balance and numbness.           Objective:      Physical Exam  Constitutional:       General: She is not in acute distress.     Appearance: She is well-developed. She is not diaphoretic.   Cardiovascular:      Pulses:           Dorsalis pedis pulses are 2+ on the right side and 2+ on the left side.        Posterior tibial pulses are 1+ on the right side and 1+ on the left side.      Comments: < 3 sec capillary refill time to toes 1-5 bilateral. Toes and feet are warm to touch proximally with normal distal cooling b/l. There is diminished hair growth on the feet and toes b/l. There is 1+ edema b/l with varicosities present b/l.     Musculoskeletal:      Comments: Equinus noted b/l ankles with < 10 deg DF noted. MMT 5/5 in DF/PF/Inv/Ev resistance with no reproduction of pain in any direction. Passive range of motion of ankle and pedal joints is painless b/l.       Feet:      Right foot:      Protective Sensation: 10 sites tested.  10 sites sensed.      Left foot:      Protective Sensation: 10 sites tested.  10 sites sensed.   Skin:     General: Skin is warm and dry.      Coloration: Skin is not pale.      Findings: No abrasion, bruising, burn, ecchymosis, erythema, laceration, lesion, petechiae or rash.      Nails: There is no clubbing.      Comments: Skin thin and atrophic         Neurological:      Mental Status: She is alert and oriented to person, place, and time.      Sensory: No sensory deficit.      Motor: No tremor, atrophy or abnormal muscle tone.      Comments: Negative tinel sign bilateral.   Psychiatric:         Behavior: Behavior normal.               Assessment:       Encounter Diagnosis   Name Primary?     Type II diabetes mellitus with peripheral circulatory disorder Yes             Plan:       Julee was seen today for diabetic foot exam.    Diagnoses and all orders for this visit:    Type II diabetes mellitus with peripheral circulatory disorder          I counseled the patient on her conditions, their implications and medical management.    Shoe inspection. Diabetic Foot Education. Patient reminded of the importance of good nutrition and blood sugar control to help prevent podiatric complications of diabetes. Patient instructed on proper foot hygeine. We discussed wearing proper shoe gear, daily foot inspections, never walking without protective shoe gear, never putting sharp instruments to feet    Recommend only supportive shoes at all times    Return 1 year diabetic foot check    Fransico Celis DPM

## 2024-01-25 ENCOUNTER — TELEPHONE (OUTPATIENT)
Dept: OBSTETRICS AND GYNECOLOGY | Facility: CLINIC | Age: 74
End: 2024-01-25
Payer: MEDICARE

## 2024-01-25 DIAGNOSIS — Z12.31 ENCOUNTER FOR SCREENING MAMMOGRAM FOR BREAST CANCER: Primary | ICD-10-CM

## 2024-01-31 ENCOUNTER — PATIENT MESSAGE (OUTPATIENT)
Dept: OTHER | Facility: OTHER | Age: 74
End: 2024-01-31
Payer: MEDICARE

## 2024-02-21 ENCOUNTER — HOSPITAL ENCOUNTER (OUTPATIENT)
Dept: RADIOLOGY | Facility: HOSPITAL | Age: 74
Discharge: HOME | End: 2024-02-21
Attending: OBSTETRICS & GYNECOLOGY
Payer: MEDICARE

## 2024-02-21 DIAGNOSIS — Z12.31 ENCOUNTER FOR SCREENING MAMMOGRAM FOR BREAST CANCER: ICD-10-CM

## 2024-02-21 PROCEDURE — 77067 SCR MAMMO BI INCL CAD: CPT | Mod: 26,,, | Performed by: RADIOLOGY

## 2024-02-21 PROCEDURE — 77067 SCR MAMMO BI INCL CAD: CPT | Mod: TC,PO

## 2024-02-21 PROCEDURE — 77063 BREAST TOMOSYNTHESIS BI: CPT | Mod: 26,,, | Performed by: RADIOLOGY

## 2024-02-27 ENCOUNTER — PATIENT MESSAGE (OUTPATIENT)
Dept: OTHER | Facility: OTHER | Age: 74
End: 2024-02-27
Payer: MEDICARE

## 2024-04-17 ENCOUNTER — PATIENT MESSAGE (OUTPATIENT)
Dept: ADMINISTRATIVE | Facility: OTHER | Age: 74
End: 2024-04-17
Payer: MEDICARE

## 2024-04-17 ENCOUNTER — PATIENT MESSAGE (OUTPATIENT)
Dept: OTHER | Facility: OTHER | Age: 74
End: 2024-04-17
Payer: MEDICARE

## 2024-05-24 ENCOUNTER — PATIENT MESSAGE (OUTPATIENT)
Dept: OTHER | Facility: OTHER | Age: 74
End: 2024-05-24
Payer: MEDICARE

## 2024-08-26 ENCOUNTER — PATIENT MESSAGE (OUTPATIENT)
Dept: ADMINISTRATIVE | Facility: OTHER | Age: 74
End: 2024-08-26
Payer: MEDICARE

## 2024-12-04 ENCOUNTER — OFFICE VISIT (OUTPATIENT)
Dept: PODIATRY | Facility: CLINIC | Age: 74
End: 2024-12-04
Payer: MEDICARE

## 2024-12-04 VITALS — WEIGHT: 158.06 LBS | HEIGHT: 60 IN | BODY MASS INDEX: 31.03 KG/M2

## 2024-12-04 DIAGNOSIS — E11.51 TYPE II DIABETES MELLITUS WITH PERIPHERAL CIRCULATORY DISORDER: Primary | ICD-10-CM

## 2024-12-04 PROCEDURE — 99213 OFFICE O/P EST LOW 20 MIN: CPT | Mod: S$GLB,,, | Performed by: PODIATRIST

## 2024-12-04 PROCEDURE — 1160F RVW MEDS BY RX/DR IN RCRD: CPT | Mod: CPTII,S$GLB,, | Performed by: PODIATRIST

## 2024-12-04 PROCEDURE — 1159F MED LIST DOCD IN RCRD: CPT | Mod: CPTII,S$GLB,, | Performed by: PODIATRIST

## 2024-12-04 PROCEDURE — 3288F FALL RISK ASSESSMENT DOCD: CPT | Mod: CPTII,S$GLB,, | Performed by: PODIATRIST

## 2024-12-04 PROCEDURE — 1125F AMNT PAIN NOTED PAIN PRSNT: CPT | Mod: CPTII,S$GLB,, | Performed by: PODIATRIST

## 2024-12-04 PROCEDURE — 1101F PT FALLS ASSESS-DOCD LE1/YR: CPT | Mod: CPTII,S$GLB,, | Performed by: PODIATRIST

## 2024-12-04 PROCEDURE — 99999 PR PBB SHADOW E&M-EST. PATIENT-LVL III: CPT | Mod: PBBFAC,,, | Performed by: PODIATRIST

## 2024-12-04 PROCEDURE — 1157F ADVNC CARE PLAN IN RCRD: CPT | Mod: CPTII,S$GLB,, | Performed by: PODIATRIST

## 2024-12-04 NOTE — PROGRESS NOTES
Subjective:      Patient ID: Julee Bee is a 74 y.o. female.    Chief Complaint: Diabetic Foot Exam (PCP 11/15/24)      Julee is a 74 y.o. female who presents to the clinic upon referral from Dr. Ruma bernal. provider found  for evaluation and treatment of diabetic feet. Julee has a past medical history of Back pain, Colon polyp, GERD (gastroesophageal reflux disease), H/O bronchitis, H/O: pneumonia, Hearing aid worn, Hyperlipidemia, Hypertension, and IBS (irritable bowel syndrome). Here for her annual diabetic foot check up no acute changes since last year doing well overall, noticed more edema to the left LE however and relates having knee problems on that side going to have a knee replacement soon      PCP: Devin Denton MD        Current shoe gear: Casual shoes    Hemoglobin A1C   Date Value Ref Range Status   09/20/2024 6.0 (H) 0.0 - 5.6 % Final     Comment:     Reference Interval:  5.0 - 5.6 Normal   5.7 - 6.4 High Risk   > 6.5 Diabetic      Hgb A1c results are standardized based on the (NGSP) National   Glycohemoglobin Standardization Program.      Hemoglobin A1C levels are related to mean serum/plasma glucose   during the preceding 2-3 months.        03/21/2024 6.3 (H) 0.0 - 5.6 % Final     Comment:     Reference Interval:  5.0 - 5.6 Normal   5.7 - 6.4 High Risk   > 6.5 Diabetic      Hgb A1c results are standardized based on the (NGSP) National   Glycohemoglobin Standardization Program.      Hemoglobin A1C levels are related to mean serum/plasma glucose   during the preceding 2-3 months.        12/15/2023 6.1 (H) 0.0 - 5.6 % Final     Comment:     Reference Interval:  5.0 - 5.6 Normal   5.7 - 6.4 High Risk   > 6.5 Diabetic      Hgb A1c results are standardized based on the (NGSP) National   Glycohemoglobin Standardization Program.      Hemoglobin A1C levels are related to mean serum/plasma glucose   during the preceding 2-3 months.                Review of Systems   Constitutional: Negative for  chills and fever.   Cardiovascular:  Positive for leg swelling. Negative for claudication.   Respiratory:  Negative for shortness of breath.    Skin:  Positive for nail changes. Negative for itching and rash.   Musculoskeletal:  Positive for arthritis. Negative for muscle cramps, muscle weakness and myalgias.   Gastrointestinal:  Negative for nausea and vomiting.   Neurological:  Positive for paresthesias. Negative for focal weakness, loss of balance and numbness.           Objective:      Physical Exam  Constitutional:       General: She is not in acute distress.     Appearance: She is well-developed. She is not diaphoretic.   Cardiovascular:      Pulses:           Dorsalis pedis pulses are 2+ on the right side and 2+ on the left side.        Posterior tibial pulses are 1+ on the right side and 1+ on the left side.      Comments: < 3 sec capillary refill time to toes 1-5 bilateral. Toes and feet are warm to touch proximally with normal distal cooling b/l. There is diminished hair growth on the feet and toes b/l. There is 1+ edema b/l with varicosities present right and 2+ edema to the left LE    Musculoskeletal:      Comments: Equinus noted b/l ankles with < 10 deg DF noted. MMT 5/5 in DF/PF/Inv/Ev resistance with no reproduction of pain in any direction. Passive range of motion of ankle and pedal joints is painless b/l.       Feet:      Right foot:      Protective Sensation: 10 sites tested.  10 sites sensed.      Left foot:      Protective Sensation: 10 sites tested.  10 sites sensed.   Skin:     General: Skin is warm and dry.      Coloration: Skin is not pale.      Findings: No abrasion, bruising, burn, ecchymosis, erythema, laceration, lesion, petechiae or rash.      Nails: There is no clubbing.      Comments: Skin thin and atrophic         Neurological:      Mental Status: She is alert and oriented to person, place, and time.      Sensory: No sensory deficit.      Motor: No tremor, atrophy or abnormal muscle  tone.      Comments: Negative tinel sign bilateral.   Psychiatric:         Behavior: Behavior normal.               Assessment:       Encounter Diagnosis   Name Primary?    Type II diabetes mellitus with peripheral circulatory disorder Yes               Plan:       Julee was seen today for diabetic foot exam.    Diagnoses and all orders for this visit:    Type II diabetes mellitus with peripheral circulatory disorder            I counseled the patient on her conditions, their implications and medical management.    Shoe inspection. Diabetic Foot Education. Patient reminded of the importance of good nutrition and blood sugar control to help prevent podiatric complications of diabetes. Patient instructed on proper foot hygeine. We discussed wearing proper shoe gear, daily foot inspections, never walking without protective shoe gear, never putting sharp instruments to feet    Recommend only supportive shoes at all times    Recommend compression stockings for the edema    Return 1 year diabetic foot check    Fransico Celis DPM

## 2025-01-30 ENCOUNTER — TELEPHONE (OUTPATIENT)
Dept: OBSTETRICS AND GYNECOLOGY | Facility: CLINIC | Age: 75
End: 2025-01-30
Payer: MEDICARE

## 2025-01-30 DIAGNOSIS — Z12.31 ENCOUNTER FOR SCREENING MAMMOGRAM FOR BREAST CANCER: Primary | ICD-10-CM

## 2025-01-30 NOTE — TELEPHONE ENCOUNTER
----- Message from Jesse sent at 1/30/2025 10:47 AM CST -----  Type: Needs Testing Orders  Who Called:  pt  Best Call Back Number: [phone]  Additional Information: pt is calling the office to have her mammo ordered as she is due after 02/21. Please call back to advise. Thanks!

## 2025-01-30 NOTE — TELEPHONE ENCOUNTER
Pt wanted someone to call her and get her scheduled for her annual. I called but no answer so I left her message on her voicemail.

## 2025-02-24 ENCOUNTER — HOSPITAL ENCOUNTER (OUTPATIENT)
Dept: RADIOLOGY | Facility: HOSPITAL | Age: 75
Discharge: HOME | End: 2025-02-24
Attending: OBSTETRICS & GYNECOLOGY
Payer: MEDICARE

## 2025-02-24 DIAGNOSIS — Z12.31 ENCOUNTER FOR SCREENING MAMMOGRAM FOR BREAST CANCER: ICD-10-CM

## 2025-02-24 PROCEDURE — 77067 SCR MAMMO BI INCL CAD: CPT | Mod: TC,PN

## 2025-02-24 PROCEDURE — 77063 BREAST TOMOSYNTHESIS BI: CPT | Mod: 26,,, | Performed by: RADIOLOGY

## 2025-02-24 PROCEDURE — 77067 SCR MAMMO BI INCL CAD: CPT | Mod: 26,,, | Performed by: RADIOLOGY

## 2025-02-25 ENCOUNTER — RESULTS FOLLOW-UP (OUTPATIENT)
Dept: OBSTETRICS AND GYNECOLOGY | Facility: CLINIC | Age: 75
End: 2025-02-25

## 2025-03-14 ENCOUNTER — OFFICE VISIT (OUTPATIENT)
Dept: URGENT CARE | Facility: CLINIC | Age: 75
End: 2025-03-14
Payer: MEDICARE

## 2025-03-14 VITALS
WEIGHT: 159 LBS | BODY MASS INDEX: 31.22 KG/M2 | DIASTOLIC BLOOD PRESSURE: 69 MMHG | SYSTOLIC BLOOD PRESSURE: 130 MMHG | OXYGEN SATURATION: 96 % | HEART RATE: 95 BPM | HEIGHT: 60 IN | RESPIRATION RATE: 17 BRPM | TEMPERATURE: 100 F

## 2025-03-14 DIAGNOSIS — R68.89 FLU-LIKE SYMPTOMS: ICD-10-CM

## 2025-03-14 DIAGNOSIS — R50.9 FEVER, UNSPECIFIED FEVER CAUSE: Primary | ICD-10-CM

## 2025-03-14 DIAGNOSIS — J06.9 VIRAL URI WITH COUGH: ICD-10-CM

## 2025-03-14 LAB
CTP QC/QA: YES
POC MOLECULAR INFLUENZA A AGN: NEGATIVE
POC MOLECULAR INFLUENZA B AGN: NEGATIVE
POC RSV RAPID ANT MOLECULAR: NEGATIVE
SARS CORONAVIRUS 2 ANTIGEN: NEGATIVE

## 2025-03-14 RX ORDER — OSELTAMIVIR PHOSPHATE 75 MG/1
75 CAPSULE ORAL 2 TIMES DAILY
Qty: 10 CAPSULE | Refills: 0 | Status: SHIPPED | OUTPATIENT
Start: 2025-03-14 | End: 2025-03-19

## 2025-03-14 NOTE — PROGRESS NOTES
Subjective:      Patient ID: Julee Bee is a 74 y.o. female.    Vitals:  height is 5' (1.524 m) and weight is 72.1 kg (159 lb). Her oral temperature is 100.1 °F (37.8 °C). Her blood pressure is 130/69 and her pulse is 95. Her respiration is 17 and oxygen saturation is 96%.     Chief Complaint: Fever    74 year old patient presents herself with headache, fatigue, fever, body aches, nausea, and weakness for the pat 2 days. Pt stated that she only taken naproxen for body aches but it only gave her mild relief for a short period of time. Pt stated that she is having pain 05/10 but denies any congestion and sore throat      Provider note begins below:  Patient denies any CP, SOB or hemoptysis.  Denies any nausea/vomiting or abdominal pain.  Patient is awake and alert.  Answering all questions appropriately.  Patient does report flu exposure a few weeks ago.    Fever   This is a new problem. The current episode started 2 days ago. The problem has been unchanged. She has not experienced a heat injury.Her temperature was unmeasured prior to arrival. Associated symptoms include coughing, headaches, muscle aches and nausea. Pertinent negatives include no chest pain, congestion, diarrhea, ear pain, rash, sore throat or vomiting.       Constitution: Positive for fatigue and fever. Negative for chills and sweating.   HENT: Negative.  Negative for ear pain, facial swelling, congestion and sore throat.    Neck: Negative for painful lymph nodes.   Cardiovascular: Negative.  Negative for chest trauma, chest pain and sob on exertion.   Eyes: Negative.  Negative for eye itching and eye pain.   Respiratory:  Positive for cough. Negative for chest tightness and asthma.    Gastrointestinal:  Positive for nausea. Negative for vomiting and diarrhea.   Endocrine: negative. cold intolerance and excessive thirst.   Genitourinary: Negative.  Negative for dysuria, frequency, urgency and hematuria.   Musculoskeletal:  Negative for  pain, trauma and joint pain.   Skin: Negative.  Negative for rash, wound and hives.   Allergic/Immunologic: Negative.  Negative for eczema, asthma, hives and itching.   Neurological:  Positive for headaches. Negative for disorientation and altered mental status.   Hematologic/Lymphatic: Negative.  Negative for swollen lymph nodes.   Psychiatric/Behavioral: Negative.  Negative for altered mental status, disorientation and confusion.       Objective:     Physical Exam   Constitutional: She is oriented to person, place, and time. She appears well-developed. She is cooperative.  Non-toxic appearance. She does not appear ill. No distress.   HENT:   Head: Normocephalic and atraumatic.   Ears:   Right Ear: Hearing, tympanic membrane, external ear and ear canal normal. no impacted cerumen  Left Ear: Hearing, tympanic membrane, external ear and ear canal normal. no impacted cerumen  Nose: Congestion present. No mucosal edema, rhinorrhea or nasal deformity. No epistaxis. Right sinus exhibits no maxillary sinus tenderness and no frontal sinus tenderness. Left sinus exhibits no maxillary sinus tenderness and no frontal sinus tenderness.   Mouth/Throat: Uvula is midline, oropharynx is clear and moist and mucous membranes are normal. No trismus in the jaw. Normal dentition. No uvula swelling. No oropharyngeal exudate, posterior oropharyngeal edema or posterior oropharyngeal erythema.   Eyes: Conjunctivae and lids are normal. No scleral icterus.   Neck: Trachea normal and phonation normal. Neck supple. No edema present. No erythema present. No neck rigidity present.   Cardiovascular: Normal rate, regular rhythm, normal heart sounds and normal pulses.   Pulmonary/Chest: Effort normal and breath sounds normal. No stridor. No respiratory distress. She has no decreased breath sounds. She has no wheezes. She has no rhonchi. She has no rales. She exhibits no tenderness.   Abdominal: Normal appearance.   Musculoskeletal: Normal range of  motion.         General: No deformity. Normal range of motion.   Lymphadenopathy:     She has no cervical adenopathy.   Neurological: no focal deficit. She is alert, oriented to person, place, and time and at baseline. She exhibits normal muscle tone. Coordination normal.   Skin: Skin is warm, dry, intact, not diaphoretic and not pale.   Psychiatric: Her speech is normal and behavior is normal. Mood, judgment and thought content normal.   Nursing note and vitals reviewed.    Results for orders placed or performed in visit on 03/14/25   SARS Coronavirus 2 Antigen, POCT Manual Read    Collection Time: 03/14/25  9:33 AM   Result Value Ref Range    SARS Coronavirus 2 Antigen Negative Negative, Presumptive Negative     Acceptable Yes    POCT Influenza A/B MOLECULAR    Collection Time: 03/14/25  9:34 AM   Result Value Ref Range    POC Molecular Influenza A Ag Negative Negative    POC Molecular Influenza B Ag Negative Negative     Acceptable Yes    POCT RSV by Molecular    Collection Time: 03/14/25  9:52 AM   Result Value Ref Range    POC RSV Rapid Ant Molecular Negative Negative     Acceptable Yes          Assessment:     1. Fever, unspecified fever cause    2. Flu-like symptoms    3. Viral URI with cough        Plan:   Patient negative flu/COVID/RSV.  Patient would like to have Tamiflu prescribed based on symptoms and flu exposure.      FOLLOWUP  Follow up if symptoms worsen or fail to improve, for PLEASE CONTACT PCP OR CONTACT THE EMERGENCY ROOM..     PATIENT INSTRUCTIONS  Patient Instructions   INSTRUCTIONS:  - Rest.  - Drink plenty of fluids.  - Take Tylenol and/or Ibuprofen as directed as needed for fever/pain.  Do not take more than the recommended dose.  - follow up with your PCP within the next 1-2 weeks as needed.  - You must understand that you have received an Urgent Care treatment only and that you may be released before all of your medical problems are known or  treated.   - You, the patient, will arrange for follow up care as instructed.   - If your condition worsens or fails to improve we recommend that you receive another evaluation at the ER immediately or contact your PCP to discuss your concerns.   - You can call [phone] or [phone] to help schedule an appointment with the appropriate provider.     -If you smoke cigarettes, it would be beneficial for you to stop.     OVER THE COUNTER RECOMMENDATIONS/SUGGESTIONS.     Make sure to stay well hydrated.     Use Nasal Saline to mechanically move any post nasal drip from your eustachian tube or from the back of your throat.     Use warm salt water gargles to ease your throat pain. Warm salt water gargles as needed for sore throat-  1/2 tsp salt to 1 cup warm water, gargle as desired.     Use an antihistamine such as Claritin, Zyrtec or Allegra to dry you out.      Use pseudoephedrine (behind the counter) to decongest. Pseudoephedrine  30 mg up to 240 mg /day. It can raise your blood pressure and give you palpitations.     Use mucinex (guaifenisin) to break up mucous up to 2400mg/day to loosen any mucous.   The mucinex DM pill has a cough suppressant that can be sedating. It can be used at night to stop the tickle at the back of your throat.  You can use Mucinex D (it has guaifenesin and a high dose of pseudoephedrine) in the mornings to help decongest.        Use Flonase 1-2 sprays/nostril per day. It is a local acting steroid nasal spray, if you develop a bloody nose, stop using the medication immediately.     Sometimes Nyquil at night is beneficial to help you get some rest, however it is sedating and it does have an antihistamine, and tylenol.     Honey is a natural cough suppressant that can be used.     Tylenol up to 4,000 mg a day is safe for short periods and can be used for body aches, pain, and fever. However in high doses and prolonged use it can cause liver irritation.     Ibuprofen is a non-steroidal  anti-inflammatory that can be used for body aches, pain, and fever.However it can also cause stomach irritation if over used.         THANK YOU FOR ALLOWING ME TO PARTICIPATE IN YOUR HEALTHCARE,     Vitaly Ortega, NP     Fever, unspecified fever cause  -     POCT Influenza A/B MOLECULAR  -     SARS Coronavirus 2 Antigen, POCT Manual Read  -     POCT RSV by Molecular    Flu-like symptoms  -     oseltamivir (TAMIFLU) 75 MG capsule; Take 1 capsule (75 mg total) by mouth 2 (two) times daily. for 5 days  Dispense: 10 capsule; Refill: 0    Viral URI with cough

## 2025-03-14 NOTE — PATIENT INSTRUCTIONS
INSTRUCTIONS:  - Rest.  - Drink plenty of fluids.  - Take Tylenol and/or Ibuprofen as directed as needed for fever/pain.  Do not take more than the recommended dose.  - follow up with your PCP within the next 1-2 weeks as needed.  - You must understand that you have received an Urgent Care treatment only and that you may be released before all of your medical problems are known or treated.   - You, the patient, will arrange for follow up care as instructed.   - If your condition worsens or fails to improve we recommend that you receive another evaluation at the ER immediately or contact your PCP to discuss your concerns.   - You can call [phone] or [phone] to help schedule an appointment with the appropriate provider.     -If you smoke cigarettes, it would be beneficial for you to stop.     OVER THE COUNTER RECOMMENDATIONS/SUGGESTIONS.     Make sure to stay well hydrated.     Use Nasal Saline to mechanically move any post nasal drip from your eustachian tube or from the back of your throat.     Use warm salt water gargles to ease your throat pain. Warm salt water gargles as needed for sore throat-  1/2 tsp salt to 1 cup warm water, gargle as desired.     Use an antihistamine such as Claritin, Zyrtec or Allegra to dry you out.      Use pseudoephedrine (behind the counter) to decongest. Pseudoephedrine  30 mg up to 240 mg /day. It can raise your blood pressure and give you palpitations.     Use mucinex (guaifenisin) to break up mucous up to 2400mg/day to loosen any mucous.   The mucinex DM pill has a cough suppressant that can be sedating. It can be used at night to stop the tickle at the back of your throat.  You can use Mucinex D (it has guaifenesin and a high dose of pseudoephedrine) in the mornings to help decongest.        Use Flonase 1-2 sprays/nostril per day. It is a local acting steroid nasal spray, if you develop a bloody nose, stop using the medication immediately.     Sometimes Nyquil at  night is beneficial to help you get some rest, however it is sedating and it does have an antihistamine, and tylenol.     Honey is a natural cough suppressant that can be used.     Tylenol up to 4,000 mg a day is safe for short periods and can be used for body aches, pain, and fever. However in high doses and prolonged use it can cause liver irritation.     Ibuprofen is a non-steroidal anti-inflammatory that can be used for body aches, pain, and fever.However it can also cause stomach irritation if over used.

## 2025-03-26 ENCOUNTER — PATIENT MESSAGE (OUTPATIENT)
Dept: ADMINISTRATIVE | Facility: OTHER | Age: 75
End: 2025-03-26
Payer: MEDICARE

## 2025-04-04 ENCOUNTER — EXTERNAL HOME HEALTH (OUTPATIENT)
Dept: HOME HEALTH SERVICES | Facility: HOSPITAL | Age: 75
End: 2025-04-04
Payer: MEDICARE